# Patient Record
Sex: FEMALE | Race: WHITE | NOT HISPANIC OR LATINO | Employment: FULL TIME | URBAN - METROPOLITAN AREA
[De-identification: names, ages, dates, MRNs, and addresses within clinical notes are randomized per-mention and may not be internally consistent; named-entity substitution may affect disease eponyms.]

---

## 2019-01-15 ENCOUNTER — APPOINTMENT (OUTPATIENT)
Dept: RADIOLOGY | Facility: IMAGING CENTER | Age: 29
End: 2019-01-15
Attending: NURSE PRACTITIONER
Payer: COMMERCIAL

## 2019-01-15 ENCOUNTER — OFFICE VISIT (OUTPATIENT)
Dept: URGENT CARE | Facility: CLINIC | Age: 29
End: 2019-01-15
Payer: COMMERCIAL

## 2019-01-15 VITALS
DIASTOLIC BLOOD PRESSURE: 74 MMHG | TEMPERATURE: 99.4 F | RESPIRATION RATE: 16 BRPM | HEART RATE: 87 BPM | OXYGEN SATURATION: 98 % | SYSTOLIC BLOOD PRESSURE: 118 MMHG

## 2019-01-15 DIAGNOSIS — R05.9 COUGH: ICD-10-CM

## 2019-01-15 PROCEDURE — 71046 X-RAY EXAM CHEST 2 VIEWS: CPT | Mod: 26 | Performed by: NURSE PRACTITIONER

## 2019-01-15 PROCEDURE — 99214 OFFICE O/P EST MOD 30 MIN: CPT | Performed by: NURSE PRACTITIONER

## 2019-01-15 RX ORDER — BENZONATATE 100 MG/1
100 CAPSULE ORAL 3 TIMES DAILY PRN
Qty: 60 CAP | Refills: 0 | Status: SHIPPED | OUTPATIENT
Start: 2019-01-15 | End: 2021-06-23

## 2019-01-15 ASSESSMENT — ENCOUNTER SYMPTOMS
NAUSEA: 0
DIZZINESS: 0
EYE PAIN: 0
VOMITING: 0
MYALGIAS: 0
CHILLS: 0
WHEEZING: 0
SHORTNESS OF BREATH: 0
SORE THROAT: 0
COUGH: 1
RHINORRHEA: 0
FEVER: 0

## 2019-01-15 NOTE — PROGRESS NOTES
Subjective:   Cassie Powers is a 28 y.o. female who presents for Cough (x4-5 months, worse at night and when she's laying down)        Cough   This is a new problem. Episode onset: 4-5 months. The problem has been waxing and waning. The problem occurs every few hours. The cough is non-productive. Pertinent negatives include no chest pain, chills, ear pain, fever, myalgias, nasal congestion, postnasal drip, rash, rhinorrhea, sore throat, shortness of breath or wheezing. The symptoms are aggravated by lying down. She has tried OTC cough suppressant for the symptoms. The treatment provided no relief. There is no history of asthma or bronchitis.     Review of Systems   Constitutional: Negative for chills and fever.   HENT: Negative for ear pain, postnasal drip, rhinorrhea and sore throat.    Eyes: Negative for pain.   Respiratory: Positive for cough. Negative for shortness of breath and wheezing.    Cardiovascular: Negative for chest pain.   Gastrointestinal: Negative for nausea and vomiting.   Genitourinary: Negative for hematuria.   Musculoskeletal: Negative for myalgias.   Skin: Negative for rash.   Neurological: Negative for dizziness.     Allergies   Allergen Reactions   • Latex       Objective:   /74   Pulse 87   Temp 37.4 °C (99.4 °F)   Resp 16   SpO2 98%   Physical Exam   Constitutional: She is oriented to person, place, and time. She appears well-developed and well-nourished. No distress.   HENT:   Head: Normocephalic and atraumatic.   Right Ear: Tympanic membrane normal.   Left Ear: Tympanic membrane normal.   Nose: Nose normal. Right sinus exhibits no maxillary sinus tenderness and no frontal sinus tenderness. Left sinus exhibits no maxillary sinus tenderness and no frontal sinus tenderness.   Mouth/Throat: Uvula is midline, oropharynx is clear and moist and mucous membranes are normal. No posterior oropharyngeal edema, posterior oropharyngeal erythema or tonsillar abscesses. No tonsillar  exudate.   Eyes: Pupils are equal, round, and reactive to light. Conjunctivae and EOM are normal. Right eye exhibits no discharge. Left eye exhibits no discharge.   Cardiovascular: Normal rate and regular rhythm.    No murmur heard.  Pulmonary/Chest: Effort normal and breath sounds normal. No respiratory distress. She has no decreased breath sounds. She has no wheezes. She has no rhonchi. She has no rales.   Abdominal: Soft. She exhibits no distension. There is no tenderness.   Neurological: She is alert and oriented to person, place, and time. She has normal reflexes. No sensory deficit.   Skin: Skin is warm, dry and intact.   Psychiatric: She has a normal mood and affect.         Assessment/Plan:     1. Cough  DX-CHEST-2 VIEWS    benzonatate (TESSALON) 100 MG Cap     X-ray reading without cardiopulmonary findings by my read. Radiology pending.    Xray results  No acute cardiopulmonary process is identified.      Patient is a 28-year-old female presents clinic for complaints of a cough lasting 4-5 months.  X-ray negative.  Discussed differentials with patient.  Patient without fevers, nonproductive, no infectious etiology suspected.  Recommend patient start daily antihistamine.  Will trial Tessalon Perles for cough.  Differential diagnosis, natural history, supportive care, and indications for immediate follow-up discussed.

## 2020-02-14 ENCOUNTER — OFFICE VISIT (OUTPATIENT)
Dept: URGENT CARE | Facility: CLINIC | Age: 30
End: 2020-02-14
Payer: COMMERCIAL

## 2020-02-14 ENCOUNTER — APPOINTMENT (OUTPATIENT)
Dept: RADIOLOGY | Facility: IMAGING CENTER | Age: 30
End: 2020-02-14
Attending: FAMILY MEDICINE
Payer: COMMERCIAL

## 2020-02-14 VITALS
OXYGEN SATURATION: 97 % | TEMPERATURE: 97.9 F | BODY MASS INDEX: 31.14 KG/M2 | RESPIRATION RATE: 14 BRPM | WEIGHT: 198.4 LBS | DIASTOLIC BLOOD PRESSURE: 84 MMHG | HEIGHT: 67 IN | HEART RATE: 96 BPM | SYSTOLIC BLOOD PRESSURE: 126 MMHG

## 2020-02-14 DIAGNOSIS — R05.3 CHRONIC COUGH: ICD-10-CM

## 2020-02-14 PROCEDURE — 99214 OFFICE O/P EST MOD 30 MIN: CPT | Performed by: FAMILY MEDICINE

## 2020-02-14 PROCEDURE — 71046 X-RAY EXAM CHEST 2 VIEWS: CPT | Mod: TC | Performed by: FAMILY MEDICINE

## 2020-02-14 RX ORDER — FLUTICASONE PROPIONATE 220 UG/1
1 AEROSOL, METERED RESPIRATORY (INHALATION) 2 TIMES DAILY
Qty: 1 INHALER | Refills: 0 | Status: SHIPPED | OUTPATIENT
Start: 2020-02-14 | End: 2021-06-23

## 2020-02-14 RX ORDER — BENZONATATE 200 MG/1
200 CAPSULE ORAL 3 TIMES DAILY PRN
Qty: 45 CAP | Refills: 0 | Status: SHIPPED | OUTPATIENT
Start: 2020-02-14 | End: 2021-06-23

## 2020-02-14 RX ORDER — AZITHROMYCIN 250 MG/1
TABLET, FILM COATED ORAL
Qty: 6 TAB | Refills: 0 | Status: SHIPPED | OUTPATIENT
Start: 2020-02-14 | End: 2021-06-23

## 2020-02-14 NOTE — PROGRESS NOTES
CC:  cough        Cough  This is a new problem. The current episode started several months ago. The problem has been unchanged.    She has a dry, constant, hacking cough, worse the more she speaks.   Denies any fever, chest pain or dyspnea.        She denies  : fatigue, muscle aches, fever. Pertinent negatives include no   headaches, nausea, vomiting, diarrhea, sweats, weight loss or wheezing.   She was originally treated for sinusitis with Augmentin 5 weeks ago.   she states that her sinus sx have resolved, just has a persistent dry cough.                  Past medical history was unremarkable and not pertinent to current issue      Social History     Tobacco Use   • Smoking status: Never Smoker   • Smokeless tobacco: Never Used   Substance Use Topics   • Alcohol use: Not on file   • Drug use: Not on file         Current Outpatient Medications on File Prior to Visit   Medication Sig Dispense Refill   • benzonatate (TESSALON) 100 MG Cap Take 1 Cap by mouth 3 times a day as needed for Cough. 60 Cap 0   • hydrocodone-acetaminophen (NORCO) 5-325 MG Tab per tablet Take 1-2 Tabs by mouth every 6 hours as needed. 20 Tab 0   • phenazopyridine (PYRIDIUM) 200 MG Tab Take 1 Tab by mouth 3 times a day as needed. 6 Tab 0   • ondansetron (ZOFRAN) 8 MG Tab Take 1 Tab by mouth every 8 hours as needed for Nausea/Vomiting. 15 Tab 0   • drospirenone-ethinyl estradiol (MICHELLE) 3-0.02 MG per tablet Take 1 Tab by mouth every day.       No current facility-administered medications on file prior to visit.                    Review of Systems   Constitutional: Negative for fever, chills and malaise/fatigue.   Eyes: Negative for vision changes, d/c.    Respiratory: + for cough .  Denies sputum production.    Cardiovascular: Negative for chest pain and palpitations.   Gastrointestinal: Negative for nausea, vomiting, abdominal pain, diarrhea and constipation.   Genitourinary: Negative for dysuria, urgency and frequency.   Skin: Negative for rash  "or  itching.   Neurological: Negative for dizziness and tingling.    Psychiatric/Behavioral: Negative for depression.   Hematologic/lymphatic - denies bruising or excessive bleeding  All other systems reviewed and are negative.           Objective:     /84   Pulse 96   Temp 36.6 °C (97.9 °F) (Temporal)   Resp 14   Ht 1.702 m (5' 7\")   Wt 90 kg (198 lb 6.4 oz)   SpO2 97%     Physical Exam   Constitutional: patient is oriented to person, place, and time. Patient appears well-developed and well-nourished. No distress.   HENT:   Head: Normocephalic and atraumatic.   Right Ear: External ear normal.   Left Ear: External ear normal.   Nose: mucosa normal.  No d/c.  Right sinus exhibits no maxillary sinus tenderness. Left sinus exhibits no maxillary sinus tenderness.   Mouth/Throat: Mucous membranes are normal. No oral lesions.  No posterior pharyngeal erythema.  No oropharyngeal exudate or posterior oropharyngeal edema.   Eyes: Conjunctivae and EOM are normal. Pupils are equal, round, and reactive to light. Right eye exhibits no discharge. Left eye exhibits no discharge. No scleral icterus.   Neck: Normal range of motion. Neck supple. No tracheal deviation present.   Cardiovascular: Normal rate, regular rhythm and normal heart sounds.  Exam reveals no friction rub.    Pulmonary/Chest: Effort normal. No respiratory distress. Patient has no wheezes or rhonchi. Patient has no rales.    Musculoskeletal:  exhibits no edema.   Lymphadenopathy:     Patient has no cervical adenopathy.      Neurological: patient is alert and oriented to person, place, and time.   Skin: Skin is warm and dry. No rash noted. No erythema.   Psychiatric: patient  has a normal mood and affect.  behavior is normal.   Nursing note and vitals reviewed.         Details     Reading Physician Reading Date Result Priority   Guerrero Galaviz M.D.  [phone] [facility]      Narrative & Impression        2/14/2020 1:50 " PM     HISTORY/REASON FOR EXAM:  COUGH; Cough  Chronic cough     TECHNIQUE/EXAM DESCRIPTION AND NUMBER OF VIEWS:  Two views of the chest.     COMPARISON:  1/15/2019.     FINDINGS:     No pulmonary infiltrates or consolidations are noted.  No pleural effusions, no pneumothorax are appreciated.  Normal cardiopericardial silhouette.        IMPRESSION:        1. No active cardiopulmonary abnormalities are identified.             Assessment/Plan:       1. Chronic cough  Chest x-ray was personally interpreted and reviewed. No acute cardiopulmonary findings. No pulmonary infiltrates or densities. Cardiac silhouette is normal. No hemidiaphragm elevation. No bony abnormalities.    Sx suspicious for pertussis.     Pt refused nasal swab  Will tx empirically with azithromycin      - azithromycin (ZITHROMAX) 250 MG Tab; Take as directed  Dispense: 6 Tab; Refill: 0  - fluticasone (FLOVENT HFA) 220 MCG/ACT Aerosol; Inhale 1 Puff by mouth 2 times a day.  Dispense: 1 Inhaler; Refill: 0  - benzonatate (TESSALON) 200 MG capsule; Take 1 Cap by mouth 3 times a day as needed for Cough.  Dispense: 45 Cap; Refill: 0    Follow up in one week if no improvement

## 2021-06-23 ENCOUNTER — APPOINTMENT (OUTPATIENT)
Dept: RADIOLOGY | Facility: MEDICAL CENTER | Age: 31
End: 2021-06-23
Attending: EMERGENCY MEDICINE
Payer: COMMERCIAL

## 2021-06-23 ENCOUNTER — OFFICE VISIT (OUTPATIENT)
Dept: URGENT CARE | Facility: CLINIC | Age: 31
End: 2021-06-23
Payer: COMMERCIAL

## 2021-06-23 ENCOUNTER — HOSPITAL ENCOUNTER (EMERGENCY)
Facility: MEDICAL CENTER | Age: 31
End: 2021-06-23
Attending: EMERGENCY MEDICINE
Payer: COMMERCIAL

## 2021-06-23 ENCOUNTER — PATIENT OUTREACH (OUTPATIENT)
Dept: HEALTH INFORMATION MANAGEMENT | Facility: OTHER | Age: 31
End: 2021-06-23

## 2021-06-23 VITALS
HEIGHT: 67 IN | OXYGEN SATURATION: 98 % | BODY MASS INDEX: 29.03 KG/M2 | WEIGHT: 185 LBS | HEART RATE: 83 BPM | SYSTOLIC BLOOD PRESSURE: 104 MMHG | TEMPERATURE: 97.3 F | RESPIRATION RATE: 16 BRPM | DIASTOLIC BLOOD PRESSURE: 66 MMHG

## 2021-06-23 VITALS
DIASTOLIC BLOOD PRESSURE: 78 MMHG | HEART RATE: 86 BPM | RESPIRATION RATE: 18 BRPM | TEMPERATURE: 98.5 F | SYSTOLIC BLOOD PRESSURE: 128 MMHG | OXYGEN SATURATION: 97 % | BODY MASS INDEX: 32.94 KG/M2 | WEIGHT: 209.88 LBS | HEIGHT: 67 IN

## 2021-06-23 DIAGNOSIS — R10.31 RLQ ABDOMINAL PAIN: ICD-10-CM

## 2021-06-23 DIAGNOSIS — I82.890 DEEP VENOUS THROMBOSIS OF PELVIC VEIN: ICD-10-CM

## 2021-06-23 LAB
ALBUMIN SERPL BCP-MCNC: 4.4 G/DL (ref 3.2–4.9)
ALBUMIN/GLOB SERPL: 1.3 G/DL
ALP SERPL-CCNC: 124 U/L (ref 30–99)
ALT SERPL-CCNC: 10 U/L (ref 2–50)
ANION GAP SERPL CALC-SCNC: 12 MMOL/L (ref 7–16)
APPEARANCE UR: CLEAR
AST SERPL-CCNC: 10 U/L (ref 12–45)
BASOPHILS # BLD AUTO: 0.4 % (ref 0–1.8)
BASOPHILS # BLD: 0.04 K/UL (ref 0–0.12)
BILIRUB SERPL-MCNC: 0.4 MG/DL (ref 0.1–1.5)
BILIRUB UR STRIP-MCNC: NORMAL MG/DL
BUN SERPL-MCNC: 11 MG/DL (ref 8–22)
CALCIUM SERPL-MCNC: 9.2 MG/DL (ref 8.4–10.2)
CHLORIDE SERPL-SCNC: 102 MMOL/L (ref 96–112)
CO2 SERPL-SCNC: 21 MMOL/L (ref 20–33)
COLOR UR AUTO: YELLOW
CREAT SERPL-MCNC: 0.71 MG/DL (ref 0.5–1.4)
EOSINOPHIL # BLD AUTO: 0.06 K/UL (ref 0–0.51)
EOSINOPHIL NFR BLD: 0.5 % (ref 0–6.9)
ERYTHROCYTE [DISTWIDTH] IN BLOOD BY AUTOMATED COUNT: 39.2 FL (ref 35.9–50)
GLOBULIN SER CALC-MCNC: 3.3 G/DL (ref 1.9–3.5)
GLUCOSE SERPL-MCNC: 80 MG/DL (ref 65–99)
GLUCOSE UR STRIP.AUTO-MCNC: NORMAL MG/DL
HCT VFR BLD AUTO: 40.8 % (ref 37–47)
HGB BLD-MCNC: 13.8 G/DL (ref 12–16)
IMM GRANULOCYTES # BLD AUTO: 0.05 K/UL (ref 0–0.11)
IMM GRANULOCYTES NFR BLD AUTO: 0.5 % (ref 0–0.9)
INT CON NEG: NORMAL
INT CON POS: NORMAL
KETONES UR STRIP.AUTO-MCNC: NORMAL MG/DL
LEUKOCYTE ESTERASE UR QL STRIP.AUTO: NORMAL
LIPASE SERPL-CCNC: 34 U/L (ref 7–58)
LYMPHOCYTES # BLD AUTO: 1.92 K/UL (ref 1–4.8)
LYMPHOCYTES NFR BLD: 17.3 % (ref 22–41)
MCH RBC QN AUTO: 29.1 PG (ref 27–33)
MCHC RBC AUTO-ENTMCNC: 33.8 G/DL (ref 33.6–35)
MCV RBC AUTO: 86.1 FL (ref 81.4–97.8)
MONOCYTES # BLD AUTO: 0.94 K/UL (ref 0–0.85)
MONOCYTES NFR BLD AUTO: 8.5 % (ref 0–13.4)
NEUTROPHILS # BLD AUTO: 8.1 K/UL (ref 2–7.15)
NEUTROPHILS NFR BLD: 72.8 % (ref 44–72)
NITRITE UR QL STRIP.AUTO: NORMAL
NRBC # BLD AUTO: 0 K/UL
NRBC BLD-RTO: 0 /100 WBC
PH UR STRIP.AUTO: 5.5 [PH] (ref 5–8)
PLATELET # BLD AUTO: 362 K/UL (ref 164–446)
PMV BLD AUTO: 8.5 FL (ref 9–12.9)
POC URINE PREGNANCY TEST: NEGATIVE
POTASSIUM SERPL-SCNC: 4 MMOL/L (ref 3.6–5.5)
PROT SERPL-MCNC: 7.7 G/DL (ref 6–8.2)
PROT UR QL STRIP: NORMAL MG/DL
RBC # BLD AUTO: 4.74 M/UL (ref 4.2–5.4)
RBC UR QL AUTO: NORMAL
SODIUM SERPL-SCNC: 135 MMOL/L (ref 135–145)
SP GR UR STRIP.AUTO: 1.03
UROBILINOGEN UR STRIP-MCNC: 0.2 MG/DL
WBC # BLD AUTO: 11.1 K/UL (ref 4.8–10.8)

## 2021-06-23 PROCEDURE — 83690 ASSAY OF LIPASE: CPT

## 2021-06-23 PROCEDURE — 96372 THER/PROPH/DIAG INJ SC/IM: CPT

## 2021-06-23 PROCEDURE — 99284 EMERGENCY DEPT VISIT MOD MDM: CPT

## 2021-06-23 PROCEDURE — 81025 URINE PREGNANCY TEST: CPT | Performed by: NURSE PRACTITIONER

## 2021-06-23 PROCEDURE — 85025 COMPLETE CBC W/AUTO DIFF WBC: CPT

## 2021-06-23 PROCEDURE — 80053 COMPREHEN METABOLIC PANEL: CPT

## 2021-06-23 PROCEDURE — 700117 HCHG RX CONTRAST REV CODE 255: Performed by: EMERGENCY MEDICINE

## 2021-06-23 PROCEDURE — U0003 INFECTIOUS AGENT DETECTION BY NUCLEIC ACID (DNA OR RNA); SEVERE ACUTE RESPIRATORY SYNDROME CORONAVIRUS 2 (SARS-COV-2) (CORONAVIRUS DISEASE [COVID-19]), AMPLIFIED PROBE TECHNIQUE, MAKING USE OF HIGH THROUGHPUT TECHNOLOGIES AS DESCRIBED BY CMS-2020-01-R: HCPCS

## 2021-06-23 PROCEDURE — 81002 URINALYSIS NONAUTO W/O SCOPE: CPT | Performed by: NURSE PRACTITIONER

## 2021-06-23 PROCEDURE — 74177 CT ABD & PELVIS W/CONTRAST: CPT

## 2021-06-23 PROCEDURE — 700111 HCHG RX REV CODE 636 W/ 250 OVERRIDE (IP): Performed by: EMERGENCY MEDICINE

## 2021-06-23 PROCEDURE — U0005 INFEC AGEN DETEC AMPLI PROBE: HCPCS

## 2021-06-23 PROCEDURE — 99214 OFFICE O/P EST MOD 30 MIN: CPT | Performed by: NURSE PRACTITIONER

## 2021-06-23 PROCEDURE — 76856 US EXAM PELVIC COMPLETE: CPT

## 2021-06-23 RX ORDER — DROSPIRENONE AND ETHINYL ESTRADIOL 0.03MG-3MG
1 KIT ORAL DAILY
COMMUNITY
End: 2021-06-23

## 2021-06-23 RX ORDER — IBUPROFEN 200 MG
400 TABLET ORAL EVERY 6 HOURS PRN
Status: SHIPPED | COMMUNITY
End: 2021-06-29

## 2021-06-23 RX ORDER — ACETAMINOPHEN 500 MG
1000 TABLET ORAL EVERY 6 HOURS PRN
COMMUNITY

## 2021-06-23 RX ORDER — NAPROXEN 500 MG/1
500 TABLET ORAL 2 TIMES DAILY WITH MEALS
Qty: 60 TABLET | Refills: 0 | Status: SHIPPED | OUTPATIENT
Start: 2021-06-23 | End: 2021-06-29

## 2021-06-23 RX ORDER — APIXABAN 5 MG (74)
10 KIT ORAL 2 TIMES DAILY
Qty: 60 EACH | Refills: 0 | Status: SHIPPED | OUTPATIENT
Start: 2021-06-23 | End: 2021-06-29

## 2021-06-23 RX ADMIN — ENOXAPARIN SODIUM 100 MG: 100 INJECTION SUBCUTANEOUS at 17:05

## 2021-06-23 RX ADMIN — IOHEXOL 100 ML: 350 INJECTION, SOLUTION INTRAVENOUS at 16:17

## 2021-06-23 ASSESSMENT — ENCOUNTER SYMPTOMS
WHEEZING: 0
DIZZINESS: 0
FLATUS: 0
CHILLS: 0
FEVER: 0
VOMITING: 0
ORTHOPNEA: 0
NAUSEA: 0
ABDOMINAL PAIN: 1
HEARTBURN: 0
DIARRHEA: 0
ANOREXIA: 0
COUGH: 0
MYALGIAS: 0
TINGLING: 0
BELCHING: 0
HEMATOCHEZIA: 0
BACK PAIN: 0
ARTHRALGIAS: 0
PALPITATIONS: 0
MEMORY LOSS: 0
SORE THROAT: 0
CONSTIPATION: 0
SHORTNESS OF BREATH: 0
HEADACHES: 0

## 2021-06-23 NOTE — ED PROVIDER NOTES
ED Provider Note    CHIEF COMPLAINT  Chief Complaint   Patient presents with   • Abdominal Pain   • Headache       HPI  Cassie Powers is a 31 y.o. female who presents complaining of right lower abdominal pain that she has had for the last 3 days.  She describes it as a sharp pain that first was intermittent and now is constant.  She states that this morning the pain was at its worst.  Nothing seems to make it better or worse.  She denies any dysuria frequency or urgency.  She denies pregnancy as she went to the urgent care prior to coming here and they tested her urine and was negative for pregnancy.  She denies any abnormal vaginal discharge or bleeding.  She denies any documented fevers however she has felt fevers and chills.  She has no appetite.  She still has her gallbladder and appendix.  The patient drinks occasionally.  She denies any drug use or tobacco use.  Currently her pain is about a 4/10.    REVIEW OF SYSTEMS  HEENT:  No ear pain, congestion or sore throat   EYES: no discharge redness or vision changes  CARDIAC: no chest pain, palpitations    PULMONARY: no dyspnea, cough or congestion   GI: no vomiting diarrhea positive abdominal pain  : no dysuria, back pain or hematuria   Neuro: no weakness, numbness aphasia or headache  Musculoskeletal: no swelling deformity or pain no joint swelling  Endocrine: no fevers, sweating, weight loss   SKIN: no rash, erythema or contusions     See history of present illness all other systems are negative      PAST MEDICAL HISTORY  Past Medical History:   Diagnosis Date   • Anxiety    • Scoliosis        FAMILY HISTORY  History reviewed. No pertinent family history.    SOCIAL HISTORY  Social History     Socioeconomic History   • Marital status: Single     Spouse name: Not on file   • Number of children: Not on file   • Years of education: Not on file   • Highest education level: Not on file   Occupational History   • Not on file   Tobacco Use   • Smoking status: Never  "Smoker   • Smokeless tobacco: Never Used   Vaping Use   • Vaping Use: Never used   Substance and Sexual Activity   • Alcohol use: Yes     Comment: occ    • Drug use: Never   • Sexual activity: Not on file   Other Topics Concern   • Not on file   Social History Narrative   • Not on file     Social Determinants of Health     Financial Resource Strain:    • Difficulty of Paying Living Expenses:    Food Insecurity:    • Worried About Running Out of Food in the Last Year:    • Ran Out of Food in the Last Year:    Transportation Needs:    • Lack of Transportation (Medical):    • Lack of Transportation (Non-Medical):    Physical Activity:    • Days of Exercise per Week:    • Minutes of Exercise per Session:    Stress:    • Feeling of Stress :    Social Connections:    • Frequency of Communication with Friends and Family:    • Frequency of Social Gatherings with Friends and Family:    • Attends [employer] Services:    • Active Member of Clubs or Organizations:    • Attends Club or Organization Meetings:    • Marital Status:    Intimate Partner Violence:    • Fear of Current or Ex-Partner:    • Emotionally Abused:    • Physically Abused:    • Sexually Abused:        SURGICAL HISTORY  No past surgical history on file.    CURRENT MEDICATIONS  Home Medications     Reviewed by Kwais Torres (Pharmacy Tech) on 06/23/21 at 1714  Med List Status: Complete   Medication Last Dose Status   acetaminophen (TYLENOL) 500 MG Tab 6/22/2021 Active   ibuprofen (MOTRIN) 200 MG Tab 6/23/2021 Active   Levonorgestrel (MIRENA, 52 MG, IU) > 3 years Active                 ALLERGIES  Allergies   Allergen Reactions   • Latex Hives and Itching       PHYSICAL EXAM  VITAL SIGNS: /78   Pulse 86   Temp 36.9 °C (98.5 °F) (Temporal)   Resp 18   Ht 1.702 m (5' 7\")   Wt 95.2 kg (209 lb 14.1 oz)   LMP 06/17/2021 (Approximate)   SpO2 97%   BMI 32.87 kg/m²       Constitutional: Well developed, Well nourished, No acute distress, Non-toxic " appearance.   HENT: Normocephalic, Atraumatic, Bilateral external ears normal, Oropharynx moist, No oral exudates, Nose normal.   Eyes: PERRLA, EOMI, Conjunctiva normal, No discharge.   Neck: Normal range of motion, No tenderness, Supple, No stridor.   Lymphatic: No lymphadenopathy noted.   Cardiovascular: Normal heart rate, Normal rhythm, No murmurs, No rubs, No gallops.   Thorax & Lungs: Normal breath sounds, No respiratory distress, No wheezing, No chest tenderness.   Abdomen: Positive right lower quadrant tenderness to palpation with mild rebound bowel sounds are decreased abdomen is nondistended  Skin: Warm, Dry, No erythema, No rash.   Back: No tenderness, No CVA tenderness.   Extremities: Intact distal pulses, No edema, No tenderness, No cyanosis, No clubbing.   Neurologic: Alert & oriented x 3, Normal motor function, Normal sensory function, No focal deficits noted.       RADIOLOGY/PROCEDURES  US-PELVIC COMPLETE (TRANSABDOMINAL/TRANSVAGINAL) (COMBO)   Final Result      1.  Complex right adnexal tubular structure of uncertain etiology, possibly dilated fallopian tube with irregular contents versus partially thrombosed vessels/vein.   2.  Right ovary contains multiple small follicles. Blood flow could not be obtained within the right ovary which is indeterminate and possibly technical versus ovarian torsion. The ovaries not particularly enlarged.   3.  The left ovary is not visualized.   4.  Gynecology consultation is suggested.      CT-ABDOMEN-PELVIS WITH   Final Result         1.  Right ovarian vein thrombophlebitis and thrombosis of right internal iliac vein. There are additional prominent dilated tubular structures in the pelvis on the right which probably represent dilated thrombosed veins with adjacent fat stranding    possibly related to edema. An underlying inflammatory process in the right adnexa cannot be excluded. Consider further characterization with pelvic ultrasound. Gynecology consultation is  suggested.   2.  Mild right-sided urinary bladder wall thickening which could be reactive however correlate with urinalysis.   3.  IUD is well positioned.   4.  Normal appendix.         These findings were discussed with ADAM BETANCOURT on 6/23/2021 4:38 PM.               COURSE & MEDICAL DECISION MAKING  Pertinent Labs & Imaging studies reviewed. (See chart for details)  Differential diagnosis: Appendicitis, colitis, ovarian cyst,    Results for orders placed or performed during the hospital encounter of 06/23/21   CBC WITH DIFFERENTIAL   Result Value Ref Range    WBC 11.1 (H) 4.8 - 10.8 K/uL    RBC 4.74 4.20 - 5.40 M/uL    Hemoglobin 13.8 12.0 - 16.0 g/dL    Hematocrit 40.8 37.0 - 47.0 %    MCV 86.1 81.4 - 97.8 fL    MCH 29.1 27.0 - 33.0 pg    MCHC 33.8 33.6 - 35.0 g/dL    RDW 39.2 35.9 - 50.0 fL    Platelet Count 362 164 - 446 K/uL    MPV 8.5 (L) 9.0 - 12.9 fL    Neutrophils-Polys 72.80 (H) 44.00 - 72.00 %    Lymphocytes 17.30 (L) 22.00 - 41.00 %    Monocytes 8.50 0.00 - 13.40 %    Eosinophils 0.50 0.00 - 6.90 %    Basophils 0.40 0.00 - 1.80 %    Immature Granulocytes 0.50 0.00 - 0.90 %    Nucleated RBC 0.00 /100 WBC    Neutrophils (Absolute) 8.10 (H) 2.00 - 7.15 K/uL    Lymphs (Absolute) 1.92 1.00 - 4.80 K/uL    Monos (Absolute) 0.94 (H) 0.00 - 0.85 K/uL    Eos (Absolute) 0.06 0.00 - 0.51 K/uL    Baso (Absolute) 0.04 0.00 - 0.12 K/uL    Immature Granulocytes (abs) 0.05 0.00 - 0.11 K/uL    NRBC (Absolute) 0.00 K/uL   COMP METABOLIC PANEL   Result Value Ref Range    Sodium 135 135 - 145 mmol/L    Potassium 4.0 3.6 - 5.5 mmol/L    Chloride 102 96 - 112 mmol/L    Co2 21 20 - 33 mmol/L    Anion Gap 12.0 7.0 - 16.0    Glucose 80 65 - 99 mg/dL    Bun 11 8 - 22 mg/dL    Creatinine 0.71 0.50 - 1.40 mg/dL    Calcium 9.2 8.4 - 10.2 mg/dL    AST(SGOT) 10 (L) 12 - 45 U/L    ALT(SGPT) 10 2 - 50 U/L    Alkaline Phosphatase 124 (H) 30 - 99 U/L    Total Bilirubin 0.4 0.1 - 1.5 mg/dL    Albumin 4.4 3.2 - 4.9 g/dL    Total Protein  7.7 6.0 - 8.2 g/dL    Globulin 3.3 1.9 - 3.5 g/dL    A-G Ratio 1.3 g/dL   LIPASE   Result Value Ref Range    Lipase 34 7 - 58 U/L   ESTIMATED GFR   Result Value Ref Range    GFR If African American >60 >60 mL/min/1.73 m 2    GFR If Non African American >60 >60 mL/min/1.73 m 2   SARS-CoV-2 PCR (24 hour In-House): Collect NP swab in VTM    Specimen: Nasopharyngeal; Respirate   Result Value Ref Range    SARS-CoV-2 Source NP Swab         5:22 PM spoke with Dr. Raphael who is on-call for GYN at [facility].  She states that there is nothing to do from a gynecological standpoint at this time.  The patient will be admitted here at [facility] for pain control, blood thinners and further evaluation.    6:21 PM spoke with vascular surgery Dr. Barrientos vascular surgery to just recommends anticoagulation and an outpatient hypercoagulable work-up with her primary care physician.    I spoke with the patient and her pain is under control and she wishes to be discharged home.  I did give her a dose of Lovenox here in the emergency department and I will prescribe her an Eliquis starter pack which she is to start in the morning first thing.  I also prescribe her Naprosyn for pain.  If she has worsening pain dizziness chest pain or shortness of breath she should return to the emergency department immediately.  The patient understands her diagnosis.  I also spoke with ED scheduling so they can schedule her with a primary care appointment.  Patient is to return for any worsening symptoms.    The patient will return for new or worsening symptoms and is stable at the time of discharge.    The patient is referred to a primary physician for blood pressure management, diabetic screening, and for all other preventative health concerns.      DISPOSITION:  Patient will be discharged home in stable condition.    FOLLOW UP:  No follow-up provider specified.    OUTPATIENT MEDICATIONS:  Discharge Medication List as of 6/23/2021  6:15 PM      START  taking these medications    Details   Apixaban Starter Pack (ELIQUIS DVT/PE STARTER PACK) 5 MG Tablet Therapy Pack Take 10 mg by mouth 2 times a day for 7 days. Then 5 mg BID, Disp-60 Each, R-0, Normal      naproxen (NAPROSYN) 500 MG Tab Take 1 tablet by mouth 2 times a day with meals., Disp-60 tablet, R-0, Normal               FINAL IMPRESSION  1. Deep venous thrombosis of pelvic vein  REFERRAL TO ANTICOAGULATION MONITORING    extending into IVC           Electronically signed by: Rukhsana Mortensen M.D., 6/23/2021 3:27 PM

## 2021-06-23 NOTE — ED TRIAGE NOTES
"Pt seen at [facility] earlier today.    Pt has been having RLQ pain that is \"stabbing\" and so painful that it stopped her in her tracks when walking. Pt states the pain is always there a little bit. Pt reports pain is worse with movement but can be improved with some positions.     Pt reports increasing s/s for past 10 days; has been treating with Advil and tylenol.     Pt provided urine sample at Urgent care.   "

## 2021-06-23 NOTE — PROGRESS NOTES
Subjective:      Cassie Powers is a 31 y.o. female who presents with RLQ pain x 1 week, more on the right side sharp pain )        Is a pleasant 31-year-old female who presents with 1 week of right lower quadrant pain.  Patient states she has had a sharp right pain of sudden onset while she was walking approximately 1 week ago.  She states she has been able to have bowel movements all week until today when she was having significant amount of pain and was unable to have a bowel movement.  She does work for [employer] [place] and did do a bedside ultrasound consult was told that her right ovary was unable to be visualized and she should get her appendix looked at.  Patient presented to urgent care.  States yesterday she was having sweats which is very abnormal for her.      RLQ Pain  This is a new problem. The current episode started in the past 7 days. The onset quality is sudden. The problem occurs constantly. The problem has been unchanged. The pain is located in the RLQ. The quality of the pain is aching and sharp. The abdominal pain does not radiate. Pertinent negatives include no anorexia, arthralgias, belching, constipation, diarrhea, dysuria, fever, flatus, headaches, hematochezia, hematuria, myalgias, nausea or vomiting. The pain is aggravated by certain positions and bowel movement. The pain is relieved by nothing. She has tried nothing for the symptoms.       Review of Systems   Constitutional: Negative for chills, fever and malaise/fatigue.   HENT: Negative for ear pain and sore throat.    Respiratory: Negative for cough, shortness of breath and wheezing.    Cardiovascular: Negative for chest pain, palpitations, orthopnea and leg swelling.   Gastrointestinal: Positive for abdominal pain. Negative for anorexia, constipation, diarrhea, flatus, heartburn, hematochezia, nausea and vomiting.   Genitourinary: Negative for dysuria and hematuria.   Musculoskeletal: Negative for arthralgias, back pain, joint pain  "and myalgias.   Skin: Negative for rash.   Neurological: Negative for dizziness, tingling and headaches.   Psychiatric/Behavioral: Negative for memory loss and suicidal ideas.   All other systems reviewed and are negative.         Objective:     /66   Pulse 83   Temp 36.3 °C (97.3 °F) (Temporal)   Resp 16   Ht 1.702 m (5' 7\")   Wt 83.9 kg (185 lb)   LMP 06/17/2021 (Approximate)   SpO2 98%   Breastfeeding No   BMI 28.98 kg/m²      Physical Exam  Vitals reviewed.   Constitutional:       General: She is not in acute distress.     Appearance: Normal appearance. She is not ill-appearing.   HENT:      Head: Normocephalic.      Right Ear: External ear normal.      Left Ear: External ear normal.      Nose: Nose normal. No congestion.      Mouth/Throat:      Mouth: Mucous membranes are moist.   Eyes:      Extraocular Movements: Extraocular movements intact.      Conjunctiva/sclera: Conjunctivae normal.   Cardiovascular:      Rate and Rhythm: Normal rate and regular rhythm.      Pulses: Normal pulses.      Heart sounds: Normal heart sounds. No murmur heard.     Pulmonary:      Effort: Pulmonary effort is normal.      Breath sounds: Normal breath sounds. No wheezing.   Abdominal:      General: Abdomen is flat. Bowel sounds are normal.      Palpations: Abdomen is soft. There is no mass.      Tenderness: There is abdominal tenderness in the right lower quadrant. There is rebound. There is no right CVA tenderness, left CVA tenderness or guarding. Positive signs include McBurney's sign. Negative signs include Moeller's sign.   Musculoskeletal:         General: Normal range of motion.      Cervical back: Normal range of motion.   Lymphadenopathy:      Cervical: No cervical adenopathy.   Skin:     General: Skin is warm and dry.      Capillary Refill: Capillary refill takes less than 2 seconds.   Neurological:      Mental Status: She is alert and oriented to person, place, and time.   Psychiatric:         Mood and " Affect: Mood normal.         Behavior: Behavior normal.            Lab Results/POC Test Results   Results for orders placed or performed in visit on 06/23/21   POCT Urinalysis   Result Value Ref Range    POC Color Yellow Negative    POC Appearance Clear Negative    POC Leukocyte Esterase Neg Negative    POC Nitrites Neg Negative    POC Urobiligen 0.2 Negative (0.2) mg/dL    POC Protein trace Negative mg/dL    POC Urine PH 5.5 5.0 - 8.0    POC Blood Trace-intact Negative    POC Specific Gravity 1.030 <1.005 - >1.030    POC Ketones Neg Negative mg/dL    POC Bilirubin Neg Negative mg/dL    POC Glucose Neg Negative mg/dL   POCT PREGNANCY   Result Value Ref Range    POC Urine Pregnancy Test Negative Negative    Internal Control Positive Valid     Internal Control Negative Valid                      Assessment/Plan:        1. RLQ abdominal pain  POCT Urinalysis    POCT PREGNANCY     At this time, I feel the patient requires a higher level of care including closer monitoring, stat lab work and/or imaging for further evaluation for complaints of RLQ pain.  This has been discussed with the patient and they state agreement and understanding.. The patient is in no acute distress upon clinic departure and will go directly to ED without delay.     My total time spent caring for the patient on the day of the encounter was at least 30 minutes.  Time includes record review, exam, communication with the patient, and documentation of this encounter. This does not include time spent on separately billable procedures/tests.    Please note that this dictation was created using voice recognition software. I have made every reasonable attempt to correct obvious errors, but I expect that there are errors of grammar and possibly content that I did not discover before finalizing the note.

## 2021-06-24 ENCOUNTER — ANTICOAGULATION MONITORING (OUTPATIENT)
Dept: VASCULAR LAB | Facility: MEDICAL CENTER | Age: 31
End: 2021-06-24

## 2021-06-24 LAB
SARS-COV-2 RNA RESP QL NAA+PROBE: NOTDETECTED
SPECIMEN SOURCE: NORMAL

## 2021-06-24 NOTE — PROGRESS NOTES
CHW called patient and scheduled a PCP appt on 06/29/21 at 2:30pm with Anita Domínguez. Patient informed over phone and via text of appointment. Patient has no other needs. Patient has contact info for CCM. CHW will not follow.

## 2021-06-24 NOTE — PROGRESS NOTES
ACUTE CARE VASCULAR SERVICE  PROGRESS NOTE      Notified of RLQ pain and right ovarian vein thrombus tracking to IVC    Recommend anticoagulation and outpatient follow-up for eventual hypercoagulable workup  If xarelto or eliquis is used for anticoagulation then patient could be discharged today provided pain is adequately controlled    Fantasma Barrientos MD  [facility] (General and Vascular Surgery)  Cell: [phone] (text/call)  Office: [phone]  __________________________________________________________________  Patient:Cassie Powers   MRN:[id number]   CSN:[id number]    6/23/2021    6:01 PM

## 2021-06-24 NOTE — PROGRESS NOTES
[facility]    Received anticoag referral from Dr. Rukhsana Mortensen on 6/23/21    Scheduled pt for initial visit to establish care on 6/29/21 @ 1:15 pm    Insurance:   PCP: [facility] (establishing care on 6/29 at )  Locations to be seen: [address]    [facility] at [phone], fax [phone]    Marilee SalamancaD

## 2021-06-24 NOTE — ED NOTES
Med rec updated and complete  Allergies reviewed  Interviewed pt with mother at bedside with permission from pt.  Pt reports no vitamin   Pt reports no antibiotics in the last 2 weeks       No current facility-administered medications on file prior to encounter.     Current Outpatient Medications on File Prior to Encounter   Medication Sig Dispense Refill   • acetaminophen (TYLENOL) 500 MG Tab Take 1,000 mg by mouth every 6 hours as needed for Moderate Pain.     • ibuprofen (MOTRIN) 200 MG Tab Take 400 mg by mouth every 6 hours as needed for Mild Pain.     • Levonorgestrel (MIRENA, 52 MG, IU) 1 Each by Intrauterine route see administration instructions. Every 5 years

## 2021-06-29 ENCOUNTER — OFFICE VISIT (OUTPATIENT)
Dept: MEDICAL GROUP | Facility: MEDICAL CENTER | Age: 31
End: 2021-06-29
Payer: COMMERCIAL

## 2021-06-29 ENCOUNTER — ANTICOAGULATION VISIT (OUTPATIENT)
Dept: VASCULAR LAB | Facility: MEDICAL CENTER | Age: 31
End: 2021-06-29
Attending: INTERNAL MEDICINE
Payer: COMMERCIAL

## 2021-06-29 ENCOUNTER — SUPERVISING PHYSICIAN REVIEW (OUTPATIENT)
Dept: VASCULAR LAB | Facility: MEDICAL CENTER | Age: 31
End: 2021-06-29

## 2021-06-29 VITALS
DIASTOLIC BLOOD PRESSURE: 76 MMHG | WEIGHT: 210.4 LBS | OXYGEN SATURATION: 96 % | BODY MASS INDEX: 33.02 KG/M2 | SYSTOLIC BLOOD PRESSURE: 120 MMHG | HEIGHT: 67 IN | TEMPERATURE: 98.1 F | HEART RATE: 84 BPM

## 2021-06-29 DIAGNOSIS — N83.8 RIGHT TUBO-OVARIAN MASS: ICD-10-CM

## 2021-06-29 DIAGNOSIS — I82.890 ACUTE DEEP VEIN THROMBOSIS OF PELVIC VEIN: ICD-10-CM

## 2021-06-29 DIAGNOSIS — Z91.89 AT RISK FOR INADEQUATE PAIN CONTROL: ICD-10-CM

## 2021-06-29 DIAGNOSIS — I82.90 ACUTE DEEP VEIN THROMBOSIS (DVT) OF NON-EXTREMITY VEIN: ICD-10-CM

## 2021-06-29 PROBLEM — I82.409 DEEP VEIN THROMBOSIS (HCC): Status: ACTIVE | Noted: 2021-06-29

## 2021-06-29 LAB
APPEARANCE UR: CLEAR
BILIRUB UR STRIP-MCNC: NORMAL MG/DL
COLOR UR AUTO: YELLOW
GLUCOSE UR STRIP.AUTO-MCNC: NORMAL MG/DL
KETONES UR STRIP.AUTO-MCNC: 0.2 MG/DL
LEUKOCYTE ESTERASE UR QL STRIP.AUTO: NORMAL
NITRITE UR QL STRIP.AUTO: NORMAL
PH UR STRIP.AUTO: NORMAL [PH] (ref 5–8)
PROT UR QL STRIP: 1.01 MG/DL
RBC UR QL AUTO: 5.5
SP GR UR STRIP.AUTO: NORMAL
UROBILINOGEN UR STRIP-MCNC: NORMAL MG/DL

## 2021-06-29 PROCEDURE — 81002 URINALYSIS NONAUTO W/O SCOPE: CPT | Performed by: PHYSICIAN ASSISTANT

## 2021-06-29 PROCEDURE — 99214 OFFICE O/P EST MOD 30 MIN: CPT | Performed by: PHYSICIAN ASSISTANT

## 2021-06-29 PROCEDURE — 99213 OFFICE O/P EST LOW 20 MIN: CPT

## 2021-06-29 PROCEDURE — 87491 CHLMYD TRACH DNA AMP PROBE: CPT

## 2021-06-29 PROCEDURE — 87591 N.GONORRHOEAE DNA AMP PROB: CPT

## 2021-06-29 ASSESSMENT — FIBROSIS 4 INDEX: FIB4 SCORE: 0.25

## 2021-06-29 ASSESSMENT — PATIENT HEALTH QUESTIONNAIRE - PHQ9: CLINICAL INTERPRETATION OF PHQ2 SCORE: 0

## 2021-06-29 NOTE — PROGRESS NOTES
NEW DOAC   .  Anticoagulation Summary  As of 6/29/2021    INR goal:     TTR:  --   INR used for dosing:  No new INR was available at the time of this encounter.   Warfarin maintenance plan:  No maintenance plan   Next INR check:  7/29/2021   Target end date:  9/29/2021    Indications    Acute deep vein thrombosis of pelvic vein [I82.890]             Anticoagulation Episode Summary     INR check location:      Preferred lab:      Send INR reminders to:      Comments:  Eliquis      Anticoagulation Care Providers     Provider Role Specialty Phone number    [facility] Responsible  [phone]        Anticoagulation Patient Findings      PCP: Anita Domínguez P.A.-C.  Cardiologist: none  Dx: Acute DVT of pelvic vein  CHADSVASC = n/a  HAS-BLED = 0  Target End Date = 9/29/21    Pt Hx: Pt was experiencing abdominal pain about 1.5 weeks ago and presented to the ER on 6/23. She was found to have DVT of the iliac vein.    Imaging was done during this ER visit:     US of the pelvis: Complex right adnexal tubular structure of uncertain etiology, possibly dilated fallopian tube with irregular contents versus partially thrombosed vessels/vein.    CT of the abdomen & pelvis: Right ovarian vein thrombophlebitis and thrombosis of right internal iliac vein. There are additional prominent dilated tubular structures in the pelvis on the right which probably represent dilated thrombosed veins with adjacent fat stranding     Labs:  Lab Results   Component Value Date/Time    WBC 11.1 (H) 06/23/2021 03:23 PM    RBC 4.74 06/23/2021 03:23 PM    HEMOGLOBIN 13.8 06/23/2021 03:23 PM    HEMATOCRIT 40.8 06/23/2021 03:23 PM    MCV 86.1 06/23/2021 03:23 PM    MCH 29.1 06/23/2021 03:23 PM    MCHC 33.8 06/23/2021 03:23 PM    MPV 8.5 (L) 06/23/2021 03:23 PM    NEUTSPOLYS 72.80 (H) 06/23/2021 03:23 PM    LYMPHOCYTES 17.30 (L) 06/23/2021 03:23 PM    MONOCYTES 8.50 06/23/2021 03:23 PM    EOSINOPHILS 0.50 06/23/2021 03:23 PM     BASOPHILS 0.40 06/23/2021 03:23 PM      Lab Results   Component Value Date/Time    SODIUM 135 06/23/2021 03:23 PM    POTASSIUM 4.0 06/23/2021 03:23 PM    CHLORIDE 102 06/23/2021 03:23 PM    CO2 21 06/23/2021 03:23 PM    GLUCOSE 80 06/23/2021 03:23 PM    BUN 11 06/23/2021 03:23 PM    CREATININE 0.71 06/23/2021 03:23 PM          Pt is new to Eliquis and new to [facility]. Discussed:   · Indication for DOAC therapy.  · Importance of monitoring and compliance.   · Monitoring parameters, signs and symptoms of bleeding or clotting.  · DOAC therapy, side effects, potential DDIs, OTC medications  · Hormonal therapy - pt has progesterone IUD; she will see her ob/gyn Dr. Hernandez  · Pregnancy - pt has an IUD; discussed fetal risk on Eliquis  · Lifestyle safety, ie smoking, ETOH, hobby safety, fall safety/prevention  · Procedures for missed doses or suspected missed doses, surgeries/procedures, travel, dental work, any medication changes     Start with Eliquis 10mg taken 2 times a day for 1 week and then decrease to 5mg taken 2 times daily thereafter.    DOAC affordable = yes    F/U - 4 weeks    Nancy Mckenzie, PharmD    Added [facility] to care team   Send to Bloch

## 2021-06-29 NOTE — ASSESSMENT & PLAN NOTE
Patient comes in today to establish care. Was seen at [facility] emergency room approximately 4 days ago for right lower quadrant pain. At that time patient had labs and imaging done. CT of the abdomen and pelvis showed right ovarian internal iliac vein fat stranding consistent with thrombophlebitis in addition right iliac vein thrombosis. There is also 2.2 cm nonspecific hypodense region seen on the right ovarian tubal region this was noted on ultrasound. ER doc spoke with gynecology as well as vascular medicine and patient sent home on Eliquis. She was also told to take NSAIDs. Patient did not take NSAIDs because she did not feel comfortable with this but still notes right lower quadrant pain. Denies burning with urination increased frequency urge. Does report pressure sensation in the bladder region. Is sexually active with one male partner. Uses IUD. No known history of STDs. Although she reports a lesion in her vaginal region today that is slightly sore. Denies vaginal discharge, foul odor irritation

## 2021-06-29 NOTE — PROGRESS NOTES
Subjective:   Cassie Powers is a 31 y.o. female here today for  Hospital follow up    Right tubo-ovarian mass  Patient comes in today to establish care. Was seen at [facility] emergency room approximately 4 days ago for right lower quadrant pain. At that time patient had labs and imaging done. CT of the abdomen and pelvis showed right ovarian internal iliac vein fat stranding consistent with thrombophlebitis in addition right iliac vein thrombosis. There is also 2.2 cm nonspecific hypodense region seen on the right ovarian tubal region this was noted on ultrasound. ER doc spoke with gynecology as well as vascular medicine and patient sent home on Eliquis. She was also told to take NSAIDs. Patient did not take NSAIDs because she did not feel comfortable with this but still notes right lower quadrant pain. Denies burning with urination increased frequency urge. Does report pressure sensation in the bladder region. Is sexually active with one male partner. Uses IUD. No known history of STDs. Although she reports a lesion in her vaginal region today that is slightly sore. Denies vaginal discharge, foul odor irritation         Current medicines (including changes today)  Current Outpatient Medications   Medication Sig Dispense Refill   • acetaminophen-codeine #3 (TYLENOL #3) 300-30 MG Tab Take 1 tablet by mouth every 6 hours as needed for Severe Pain for up to 7 days. 28 tablet 0   • apixaban (ELIQUIS) 5mg Tab Take 1 tablet by mouth 2 times a day. 60 tablet 2   • acetaminophen (TYLENOL) 500 MG Tab Take 1,000 mg by mouth every 6 hours as needed for Moderate Pain.     • Levonorgestrel (MIRENA, 52 MG, IU) 1 Each by Intrauterine route see administration instructions. Every 5 years       No current facility-administered medications for this visit.     She  has a past medical history of Anxiety and Scoliosis.  Latex     Social History and Family History were reviewed and updated.    ROS   No headaches, chest pain, no shortness  "of breath, abdominal pain, nausea, or vomiting.  All other systems were reviewed and are negative or noted as positive in the HPI.       Objective:     /76 (BP Location: Right arm, Patient Position: Sitting)   Pulse 84   Temp 36.7 °C (98.1 °F) (Temporal)   Ht 1.702 m (5' 7\")   Wt 95.4 kg (210 lb 6.4 oz)   SpO2 96%  Body mass index is 32.95 kg/m².     Physical Exam:  Constitutional: ANO x3, no acute distress, well-nourished, well-hydrated   Skin: Warm, dry, good turgor, no rashes in visible areas.  HEENT: Is normocephalic atraumatic, good PERRLA, extraocular movements intact, TMs and oropharynx are clear with good dentition   Neck: Soft and supple, trachea midline, no masses.  No thyroid megaly or cervical lymphadenopathy noted  Cardiovascular: Regular rate and rhythm.  Normal S1 and 2, no murmurs, rubs or gallops.  No edema noted  Lungs: Clear to auscultation bilaterally.  No wheezes, rales or rhonchi.  Good inspiratory and expiratory breath sounds  Abdomen: Soft, mildly tender in right lower quadrant.  No rebound or guarding , no masses.  No hepatosplenomegaly.  Negative Moeller's  Psych: Alert and oriented x3, normal affect and mood.  Neuro: Cranial nerves II through XII were assessed and intact.  Normal gait, normal cerebellar function noted  Musculoskeletal: Full range of motion, good strength against resistance    Clinical Course/Lab Analysis:    CT from the hospital on 6/23/2021 showed right ovarian internal iliac vein stranding may be thrombophlebitis.  2.2 cm nonspecific hypodense lesion seen in the right tubo-ovarian region.  Ultrasound of the right lower quadrant was negative for appendicitis but did show right iliac vein thrombosed labs reviewed with patient at length    Urinalysis done in clinic was unremarkable.  No inflammatory cells seen.  Urine pregnancy was negative.  Urine gonorrhea and Chlamydia are pending  Assessment and Plan:   The following treatment plan was discussed.  Signs and " symptoms for which to return were discussed with patient at length.  Patient verbalized understanding.    1. Right tubo-ovarian mass  - REFERRAL TO GYNECOLOGY  - REFERRAL TO VASCULAR MEDICINE  - POCT Urinalysis  - POCT urine pregnancy docked device  - CHLAMYDIA/GC PCR URINE OR SWAB; Future  - acetaminophen-codeine #3 (TYLENOL #3) 300-30 MG Tab; Take 1 tablet by mouth every 6 hours as needed for Severe Pain for up to 7 days.  Dispense: 28 tablet; Refill: 0    2. Acute deep vein thrombosis of pelvic vein  - REFERRAL TO GYNECOLOGY  - REFERRAL TO VASCULAR MEDICINE  - acetaminophen-codeine #3 (TYLENOL #3) 300-30 MG Tab; Take 1 tablet by mouth every 6 hours as needed for Severe Pain for up to 7 days.  Dispense: 28 tablet; Refill: 0    3. At risk for inadequate pain control  - PAIN MANAGEMENT SCRN, UR; Future  - Consent for Opiate Prescription  - acetaminophen-codeine #3 (TYLENOL #3) 300-30 MG Tab; Take 1 tablet by mouth every 6 hours as needed for Severe Pain for up to 7 days.  Dispense: 28 tablet; Refill: 0         Followup: 2-3 weeks    Please note that this dictation was created using voice recognition software. I have made every reasonable attempt to correct obvious errors, but I expect that there are errors of grammar and possibly content that I did not discover before finalizing the note.

## 2021-06-30 ENCOUNTER — DOCUMENTATION (OUTPATIENT)
Dept: VASCULAR LAB | Facility: MEDICAL CENTER | Age: 31
End: 2021-06-30

## 2021-06-30 ENCOUNTER — HOSPITAL ENCOUNTER (OUTPATIENT)
Facility: MEDICAL CENTER | Age: 31
End: 2021-06-30
Attending: PHYSICIAN ASSISTANT
Payer: COMMERCIAL

## 2021-06-30 DIAGNOSIS — N83.8 RIGHT TUBO-OVARIAN MASS: ICD-10-CM

## 2021-06-30 NOTE — PROGRESS NOTES
Per Dr. Bloch - schedule initial vasular medicine visit/LOT w/ Dr. Pompa.     3 mo and re-eval. Pls revise for tentative end-decision date 09/29.

## 2021-06-30 NOTE — PROGRESS NOTES
Initial anticoag note and most recent ED note and vascular surgery note reviewed.   Patient with ovarian vein thrombosis   On mirena iud  Started on elquis    Pending further recs, we will continue with 3 mo of oral anticoag and then reassess.     we will defer further w/u for gyn pathology or occult malignancy to pcp and gyn.     Will schedule vasc med visit at pcp request. May also probably benefit from heme referral in future.     Michael Bloch, MD  Anticoagulation Clinic    Cc:  MARCIA Domínguez

## 2021-07-01 LAB
C TRACH DNA SPEC QL NAA+PROBE: NEGATIVE
N GONORRHOEA DNA SPEC QL NAA+PROBE: NEGATIVE
SPECIMEN SOURCE: NORMAL

## 2021-07-08 DIAGNOSIS — Z00.00 HEALTHCARE MAINTENANCE: ICD-10-CM

## 2021-07-08 DIAGNOSIS — Z91.89 AT RISK FOR INADEQUATE PAIN CONTROL: ICD-10-CM

## 2021-07-29 ENCOUNTER — ANTICOAGULATION VISIT (OUTPATIENT)
Dept: MEDICAL GROUP | Facility: MEDICAL CENTER | Age: 31
End: 2021-07-29
Payer: COMMERCIAL

## 2021-07-29 VITALS — HEART RATE: 83 BPM | SYSTOLIC BLOOD PRESSURE: 112 MMHG | DIASTOLIC BLOOD PRESSURE: 60 MMHG

## 2021-07-29 DIAGNOSIS — I82.890 ACUTE DEEP VEIN THROMBOSIS OF PELVIC VEIN: ICD-10-CM

## 2021-07-29 PROCEDURE — 99211 OFF/OP EST MAY X REQ PHY/QHP: CPT | Performed by: INTERNAL MEDICINE

## 2021-07-29 RX ORDER — METRONIDAZOLE 500 MG/1
500 TABLET ORAL 2 TIMES DAILY
COMMUNITY
Start: 2021-07-29 | End: 2021-08-05

## 2021-07-29 RX ORDER — LEVOFLOXACIN 500 MG/1
500 TABLET, FILM COATED ORAL DAILY
COMMUNITY
Start: 2021-07-29 | End: 2021-07-29

## 2021-07-29 NOTE — PROGRESS NOTES
Target end date:TBD     Indication: illiac venous thrombosis     Drug: Eliquis 5 mg BID     Health Status Since Last Assessment   Pt is currently being treated for pelvic inflammatory disease with metronidazole and levaquin. She will take 1 dose of fluconazole to prevent yeast infection   Patient denies any embolic events (stroke/tia/systemic embolism)    Adherence with DOAC Therapy   Pt has 0 missed any doses in the average week    Bleeding Risk Assessment     Denies Epistaxis   Pt denies any excessive or unusual bleeding/hematomas.  Pt denies any GI bleeds or hematemesis.  Pt denies any concerning daily headache or sub dural hematoma symptoms.     Pt denies any hematuria or abnormal vaginal bleeding.   Latest Hemoglobin 13.8   ETOH overuse Denies      Creatinine Clearance/Renal Function     Latest ClCr >60    Hepatic function   Latest LFTs WNL   Pt denies any history of liver dysfunction      Drug Interactions   Platelets: 362   ASA/other antiplatelets none   NSAID none   Other drug interactions none    Verified no anticonvulsant or azole therapy, education provided for future use.     Examination   Blood Pressure WNL   Symptomatic hypotension none   Significant gait impairment/imbalance/high risk for falls? none    Final Assessment and Recommendations:   Patient appears stable from the anticoagulation standpoint.     Benefits of continued DOAC therapy outweigh risks for this patient   Recommend pt continue with current DOAC therapy    Other Actions: cmp/ cbc hemogram ordered prior to next visit    Follow up:   Will follow up with patient 2 weeks with vascular provider     Helen Betancur, MarileeD  .

## 2021-08-13 ENCOUNTER — OFFICE VISIT (OUTPATIENT)
Dept: VASCULAR LAB | Facility: MEDICAL CENTER | Age: 31
End: 2021-08-13
Attending: FAMILY MEDICINE
Payer: COMMERCIAL

## 2021-08-13 VITALS
WEIGHT: 211 LBS | SYSTOLIC BLOOD PRESSURE: 113 MMHG | HEART RATE: 82 BPM | BODY MASS INDEX: 33.12 KG/M2 | DIASTOLIC BLOOD PRESSURE: 78 MMHG | HEIGHT: 67 IN

## 2021-08-13 DIAGNOSIS — Z79.01 CHRONIC ANTICOAGULATION: ICD-10-CM

## 2021-08-13 DIAGNOSIS — I82.890 ACUTE DEEP VEIN THROMBOSIS (DVT) OF RIGHT SIDE OF PELVIC REGION: ICD-10-CM

## 2021-08-13 DIAGNOSIS — I82.90 ACUTE DEEP VEIN THROMBOSIS (DVT) OF NON-EXTREMITY VEIN: ICD-10-CM

## 2021-08-13 DIAGNOSIS — I82.890 ACUTE DEEP VEIN THROMBOSIS OF PELVIC VEIN: ICD-10-CM

## 2021-08-13 PROCEDURE — 99214 OFFICE O/P EST MOD 30 MIN: CPT | Performed by: FAMILY MEDICINE

## 2021-08-13 PROCEDURE — 99212 OFFICE O/P EST SF 10 MIN: CPT

## 2021-08-13 ASSESSMENT — ENCOUNTER SYMPTOMS
FOCAL WEAKNESS: 0
SEIZURES: 0
SHORTNESS OF BREATH: 0
CHILLS: 0
TREMORS: 0
HEADACHES: 0
WEAKNESS: 0
VOMITING: 0
HEMOPTYSIS: 0
DIARRHEA: 0
FEVER: 0
ABDOMINAL PAIN: 1
BRUISES/BLEEDS EASILY: 0
PALPITATIONS: 0
CLAUDICATION: 0
NAUSEA: 0
MYALGIAS: 0
COUGH: 0
DIZZINESS: 0
BLOOD IN STOOL: 0
WHEEZING: 0

## 2021-08-13 ASSESSMENT — FIBROSIS 4 INDEX: FIB4 SCORE: 0.25

## 2021-08-13 NOTE — PROGRESS NOTES
INITIAL VASCULAR ANTICOAGULATION VISIT  Subjective:   Cassie Powers is a 31 y.o. female who presents today 08/13/21 for   Chief Complaint   Patient presents with   • Office Visit     Initially referred by Anita Domínguez P.A.-C. for length of therapy (LOT) determination and management of anticoagulation in context of acute venothromboembolic disease    HPI:    VTE disease / Anticoagulation:   Current symptoms:  Bloating at night, no pain. No abnl VB or vaginal d/c  Current antithrombotic agent: eliquis 5mg BID   Complications: none, tolerating well, no bleeding or bruising   Date of initiation of anticoagulation: 6/23/21   Adherence: reports complete, no missed doses    _____Pertinent VTE pmhx:   Date of Diagnosis: 6/23/21   Type of Venous thromboembolic disease (VTE): acute R pelvic vein DVT   Preceding/presenting symptoms: R lower pelvic pain, acute.  Has Mirena IUD placed >1yr ago.    Antithrombotic therapy at time of VTE event: no  VTE tx course: Eliquis 10mg BID x 7d, now eliquis 5mg BID   Any personal VTE hx? No  Any family VTE hx? No  _____UNPROVOKED VS PROVOKED:   Recent surgery ? No  Recent trauma ? No  Smoker?  reports that she has never smoked. She has never used smokeless tobacco.    Extended travel? No  Other periods of immobility? No  Other known or potential risk factors for VTE disease:  no  WOMEN: Hx of cancer or abnormal cancer screenings: no       Any estrogen, testosterone HRT, E2 birth control, tamoxifene, raloxifene: Mirena IUD       Hx of recurrent miscarriages: no  Hypercoaguability work-up completed?  no (see assessment for details)    Past Medical History:   Diagnosis Date   • Anxiety    • Scoliosis      History reviewed. No pertinent surgical history.    Current Outpatient Medications:   •  apixaban, 5 mg, Oral, BID  •  acetaminophen, 1,000 mg, Oral, Q6HRS PRN, Taking  •  Levonorgestrel (MIRENA, 52 MG, IU), 1 Each, Intrauterine, See Admin Instructions, Taking   Allergies  "  Allergen Reactions   • Latex Hives and Itching   • Lavender Oil      Family History   Problem Relation Age of Onset   • Clotting Disorder Neg Hx      Social History     Tobacco Use   • Smoking status: Never Smoker   • Smokeless tobacco: Never Used   Vaping Use   • Vaping Use: Never used   Substance Use Topics   • Alcohol use: Yes     Comment: occ    • Drug use: Never       DIET AND EXERCISE:  Weight Change:stable   BMI Readings from Last 5 Encounters:   08/13/21 33.05 kg/m²   06/29/21 32.95 kg/m²   06/23/21 32.87 kg/m²   06/23/21 28.98 kg/m²   02/14/20 31.07 kg/m²     Diet: common adult  Exercise: minimal exercise     Review of Systems   Constitutional: Negative for chills, fever and malaise/fatigue.   HENT: Negative for nosebleeds.    Respiratory: Negative for cough, hemoptysis, shortness of breath and wheezing.    Cardiovascular: Negative for chest pain, palpitations, claudication and leg swelling.   Gastrointestinal: Positive for abdominal pain (bloating ). Negative for blood in stool, diarrhea, nausea and vomiting.   Musculoskeletal: Negative for joint pain and myalgias.   Skin: Negative for itching and rash.   Neurological: Negative for dizziness, tremors, focal weakness, seizures, weakness and headaches.   Endo/Heme/Allergies: Does not bruise/bleed easily.      Objective:     Vitals:    08/13/21 0856   BP: 113/78   BP Location: Left arm   Patient Position: Sitting   BP Cuff Size: Adult   Pulse: 82   Weight: 95.7 kg (211 lb)   Height: 1.702 m (5' 7\")      BP Readings from Last 5 Encounters:   08/13/21 113/78   07/29/21 112/60   06/29/21 120/76   06/23/21 128/78   06/23/21 104/66      Body mass index is 33.05 kg/m².  Physical Exam  Vitals reviewed.   Constitutional:       General: She is not in acute distress.     Appearance: Normal appearance.   HENT:      Head: Normocephalic and atraumatic.   Neck:      Thyroid: No thyroid mass.      Vascular: Normal carotid pulses.      Trachea: Trachea normal. "   Cardiovascular:      Rate and Rhythm: Normal rate and regular rhythm.      Chest Wall: PMI is not displaced.      Pulses: Normal pulses.           Carotid pulses are 2+ on the right side and 2+ on the left side.       Radial pulses are 2+ on the right side and 2+ on the left side.        Dorsalis pedis pulses are 2+ on the right side and 2+ on the left side.        Posterior tibial pulses are 2+ on the right side and 2+ on the left side.      Heart sounds: Normal heart sounds.   Pulmonary:      Effort: Pulmonary effort is normal.      Breath sounds: Normal breath sounds.   Musculoskeletal:      Cervical back: Full passive range of motion without pain.      Right lower leg: No edema.      Left lower leg: No edema.   Skin:     General: Skin is warm and dry.      Capillary Refill: Capillary refill takes less than 2 seconds.      Coloration: Skin is not cyanotic.      Nails: There is no clubbing.   Neurological:      General: No focal deficit present.      Mental Status: She is alert and oriented to person, place, and time. Mental status is at baseline.      Cranial Nerves: Cranial nerves are intact. No cranial nerve deficit.      Coordination: Coordination is intact. Coordination normal.      Gait: Gait is intact. Gait normal.   Psychiatric:         Mood and Affect: Mood normal.         Behavior: Behavior normal.                  Lab Results   Component Value Date    SODIUM 135 06/23/2021    POTASSIUM 4.0 06/23/2021    CHLORIDE 102 06/23/2021    CO2 21 06/23/2021    GLUCOSE 80 06/23/2021    BUN 11 06/23/2021    CREATININE 0.71 06/23/2021    IFAFRICA >60 06/23/2021    IFNOTAFR >60 06/23/2021        Lab Results   Component Value Date    WBC 11.1 (H) 06/23/2021    RBC 4.74 06/23/2021    HEMOGLOBIN 13.8 06/23/2021    HEMATOCRIT 40.8 06/23/2021    MCV 86.1 06/23/2021    MCH 29.1 06/23/2021    MCHC 33.8 06/23/2021    MPV 8.5 (L) 06/23/2021      VASCULAR IMAGING:     Last EKG:   No results found for this or any previous  visit.     CT abd/pelvis 6/23/21   1.  Right ovarian vein thrombophlebitis and thrombosis of right internal iliac vein. There are additional prominent dilated tubular structures in the pelvis on the right which probably represent dilated thrombosed veins with adjacent fat stranding   possibly related to edema. An underlying inflammatory process in the right adnexa cannot be excluded. Consider further characterization with pelvic ultrasound. Gynecology consultation is suggested.  2.  Mild right-sided urinary bladder wall thickening which could be reactive however correlate with urinalysis.  3.  IUD is well positioned.  4.  Normal appendix.    US pelvis 6/23/21   1.  Complex right adnexal tubular structure of uncertain etiology, possibly dilated fallopian tube with irregular contents versus partially thrombosed vessels/vein.  2.  Right ovary contains multiple small follicles. Blood flow could not be obtained within the right ovary which is indeterminate and possibly technical versus ovarian torsion. The ovaries not particularly enlarged.  3.  The left ovary is not visualized.  4.  Gynecology consultation is suggested.    Medical Decision Making:  Today's Assessment / Status / Plan:     1. Acute deep vein thrombosis (DVT) of right side of pelvic region  BETA -2 GLYCOPROTEIN I AB FERNANDO    ANTICARDIOLIPIN AB IGG,IGM,IGA    FACTOR II DNA ANALYSIS    FACTOR V LEIDEN MUTATION    US-PELVIC TRANSABDOMINAL ONLY   2. Chronic anticoagulation  US-PELVIC TRANSABDOMINAL ONLY   3. Acute deep vein thrombosis (DVT) of non-extremity vein  apixaban (ELIQUIS) 5mg Tab   4. Acute deep vein thrombosis of pelvic vein  apixaban (ELIQUIS) 5mg Tab     PATIENT TYPE: Primary Prevention    Etiology of Established CVD if Present:     1) R pelvic pain venous thrombosis, 6/2021 - see below     ANTITHROMBOTIC THERAPY:  Anti-Platelet/Anti-Coagulant Tx recommended: yes  Indication: acute R pelvic vein DVT, associated with PID per gyn   Date of initiation:  6/23/21   HAS-BLED bleeding risk calc ([email or web address]): 0 pts, 0.9%, low risk   Thrombophilia/hypercoag evaluation:  not recommended  Factors to consider for indefinite OAC: VTE at atypical site (mesenteric, portal, cerebral vv)  Last CBC, BMP: reviewed above   Expected duration: reviewed standard of care as per ACCP AT10 guidelines (2016) is 3-6 months minimum on full dose OAC.  After review of risks/benefits/alternatives, through shared decision-making, we will continue full dose OAC through next appt Oct 2021 (4mo therapy) to allow repeat pelvic US and gyn evaluation on 10/15/21  Antithrombotic therapy plan:  - repeat pelvic US - will order transabd as prior sonographer unable to locate or eval DVT with transvag approach -  AS NOTED IN THE ORDER COMMENTS OK TO COMPLETE TRANSVAGINAL IF NEEDED - REVIEWED WITH . IT IS IMPOSSIBLE TO PREDICT AT TIME OF ORDERING IF TRANSABD OR TRANSVAG APPROACH IS BEST OPTION AND THE PERFORMING SONOGRAPHER SHOULD HAVE LATITUDE TO COMPLETE EITHER APPROACH AS NEEDED AT THE TIME OF THE STUDY.    - recommend continuation of eliquis 5mg BID until late Oct 2021 and she will complete US pelvis and visit with gyn preceding next appt with our office   - defer ongoing PID and gyn specific treatment to gyn MD   - stressed strict adherence to tx and avoid early termination due to increased risk for recurrent VTE  - counseled on signs and symptoms of acute VTE that require seeking prompt attention in the ED to include shortness of breath, chest pain, pain with deep inhalation, acute leg swelling and/or pain in calf or leg, and worsening pelvic pain  - elevate legs as much as possible, use compression stockings/socks if directed by your provider  - Avoid hormonal therapies including estrogen or testosterone-containing meds, or raloxifene or tamoxifene (commonly used for osteoporosis)  - Avoid sedentary periods  - continue complete avoidance of tobacco products  - if having any invasive  "procedure,please make sure the doctor knows of your history of blood clots and current anticoagulation status  - avoid Aspirin and anti-inflammatories (eg. Advil, ibuprofen, Aleve, naproxen, etc) while anticoagulated   - avoid skiing or other dangerous activities to reduce risk of head injury and brain bleeds  - recommended to see your PCP to discuss if you need age-appropriate cancer screenings as a small % of blood clots may be caused by an underlying malignancy  - if any bleeding lasting 30min without stopping, please seek care with your PCP, urgent care, or ED  - reversal agents for most blood thinners are now available and used if you have major bleeding    LIPID MANAGEMENT:   Qualifies for Statin Therapy Based on 2018 ACC/AHA Guidelines: no  10-yr ASCVD risk score: <5% \"low risk\"  Major ASCVD events: None  High-risk conditions: None   Risk-enhancers: N/A  Currently on Statin: No  Treatment goals: LDL-C <100 (consider non-HDL-C <130, apoB <90)  At goal? N/A  Plan:   - reinforced ongoing TLC measures as noted   - defer lab surveillance to PCP   Meds: none at this time      BLOOD PRESSURE MANAGEMENT:  ACC/AHA (2017) goal <130/80  Home BP at goal: yes  Office BP at goal:  yes   Plan:   - continue healthy diet, activity, weight mgmt   Monitoring:   - routine clinic-based BP measurements at least once annually   Medications: no meds indicated at this time       GLYCEMIC STATUS:  Normal    LIFESTYLE RECOMMENDATIONS:     Smoking:  reports that she has never smoked. She has never used smokeless tobacco.   - continued complete avoidance of all tobacco products     Physical Activity: continue healthy activity to improve CV fitness, see care instructions for additional details     Weight Management and Nutrition: Dietary plan was discussed with patient at this visit including DASH, low sodium and/or as outlined in care instructions     OTHER:    # family planning  - avoidance of E2-containing modalities in the future  - " recommend use of IUD as ok, unless cause of PID and needs retrieval to reduce risk of recurrent PID and/or VTE events - defer to gyn   - delay any conception until after DOAC therapy completed    Instructed to follow-up with PCP for remainder of adult medical needs: yes  We will partner with other providers in the management of established vascular disease and cardiometabolic risk factors.    Studies to Be Obtained: US pelvic - early October    Labs to Be Obtained: as noted above     Follow up in: 2 months    Josh Pompa M.D.  [facility]   [facility]   [phone]

## 2021-08-13 NOTE — PATIENT INSTRUCTIONS
For more information on clots, review this website (copy/paste to web browser):   Cdc.gov/ncbddd/dvt/documents/understanding-blood-clots-gwwllpunphh-9923re-n.pdf    - counseled on signs and symptoms of acute VTE that require seeking prompt attention in the ED to include shortness of breath, chest pain, pain with deep inhalation, acute leg swelling and/or pain in calf or leg

## 2021-08-20 ENCOUNTER — APPOINTMENT (OUTPATIENT)
Dept: RADIOLOGY | Facility: MEDICAL CENTER | Age: 31
End: 2021-08-20
Attending: EMERGENCY MEDICINE
Payer: COMMERCIAL

## 2021-08-20 ENCOUNTER — HOSPITAL ENCOUNTER (EMERGENCY)
Facility: MEDICAL CENTER | Age: 31
End: 2021-08-20
Attending: EMERGENCY MEDICINE
Payer: COMMERCIAL

## 2021-08-20 VITALS
DIASTOLIC BLOOD PRESSURE: 69 MMHG | BODY MASS INDEX: 32.63 KG/M2 | HEIGHT: 67 IN | SYSTOLIC BLOOD PRESSURE: 109 MMHG | HEART RATE: 94 BPM | TEMPERATURE: 97.6 F | WEIGHT: 207.89 LBS | RESPIRATION RATE: 18 BRPM | OXYGEN SATURATION: 96 %

## 2021-08-20 DIAGNOSIS — U07.1 COVID-19: ICD-10-CM

## 2021-08-20 DIAGNOSIS — R11.2 NON-INTRACTABLE VOMITING WITH NAUSEA, UNSPECIFIED VOMITING TYPE: ICD-10-CM

## 2021-08-20 LAB
ANION GAP SERPL CALC-SCNC: 12 MMOL/L (ref 7–16)
BASOPHILS # BLD AUTO: 0 % (ref 0–1.8)
BASOPHILS # BLD: 0 K/UL (ref 0–0.12)
BUN SERPL-MCNC: 8 MG/DL (ref 8–22)
CALCIUM SERPL-MCNC: 9.1 MG/DL (ref 8.4–10.2)
CHLORIDE SERPL-SCNC: 101 MMOL/L (ref 96–112)
CO2 SERPL-SCNC: 22 MMOL/L (ref 20–33)
CREAT SERPL-MCNC: 0.68 MG/DL (ref 0.5–1.4)
EKG IMPRESSION: NORMAL
EOSINOPHIL # BLD AUTO: 0 K/UL (ref 0–0.51)
EOSINOPHIL NFR BLD: 0 % (ref 0–6.9)
ERYTHROCYTE [DISTWIDTH] IN BLOOD BY AUTOMATED COUNT: 40 FL (ref 35.9–50)
GLUCOSE SERPL-MCNC: 88 MG/DL (ref 65–99)
HCG SERPL QL: NEGATIVE
HCT VFR BLD AUTO: 44.9 % (ref 37–47)
HGB BLD-MCNC: 15.1 G/DL (ref 12–16)
LYMPHOCYTES # BLD AUTO: 0.86 K/UL (ref 1–4.8)
LYMPHOCYTES NFR BLD: 26 % (ref 22–41)
MANUAL DIFF BLD: NORMAL
MCH RBC QN AUTO: 28.9 PG (ref 27–33)
MCHC RBC AUTO-ENTMCNC: 33.6 G/DL (ref 33.6–35)
MCV RBC AUTO: 86 FL (ref 81.4–97.8)
MONOCYTES # BLD AUTO: 0.23 K/UL (ref 0–0.85)
MONOCYTES NFR BLD AUTO: 7 % (ref 0–13.4)
NEUTROPHILS # BLD AUTO: 2.21 K/UL (ref 2–7.15)
NEUTROPHILS NFR BLD: 66 % (ref 44–72)
NEUTS BAND NFR BLD MANUAL: 1 % (ref 0–10)
NRBC # BLD AUTO: 0 K/UL
NRBC BLD-RTO: 0 /100 WBC
PLATELET # BLD AUTO: 207 K/UL (ref 164–446)
PLATELET BLD QL SMEAR: NORMAL
PMV BLD AUTO: 8.7 FL (ref 9–12.9)
POTASSIUM SERPL-SCNC: 3.8 MMOL/L (ref 3.6–5.5)
RBC # BLD AUTO: 5.22 M/UL (ref 4.2–5.4)
RBC BLD AUTO: NORMAL
SODIUM SERPL-SCNC: 135 MMOL/L (ref 135–145)
VARIANT LYMPHS BLD QL SMEAR: NORMAL
WBC # BLD AUTO: 3.3 K/UL (ref 4.8–10.8)

## 2021-08-20 PROCEDURE — 700102 HCHG RX REV CODE 250 W/ 637 OVERRIDE(OP): Performed by: EMERGENCY MEDICINE

## 2021-08-20 PROCEDURE — 700111 HCHG RX REV CODE 636 W/ 250 OVERRIDE (IP): Performed by: EMERGENCY MEDICINE

## 2021-08-20 PROCEDURE — 85027 COMPLETE CBC AUTOMATED: CPT

## 2021-08-20 PROCEDURE — 80048 BASIC METABOLIC PNL TOTAL CA: CPT

## 2021-08-20 PROCEDURE — 71045 X-RAY EXAM CHEST 1 VIEW: CPT

## 2021-08-20 PROCEDURE — 93005 ELECTROCARDIOGRAM TRACING: CPT | Performed by: EMERGENCY MEDICINE

## 2021-08-20 PROCEDURE — 99284 EMERGENCY DEPT VISIT MOD MDM: CPT

## 2021-08-20 PROCEDURE — A9270 NON-COVERED ITEM OR SERVICE: HCPCS | Performed by: EMERGENCY MEDICINE

## 2021-08-20 PROCEDURE — 85007 BL SMEAR W/DIFF WBC COUNT: CPT

## 2021-08-20 PROCEDURE — 700105 HCHG RX REV CODE 258: Performed by: EMERGENCY MEDICINE

## 2021-08-20 PROCEDURE — 96374 THER/PROPH/DIAG INJ IV PUSH: CPT

## 2021-08-20 PROCEDURE — 84703 CHORIONIC GONADOTROPIN ASSAY: CPT

## 2021-08-20 RX ORDER — ONDANSETRON 4 MG/1
4 TABLET, ORALLY DISINTEGRATING ORAL EVERY 6 HOURS PRN
Qty: 10 TABLET | Refills: 0 | Status: SHIPPED | OUTPATIENT
Start: 2021-08-20 | End: 2022-10-27

## 2021-08-20 RX ORDER — ACETAMINOPHEN 325 MG/1
650 TABLET ORAL ONCE
Status: COMPLETED | OUTPATIENT
Start: 2021-08-20 | End: 2021-08-20

## 2021-08-20 RX ORDER — SODIUM CHLORIDE 9 MG/ML
1000 INJECTION, SOLUTION INTRAVENOUS ONCE
Status: COMPLETED | OUTPATIENT
Start: 2021-08-20 | End: 2021-08-20

## 2021-08-20 RX ORDER — ONDANSETRON 2 MG/ML
4 INJECTION INTRAMUSCULAR; INTRAVENOUS ONCE
Status: COMPLETED | OUTPATIENT
Start: 2021-08-20 | End: 2021-08-20

## 2021-08-20 RX ADMIN — ACETAMINOPHEN 650 MG: 325 TABLET, FILM COATED ORAL at 20:46

## 2021-08-20 RX ADMIN — ONDANSETRON 4 MG: 2 INJECTION INTRAMUSCULAR; INTRAVENOUS at 20:52

## 2021-08-20 RX ADMIN — SODIUM CHLORIDE 1000 ML: 9 INJECTION, SOLUTION INTRAVENOUS at 20:51

## 2021-08-20 ASSESSMENT — FIBROSIS 4 INDEX: FIB4 SCORE: 0.25

## 2021-08-21 NOTE — ED TRIAGE NOTES
"31 yr old female to triage  Chief Complaint   Patient presents with   • Vomiting     Patient report she had a home test and at work COVID test came back positive on Monday.  Patient report of vomiting, difficulty breathing, cough, headache, fever on Monday and nauseated on Tuesday.  Taking OTC tylenol, zinc, Vitamin C    • Shortness of Breath   • Cough   • Headache   • Nausea   • Fever     /79   Pulse (!) 109   Temp 36.9 °C (98.5 °F) (Temporal)   Resp 18   Ht 1.702 m (5' 7\")   Wt 94.3 kg (207 lb 14.3 oz)   LMP 08/15/2021 (LMP Unknown)   SpO2 96%   BMI 32.56 kg/m²     "

## 2021-08-21 NOTE — ED NOTES
Rn agrees with triage note. Pt with positive home COVID. Alert, oriented, verbal, c/o nausea   normal...

## 2021-08-21 NOTE — ED PROVIDER NOTES
ED Provider Note    CHIEF COMPLAINT  Chief Complaint   Patient presents with   • Vomiting     Patient report she had a home test and at work COVID test came back positive on Monday.  Patient report of vomiting, difficulty breathing, cough, headache, fever on Monday and nauseated on Tuesday.  Taking OTC tylenol, zinc, Vitamin C    • Shortness of Breath   • Cough   • Headache   • Nausea   • Fever       [facility]  Cassie Powers is a 31 y.o. female who presents to the emergency department complaining of nausea, vomiting, PO known COVID. Works for local intolerance, shortness of breath with dry cough, headache, muscle ache and low-grade fevers. Medical office. Using additional Tylenol, zinc and vitamin C with no relief. Now coming in due to difficulty with PO intolerance. No blood in vomitus or stool. No urinary symptoms.     REVIEW OF SYSTEMS  See HPI for further details. All other systems are negative.     PAST MEDICAL HISTORY   has a past medical history of Anxiety, Obstetric blood-clot embolism, and Scoliosis.    SOCIAL HISTORY  Social History     Tobacco Use   • Smoking status: Never Smoker   • Smokeless tobacco: Never Used   Vaping Use   • Vaping Use: Never used   Substance and Sexual Activity   • Alcohol use: Yes     Comment: occ    • Drug use: Never   • Sexual activity: Not on file       SURGICAL HISTORY  patient denies any surgical history    CURRENT MEDICATIONS  Home Medications     Reviewed by Katia Adams R.N. (Registered Nurse) on 08/20/21 at 1746  Med List Status: Complete   Medication Last Dose Status   acetaminophen (TYLENOL) 500 MG Tab 8/19/2021 Active   acetaminophen-codeine #3 (TYLENOL #3) 300-30 MG Tab 8/18/2021 Active   apixaban (ELIQUIS) 5mg Tab 8/20/2021 Active   Levonorgestrel (MIRENA, 52 MG, IU) 8/20/2021 Active                ALLERGIES  Allergies   Allergen Reactions   • Latex Hives and Itching   • Lavender Oil        PHYSICAL EXAM  VITAL SIGNS: /69   Pulse 94   Temp 36.4 °C  "(97.6 °F) (Temporal)   Resp 18   Ht 1.702 m (5' 7\")   Wt 94.3 kg (207 lb 14.3 oz)   LMP 08/15/2021 (LMP Unknown)   SpO2 96%   BMI 32.56 kg/m²  @DOMINIQUE[[id number]::@   Pulse ox interpretation: I interpret this pulse ox as normal.  Constitutional: Alert in no apparent distress. Appears fatigued  HENT: No signs of trauma, Bilateral external ears normal, Nose normal.   Eyes: Pupils are equal and reactive  Neck: Normal range of motion, No tenderness, Supple  Cardiovascular: Regular rate and rhythm, no murmurs.   Thorax & Lungs: Normal breath sounds, No respiratory distress, No wheezing, No chest tenderness.   Abdomen: Bowel sounds normal, Soft, No tenderness  Skin: Warm, Dry, No erythema, No rash.   Back: No bony tenderness, No CVA tenderness.   Extremities: Intact distal pulses, No edema  Musculoskeletal: Good range of motion in all major joints. No tenderness to palpation or major deformities noted.   Neurologic: Alert , Normal motor function, Normal sensory function, No focal deficits noted.   Psychiatric: Affect normal, Judgment normal, Mood normal.       DIAGNOSTIC STUDIES / PROCEDURES      LABS  Results for orders placed or performed during the hospital encounter of 08/20/21   CBC WITH DIFFERENTIAL   Result Value Ref Range    WBC 3.3 (L) 4.8 - 10.8 K/uL    RBC 5.22 4.20 - 5.40 M/uL    Hemoglobin 15.1 12.0 - 16.0 g/dL    Hematocrit 44.9 37.0 - 47.0 %    MCV 86.0 81.4 - 97.8 fL    MCH 28.9 27.0 - 33.0 pg    MCHC 33.6 33.6 - 35.0 g/dL    RDW 40.0 35.9 - 50.0 fL    Platelet Count 207 164 - 446 K/uL    MPV 8.7 (L) 9.0 - 12.9 fL    Neutrophils-Polys 66.00 44.00 - 72.00 %    Lymphocytes 26.00 22.00 - 41.00 %    Monocytes 7.00 0.00 - 13.40 %    Eosinophils 0.00 0.00 - 6.90 %    Basophils 0.00 0.00 - 1.80 %    Nucleated RBC 0.00 /100 WBC    Neutrophils (Absolute) 2.21 2.00 - 7.15 K/uL    Lymphs (Absolute) 0.86 (L) 1.00 - 4.80 K/uL    Monos (Absolute) 0.23 0.00 - 0.85 K/uL    Eos (Absolute) 0.00 0.00 - 0.51 K/uL    Baso " (Absolute) 0.00 0.00 - 0.12 K/uL    NRBC (Absolute) 0.00 K/uL   BASIC METABOLIC PANEL   Result Value Ref Range    Sodium 135 135 - 145 mmol/L    Potassium 3.8 3.6 - 5.5 mmol/L    Chloride 101 96 - 112 mmol/L    Co2 22 20 - 33 mmol/L    Glucose 88 65 - 99 mg/dL    Bun 8 8 - 22 mg/dL    Creatinine 0.68 0.50 - 1.40 mg/dL    Calcium 9.1 8.4 - 10.2 mg/dL    Anion Gap 12.0 7.0 - 16.0   HCG QUAL SERUM   Result Value Ref Range    Beta-Hcg Qualitative Serum Negative Negative   ESTIMATED GFR   Result Value Ref Range    GFR If African American >60 >60 mL/min/1.73 m 2    GFR If Non African American >60 >60 mL/min/1.73 m 2   PLATELET ESTIMATE   Result Value Ref Range    Plt Estimation Normal    MORPHOLOGY   Result Value Ref Range    RBC Morphology Normal     Reactive Lymphocytes Few    DIFFERENTIAL MANUAL   Result Value Ref Range    Bands-Stabs 1.00 0.00 - 10.00 %    Manual Diff Status PERFORMED    EKG   Result Value Ref Range    Report       [facility]    Test Date:  2021  Pt Name:    CHRIS ALEXANDER             Department: EDS  MRN:        [id number]                      Room:  Gender:     Female                       Technician: CARLOS A  :        1990                   Requested By:ER TRIAGE PROTOCOL  Order #:    [id number]                    Reading MD:    Measurements  Intervals                                Axis  Rate:       101                          P:          26  CA:         118                          QRS:        23  QRSD:       92                           T:          51  QT:         340  QTc:        441    Interpretive Statements  Sinus tachycardia  No previous ECG available for comparison           RADIOLOGY  DX-CHEST-PORTABLE (1 VIEW)   Final Result      No acute cardiopulmonary process is seen.              COURSE & MEDICAL DECISION MAKING  Pertinent Labs & Imaging studies reviewed. (See chart for details)    31-year-old female with known COVID presenting with  primary complaint of nausea, vomiting concern for dehydration. History as above. Patient now tolerating PO fluids including no better after IV fluids. Workup is otherwise benign. At this point I do believe the patient can clinically be discharged home and she will continue on Zofran. She is understanding return precautions and outpatient follow-up with primary care physician.  The patient will return for worsening symptoms and is stable at the time of discharge. The patient verbalizes understanding and will comply.    FINAL IMPRESSION  1. COVID-19    2. Non-intractable vomiting with nausea, unspecified vomiting type            Electronically signed by: Kevin Mcpherson M.D., 8/20/2021 9:01 PM

## 2021-08-21 NOTE — ED NOTES
Pt provided paper copy of AVS. Rn and patient went through discharge instructions. Pt offered follow up call. Pt verbalized understanding of discharge teachings, agrees with discharge plan.    Pt dressed appropriately for the weather.    RX: eRX at bedside    Pt ambulatory with steady gait at discharge.

## 2021-09-29 ENCOUNTER — HOSPITAL ENCOUNTER (OUTPATIENT)
Dept: LAB | Facility: MEDICAL CENTER | Age: 31
End: 2021-09-29
Attending: FAMILY MEDICINE
Payer: COMMERCIAL

## 2021-09-29 ENCOUNTER — HOSPITAL ENCOUNTER (OUTPATIENT)
Dept: RADIOLOGY | Facility: MEDICAL CENTER | Age: 31
End: 2021-09-29
Attending: FAMILY MEDICINE
Payer: COMMERCIAL

## 2021-09-29 DIAGNOSIS — I82.890 ACUTE DEEP VEIN THROMBOSIS (DVT) OF RIGHT SIDE OF PELVIC REGION: ICD-10-CM

## 2021-09-29 DIAGNOSIS — Z79.01 CHRONIC ANTICOAGULATION: ICD-10-CM

## 2021-09-29 PROCEDURE — 86147 CARDIOLIPIN ANTIBODY EA IG: CPT

## 2021-09-29 PROCEDURE — 36415 COLL VENOUS BLD VENIPUNCTURE: CPT

## 2021-09-29 PROCEDURE — 86146 BETA-2 GLYCOPROTEIN ANTIBODY: CPT | Mod: 91

## 2021-09-29 PROCEDURE — 81240 F2 GENE: CPT

## 2021-09-29 PROCEDURE — 76830 TRANSVAGINAL US NON-OB: CPT

## 2021-09-29 PROCEDURE — 81241 F5 GENE: CPT

## 2021-09-30 DIAGNOSIS — N83.8 RIGHT TUBO-OVARIAN MASS: ICD-10-CM

## 2021-09-30 DIAGNOSIS — Z00.00 HEALTHCARE MAINTENANCE: ICD-10-CM

## 2021-10-01 LAB
B2 GLYCOPROT1 IGA SER-ACNC: <10 SAU (ref 0–20)
B2 GLYCOPROT1 IGG SERPL IA-ACNC: <10 SGU (ref 0–20)
B2 GLYCOPROT1 IGM SERPL IA-ACNC: <10 SMU (ref 0–20)
CARDIOLIPIN IGA SER IA-ACNC: <10 APL (ref 0–11)
CARDIOLIPIN IGG SER IA-ACNC: <10 GPL (ref 0–14)
CARDIOLIPIN IGM SER IA-ACNC: 13 MPL (ref 0–12)

## 2021-10-03 LAB
F2 C.20210G>A GENO BLD/T: NEGATIVE
F5 P.R506Q BLD/T QL: ABNORMAL

## 2021-10-05 PROBLEM — D68.51 HETEROZYGOUS FACTOR V LEIDEN MUTATION (HCC): Status: ACTIVE | Noted: 2021-10-05

## 2021-10-08 DIAGNOSIS — D68.51 HETEROZYGOUS FACTOR V LEIDEN MUTATION (HCC): ICD-10-CM

## 2021-10-14 ENCOUNTER — OFFICE VISIT (OUTPATIENT)
Dept: VASCULAR LAB | Facility: MEDICAL CENTER | Age: 31
End: 2021-10-14
Attending: FAMILY MEDICINE
Payer: COMMERCIAL

## 2021-10-14 VITALS
BODY MASS INDEX: 31.39 KG/M2 | SYSTOLIC BLOOD PRESSURE: 118 MMHG | HEIGHT: 67 IN | WEIGHT: 200 LBS | HEART RATE: 85 BPM | DIASTOLIC BLOOD PRESSURE: 77 MMHG

## 2021-10-14 DIAGNOSIS — Z30.09 FAMILY PLANNING COUNSELING: ICD-10-CM

## 2021-10-14 DIAGNOSIS — Z86.718 HISTORY OF DVT (DEEP VEIN THROMBOSIS): ICD-10-CM

## 2021-10-14 DIAGNOSIS — D68.51 HETEROZYGOUS FACTOR V LEIDEN MUTATION (HCC): ICD-10-CM

## 2021-10-14 DIAGNOSIS — Z79.01 CHRONIC ANTICOAGULATION: ICD-10-CM

## 2021-10-14 DIAGNOSIS — Z97.5 INTRAUTERINE CONTRACEPTIVE DEVICE: ICD-10-CM

## 2021-10-14 PROCEDURE — 99212 OFFICE O/P EST SF 10 MIN: CPT

## 2021-10-14 PROCEDURE — 99214 OFFICE O/P EST MOD 30 MIN: CPT | Performed by: FAMILY MEDICINE

## 2021-10-14 ASSESSMENT — ENCOUNTER SYMPTOMS
CHILLS: 0
VOMITING: 0
WHEEZING: 0
BLOOD IN STOOL: 0
FEVER: 0
SEIZURES: 0
HEMOPTYSIS: 0
COUGH: 0
NAUSEA: 0
HEADACHES: 0
MYALGIAS: 0
CLAUDICATION: 0
DIARRHEA: 0
PALPITATIONS: 0
BRUISES/BLEEDS EASILY: 0
SHORTNESS OF BREATH: 0
WEAKNESS: 0
FOCAL WEAKNESS: 0
DIZZINESS: 0
TREMORS: 0

## 2021-10-14 ASSESSMENT — FIBROSIS 4 INDEX: FIB4 SCORE: 0.44

## 2021-10-14 NOTE — PROGRESS NOTES
FOLLOW-UP VASCULAR ANTICOAGULATION VISIT  Subjective:   Cassie Powers is a female who presents today 10/14/21 for   Chief Complaint   Patient presents with   • Follow-Up     Initially referred by Anita Domínguez P.A.-C. for length of therapy (LOT) determination and management of anticoagulation in context of acute venothromboembolic disease    HPI:    VTE disease / Anticoagulation:   Interval hx/concerns: no changes, labs showed heFVL+   Current antithrombotic agent: eliquis 5mg BID   Complications: none, tolerating well, no bleeding or bruising   Date of initiation of anticoagulation: 6/23/21   Adherence: missed a few doses,    _____Pertinent VTE pmhx:   Date of Diagnosis: 6/23/21   Type of Venous thromboembolic disease (VTE): acute R pelvic vein DVT   Preceding/presenting symptoms: R lower pelvic pain, acute.  Has Mirena IUD placed >1yr ago.    Antithrombotic therapy at time of VTE event: no  VTE tx course: Eliquis 10mg BID x 7d, now eliquis 5mg BID   Any personal VTE hx? No  Any family VTE hx? No  _____UNPROVOKED VS PROVOKED:   Recent surgery ? No  Recent trauma ? No  Smoker?  reports that she has never smoked. She has never used smokeless tobacco.    Extended travel? No  Other periods of immobility? No  Other known or potential risk factors for VTE disease:  no  WOMEN: Hx of cancer or abnormal cancer screenings: no       Any estrogen, testosterone HRT, E2 birth control, tamoxifene, raloxifene: Mirena IUD       Hx of recurrent miscarriages: no  Hypercoaguability work-up completed?  no (see assessment for details)    Family History   Problem Relation Age of Onset   • Thyroid Mother    • Hypertension Father    • Clotting Disorder Neg Hx      Social History     Tobacco Use   • Smoking status: Never Smoker   • Smokeless tobacco: Never Used   Vaping Use   • Vaping Use: Never used   Substance Use Topics   • Alcohol use: Yes     Comment: occ    • Drug use: Never     DIET AND EXERCISE:  Weight Change:stable  "  BMI Readings from Last 5 Encounters:   10/14/21 31.32 kg/m²   08/20/21 32.56 kg/m²   08/13/21 33.05 kg/m²   06/29/21 32.95 kg/m²   06/23/21 32.87 kg/m²     Diet: common adult  Exercise: minimal exercise     Review of Systems   Constitutional: Negative for chills, fever and malaise/fatigue.   HENT: Negative for nosebleeds.    Respiratory: Negative for cough, hemoptysis, shortness of breath and wheezing.    Cardiovascular: Negative for chest pain, palpitations, claudication and leg swelling.   Gastrointestinal: Negative for abdominal pain, blood in stool, diarrhea, nausea and vomiting.   Genitourinary: Negative for dysuria and flank pain.   Musculoskeletal: Negative for joint pain and myalgias.   Skin: Negative for itching and rash.   Neurological: Negative for dizziness, tremors, focal weakness, seizures, weakness and headaches.   Endo/Heme/Allergies: Does not bruise/bleed easily.      Objective:     Vitals:    10/14/21 0941   BP: 118/77   BP Location: Left arm   Patient Position: Sitting   BP Cuff Size: Adult   Pulse: 85   Weight: 90.7 kg (200 lb)   Height: 1.702 m (5' 7\")      BP Readings from Last 5 Encounters:   10/14/21 118/77   08/20/21 109/69   08/13/21 113/78   07/29/21 112/60   06/29/21 120/76      Body mass index is 31.32 kg/m².  Physical Exam  Constitutional:       Appearance: Normal appearance.   HENT:      Head: Normocephalic.   Eyes:      Extraocular Movements: Extraocular movements intact.      Conjunctiva/sclera: Conjunctivae normal.   Pulmonary:      Effort: Pulmonary effort is normal.   Musculoskeletal:      Cervical back: Normal range of motion.   Skin:     General: Skin is dry.      Coloration: Skin is not pale.      Findings: No rash.   Neurological:      General: No focal deficit present.      Mental Status: She is alert and oriented to person, place, and time.   Psychiatric:         Mood and Affect: Mood normal.         Behavior: Behavior normal.                  Lab Results   Component Value " Date    SODIUM 135 2021    POTASSIUM 3.8 2021    CHLORIDE 101 2021    CO2 22 2021    GLUCOSE 88 2021    BUN 8 2021    CREATININE 0.68 2021    IFAFRICA >60 2021    IFNOTAFR >60 2021        Lab Results   Component Value Date    WBC 3.3 (L) 2021    RBC 5.22 2021    HEMOGLOBIN 15.1 2021    HEMATOCRIT 44.9 2021    MCV 86.0 2021    MCH 28.9 2021    MCHC 33.6 2021    MPV 8.7 (L) 2021         Ref. Range 2021 11:02   Anti-Cardiolipin Ab Iga Latest Ref Range: 0 - 11 APL <10   Anti-Cardiolipin Ab Igm Latest Ref Range: 0 - 12 MPL 13 (H)   Anti-Cariolipin Ab Igg Latest Ref Range: 0 - 14 GPL <10   Beta-2 Glycoprotein I Ab Igg Latest Ref Range: 0 - 20 SGU <10   Beta-2 Glycoprotein I Iga Latest Ref Range: 0 - 20 EMELIA <10   Beta-2 Glycoprotein I Igm Latest Ref Range: 0 - 20 SMU <10      Ref. Range 2021 11:02   Factor Ii Dna Analysis Pt Gene Unknown Negative   V Leiden Factor Unknown Heterozygous (A)       VASCULAR IMAGING:     Last EKG:   Results for orders placed or performed during the hospital encounter of 21   EKG   Result Value Ref Range    Report       [facility] Emergency Dept.    Test Date:  2021  Pt Name:    CHRIS ALEXANDER             Department: [facility]  MRN:        [id number]                      Room:  Gender:     Female                       Technician: CARLOS A  :        1990                   Requested By:ER TRIAGE PROTOCOL  Order #:    [id number]                    Reading MD:    Measurements  Intervals                                Axis  Rate:       101                          P:          26  CT:         118                          QRS:        23  QRSD:       92                           T:          51  QT:         340  QTc:        441    Interpretive Statements  Sinus tachycardia  No previous ECG available for comparison          CT abd/pelvis 21   1.  Right  ovarian vein thrombophlebitis and thrombosis of right internal iliac vein. There are additional prominent dilated tubular structures in the pelvis on the right which probably represent dilated thrombosed veins with adjacent fat stranding   possibly related to edema. An underlying inflammatory process in the right adnexa cannot be excluded. Consider further characterization with pelvic ultrasound. Gynecology consultation is suggested.  2.  Mild right-sided urinary bladder wall thickening which could be reactive however correlate with urinalysis.  3.  IUD is well positioned.  4.  Normal appendix.    US pelvis 6/23/21   1.  Complex right adnexal tubular structure of uncertain etiology, possibly dilated fallopian tube with irregular contents versus partially thrombosed vessels/vein.  2.  Right ovary contains multiple small follicles. Blood flow could not be obtained within the right ovary which is indeterminate and possibly technical versus ovarian torsion. The ovaries not particularly enlarged.  3.  The left ovary is not visualized.  4.  Gynecology consultation is suggested.    US pelvis 9/29/21   Normal transvaginal appearance of the pelvis.  No evidence of thrombosed vascular structure about the right ovary.     Medical Decision Making:  Today's Assessment / Status / Plan:     1. Heterozygous factor V Leiden mutation (HCC)  REFERRAL TO ANTICOAGULATION MONITORING    CBC WITHOUT DIFFERENTIAL    Basic Metabolic Panel   2. Chronic anticoagulation  REFERRAL TO ANTICOAGULATION MONITORING    CBC WITHOUT DIFFERENTIAL    Basic Metabolic Panel   3. History of DVT (deep vein thrombosis)  REFERRAL TO ANTICOAGULATION MONITORING   4. Family planning counseling     5. 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  Reviewed that reduced dose not studied in thrombophilia   -Though FVL provides a 2-3 fold increased RR of 1st episode of VTE, it is generally in the context of a secondary provoking factor.    Though presence of hetero FVL+ is not an absolute indication of lifelong OAC, it does strengthen the case of it in certain patients.  Antithrombotic therapy plan:  - continue eliquis 5mg BID indefinitely, annual risk/benefit review   - will need preconception counseling with perinatology for FVL mgmt including transition to lovenox during preg to 6 weeks post-partum - she has mirena IUD currently   - if unplanned preg, positive Urine hcg, should stop eliquis immediately and contact our office to start lovenox   - stressed strict adherence to tx and avoid early termination due to increased risk for recurrent VTE  - counseled on signs and symptoms of acute VTE that require seeking prompt attention in the ED to include shortness of breath, chest pain, pain with deep inhalation, acute leg swelling and/or pain in calf or leg, and worsening pelvic pain  - elevate legs as much as possible, use compression stockings/socks if directed by your provider  - Avoid hormonal therapies including estrogen or testosterone-containing meds, or raloxifene or tamoxifene (commonly used for osteoporosis)  - Avoid sedentary periods  - continue complete avoidance of tobacco  "products  - if having any invasive procedure,please make sure the doctor knows of your history of blood clots and current anticoagulation status  - avoid Aspirin and anti-inflammatories (eg. Advil, ibuprofen, Aleve, naproxen, etc) while anticoagulated   - avoid skiing or other dangerous activities to reduce risk of head injury and brain bleeds  - recommended to see your PCP to discuss if you need age-appropriate cancer screenings as a small % of blood clots may be caused by an underlying malignancy  - if any bleeding lasting 30min without stopping, please seek care with your PCP, urgent care, or ED  - reversal agents for most blood thinners are now available and used if you have major bleeding    LIPID MANAGEMENT:   Qualifies for Statin Therapy Based on 2018 ACC/AHA Guidelines: no  10-yr ASCVD risk score: <5% \"low risk\"  Major ASCVD events: None  High-risk conditions: None   Risk-enhancers: N/A  Currently on Statin: No  Treatment goals: LDL-C <100 (consider non-HDL-C <130, apoB <90)  At goal? N/A  Plan:   - reinforced ongoing TLC measures as noted   - defer lab surveillance to PCP   Meds: none at this time      BLOOD PRESSURE MANAGEMENT:  ACC/AHA (2017) goal <130/80  Home BP at goal: yes  Office BP at goal:  yes   Plan:   - continue healthy diet, activity, weight mgmt   Monitoring:   - routine clinic-based BP measurements at least once annually   Medications: no meds indicated at this time       GLYCEMIC STATUS:  Normal    LIFESTYLE RECOMMENDATIONS:     Smoking:  reports that she has never smoked. She has never used smokeless tobacco.   - continued complete avoidance of all tobacco products     Physical Activity: continue healthy activity to improve CV fitness, see care instructions for additional details     Weight Management and Nutrition: Dietary plan was discussed with patient at this visit including DASH, low sodium and/or as outlined in care instructions     OTHER:    # family planning  - avoidance of " E2-containing modalities in the future  - recommend use of IUD as ok, unless cause of PID and needs retrieval to reduce risk of recurrent PID and/or VTE events - defer to gyn   - reviewed preconception and DOAC mgmt counseling as noted above     Instructed to follow-up with PCP for remainder of adult medical needs: yes  We will partner with other providers in the management of established vascular disease and cardiometabolic risk factors.    Studies to Be Obtained: NONE   Labs to Be Obtained: as noted above     Follow up in: Q6mo with [facility] clinic     Time: 33min - chart review/prep, review of other providers' records, imaging/lab review, face-to-face time for history/examination, ordering, prescribing,  review of results/meds/ treatment plan with patient/family/caregiver, documentation in EMR, care coordination (as needed)      Josh Pompa M.D.  [facility]   [facility]   [phone]

## 2021-10-15 PROBLEM — Z97.5 INTRAUTERINE CONTRACEPTIVE DEVICE: Status: ACTIVE | Noted: 2021-10-15

## 2021-10-15 PROBLEM — Z86.718 HISTORY OF DVT (DEEP VEIN THROMBOSIS): Status: ACTIVE | Noted: 2021-10-15

## 2021-10-15 PROBLEM — Z30.09 FAMILY PLANNING COUNSELING: Status: ACTIVE | Noted: 2021-07-08

## 2021-10-15 ASSESSMENT — ENCOUNTER SYMPTOMS
FLANK PAIN: 0
ABDOMINAL PAIN: 0

## 2021-12-30 ENCOUNTER — ANTICOAGULATION MONITORING (OUTPATIENT)
Dept: VASCULAR LAB | Facility: MEDICAL CENTER | Age: 31
End: 2021-12-30

## 2021-12-30 DIAGNOSIS — I82.890 ACUTE DEEP VEIN THROMBOSIS OF PELVIC VEIN: ICD-10-CM

## 2022-03-23 DIAGNOSIS — Z00.00 HEALTHCARE MAINTENANCE: ICD-10-CM

## 2022-04-11 ENCOUNTER — HOSPITAL ENCOUNTER (OUTPATIENT)
Dept: LAB | Facility: MEDICAL CENTER | Age: 32
End: 2022-04-11
Attending: NURSE PRACTITIONER
Payer: COMMERCIAL

## 2022-04-11 ENCOUNTER — HOSPITAL ENCOUNTER (OUTPATIENT)
Dept: LAB | Facility: MEDICAL CENTER | Age: 32
End: 2022-04-11
Attending: FAMILY MEDICINE
Payer: COMMERCIAL

## 2022-04-11 DIAGNOSIS — D68.51 HETEROZYGOUS FACTOR V LEIDEN MUTATION (HCC): ICD-10-CM

## 2022-04-11 DIAGNOSIS — Z00.00 HEALTHCARE MAINTENANCE: ICD-10-CM

## 2022-04-11 DIAGNOSIS — Z79.01 CHRONIC ANTICOAGULATION: ICD-10-CM

## 2022-04-11 LAB
ANION GAP SERPL CALC-SCNC: 10 MMOL/L (ref 7–16)
BUN SERPL-MCNC: 9 MG/DL (ref 8–22)
CALCIUM SERPL-MCNC: 10 MG/DL (ref 8.5–10.5)
CHLORIDE SERPL-SCNC: 104 MMOL/L (ref 96–112)
CO2 SERPL-SCNC: 24 MMOL/L (ref 20–33)
CREAT SERPL-MCNC: 0.72 MG/DL (ref 0.5–1.4)
ERYTHROCYTE [DISTWIDTH] IN BLOOD BY AUTOMATED COUNT: 41.6 FL (ref 35.9–50)
GFR SERPLBLD CREATININE-BSD FMLA CKD-EPI: 115 ML/MIN/1.73 M 2
GLUCOSE SERPL-MCNC: 69 MG/DL (ref 65–99)
HCT VFR BLD AUTO: 44.1 % (ref 37–47)
HGB BLD-MCNC: 14.2 G/DL (ref 12–16)
MCH RBC QN AUTO: 28.5 PG (ref 27–33)
MCHC RBC AUTO-ENTMCNC: 32.2 G/DL (ref 33.6–35)
MCV RBC AUTO: 88.6 FL (ref 81.4–97.8)
PLATELET # BLD AUTO: 431 K/UL (ref 164–446)
PMV BLD AUTO: 9.1 FL (ref 9–12.9)
POTASSIUM SERPL-SCNC: 3.9 MMOL/L (ref 3.6–5.5)
RBC # BLD AUTO: 4.98 M/UL (ref 4.2–5.4)
SODIUM SERPL-SCNC: 138 MMOL/L (ref 135–145)
WBC # BLD AUTO: 7.7 K/UL (ref 4.8–10.8)

## 2022-04-11 PROCEDURE — 84479 ASSAY OF THYROID (T3 OR T4): CPT

## 2022-04-11 PROCEDURE — 80048 BASIC METABOLIC PNL TOTAL CA: CPT

## 2022-04-11 PROCEDURE — 84443 ASSAY THYROID STIM HORMONE: CPT

## 2022-04-11 PROCEDURE — 36415 COLL VENOUS BLD VENIPUNCTURE: CPT

## 2022-04-11 PROCEDURE — 84439 ASSAY OF FREE THYROXINE: CPT

## 2022-04-11 PROCEDURE — 85027 COMPLETE CBC AUTOMATED: CPT

## 2022-04-12 LAB
T4 FREE SERPL-MCNC: 1.27 NG/DL (ref 0.93–1.7)
TSH SERPL DL<=0.005 MIU/L-ACNC: 3.49 UIU/ML (ref 0.38–5.33)

## 2022-04-13 LAB — T3RU NFR SERPL: 36 % (ref 28–41)

## 2022-04-22 ENCOUNTER — ANTICOAGULATION VISIT (OUTPATIENT)
Dept: VASCULAR LAB | Facility: MEDICAL CENTER | Age: 32
End: 2022-04-22
Attending: INTERNAL MEDICINE
Payer: COMMERCIAL

## 2022-04-22 DIAGNOSIS — I82.890 ACUTE DEEP VEIN THROMBOSIS OF PELVIC VEIN: ICD-10-CM

## 2022-04-22 PROCEDURE — 99212 OFFICE O/P EST SF 10 MIN: CPT

## 2022-04-22 RX ORDER — DULOXETIN HYDROCHLORIDE 20 MG/1
20 CAPSULE, DELAYED RELEASE ORAL DAILY
COMMUNITY
End: 2022-10-27

## 2022-04-22 NOTE — PROGRESS NOTES
Target end date: indefinite  Indication: Pelvic vein thrombosis  Drug: Eliquis 5 mg BID    Health Status Since Last Assessment   Patient denies any new relevant medical problems, ED visits or hospitalizations   Patient denies any embolic events (stroke/tia/systemic embolism)    Adherence with DOAC Therapy   Pt has occasionally missed doses in the average week    Bleeding Risk Assessment     no Epistaxis   Pt denies any excessive or unusual bleeding/hematomas.  Pt denies any GI bleeds or hematemesis.  Pt denies any concerning daily headache or sub dural hematoma symptoms.     Pt denies any hematuria   Latest Hemoglobin 14.2   ETOH overuse occasional cocktails      Creatinine Clearance/Renal Function     Latest ClCr > 50 mL/min    Hepatic function   Latest LFTs WNL   Pt denies any history of liver dysfunction      Drug Interactions   Platelets: 431   ASA/other antiplatelets none   NSAID none   Other drug interactions no   Verified no anticonvulsant or azole therapy, education provided for future use.     Examination   Blood Pressure 118/78   Symptomatic hypotension no   Significant gait impairment/imbalance/high risk for falls? no    Final Assessment and Recommendations:   Patient appears stable from the anticoagulation standpoint.     Benefits of continued DOAC therapy outweigh risks for this patient   Recommend pt continue with current DOAC therapy Eliquis 5 BID (with dose adjustment)   DOAC is affordable    Follow up:   Will follow up with patient 3  months.      Brualio Rain, Pharmacy Intern  Yakelin Recinos, MarileeD

## 2022-05-13 ENCOUNTER — PATIENT MESSAGE (OUTPATIENT)
Dept: VASCULAR LAB | Facility: MEDICAL CENTER | Age: 32
End: 2022-05-13
Payer: COMMERCIAL

## 2022-05-13 DIAGNOSIS — Z79.01 CHRONIC ANTICOAGULATION: ICD-10-CM

## 2022-05-13 RX ORDER — APIXABAN 5 MG/1
TABLET, FILM COATED ORAL
COMMUNITY
Start: 2022-04-06 | End: 2022-05-13 | Stop reason: SDUPTHER

## 2022-05-16 RX ORDER — APIXABAN 5 MG/1
5 TABLET, FILM COATED ORAL 2 TIMES DAILY
Qty: 180 TABLET | Refills: 1 | Status: SHIPPED | OUTPATIENT
Start: 2022-05-16 | End: 2022-05-17 | Stop reason: SDUPTHER

## 2022-05-17 DIAGNOSIS — Z79.01 CHRONIC ANTICOAGULATION: ICD-10-CM

## 2022-05-17 RX ORDER — APIXABAN 5 MG/1
5 TABLET, FILM COATED ORAL 2 TIMES DAILY
Qty: 120 TABLET | Refills: 3 | Status: SHIPPED | OUTPATIENT
Start: 2022-05-17 | End: 2022-05-17 | Stop reason: SDUPTHER

## 2022-05-17 RX ORDER — APIXABAN 5 MG/1
5 TABLET, FILM COATED ORAL 2 TIMES DAILY
Qty: 120 TABLET | Refills: 3 | Status: SHIPPED | OUTPATIENT
Start: 2022-05-17 | End: 2022-05-18 | Stop reason: SDUPTHER

## 2022-05-18 ENCOUNTER — DOCUMENTATION (OUTPATIENT)
Dept: PHARMACY | Facility: MEDICAL CENTER | Age: 32
End: 2022-05-18
Payer: COMMERCIAL

## 2022-05-18 ENCOUNTER — TELEPHONE (OUTPATIENT)
Dept: VASCULAR LAB | Facility: MEDICAL CENTER | Age: 32
End: 2022-05-18
Payer: COMMERCIAL

## 2022-05-18 DIAGNOSIS — Z79.01 CHRONIC ANTICOAGULATION: ICD-10-CM

## 2022-05-18 RX ORDER — APIXABAN 5 MG/1
5 TABLET, FILM COATED ORAL 2 TIMES DAILY
Qty: 120 TABLET | Refills: 3 | Status: SHIPPED | OUTPATIENT
Start: 2022-05-18 | End: 2022-10-27 | Stop reason: SDUPTHER

## 2022-05-18 NOTE — PROGRESS NOTES
Copay card for Eliquis:    ROMAN: [id number]  PCN: [id number]  GRP:[id number]  ID:[id number]    Angelique Blandon  Rx Coordinator   [phone]

## 2022-05-18 NOTE — TELEPHONE ENCOUNTER
Contacted patient at [phone] to discuss [facility] and services/benefits offered. No answer, left voicemail.    Angelique Blandon  Rx Coordinator   [phone]

## 2022-06-28 ENCOUNTER — TELEPHONE (OUTPATIENT)
Dept: VASCULAR LAB | Facility: MEDICAL CENTER | Age: 32
End: 2022-06-28
Payer: COMMERCIAL

## 2022-06-28 NOTE — TELEPHONE ENCOUNTER
Called patient and relayed below information via voicemail.         Josh Pompa M.D.  Khris Venegas Ass't  Last seen 10/2021.  My f/u plan was for her to be seen every 6mo with the [facility] clinic for ongoing monitoring as per standard protocol.  She is welcome to talk with her PCP to see if PCP wants to assume her AC monitoring and Rx over time as well.   Thanks         ----- Message -----   From: Khris Venegas Ass't   Sent: 6/23/2022   8:06 AM PDT   To: Josh Pompa M.D.   Subject: FW: Appointments                                 Is this okay with you?   ----- Message -----   From: Cassie Powers   Sent: 6/22/2022   6:34 PM PDT   To: [facility]   Subject: Appointments                                     Hi there,     At this time I am a year out from my blood clot diagnosis and have not had any issues since. My insurance has an extremely high deductible, which makes appointments with this office every three months extremely expensive.     I am wondering if we can stretch out the time in between appointments as I take my medication every day as directed and have had no issues.     As I did it before I really can’t afford to be seen this often and would like to work something else out.     Thanks       Yes

## 2022-07-31 PROCEDURE — RXMED WILLOW AMBULATORY MEDICATION CHARGE: Performed by: NURSE PRACTITIONER

## 2022-08-01 ENCOUNTER — PHARMACY VISIT (OUTPATIENT)
Dept: PHARMACY | Facility: MEDICAL CENTER | Age: 32
End: 2022-08-01
Payer: COMMERCIAL

## 2022-09-14 ENCOUNTER — PHARMACY VISIT (OUTPATIENT)
Dept: PHARMACY | Facility: MEDICAL CENTER | Age: 32
End: 2022-09-14
Payer: COMMERCIAL

## 2022-09-14 PROCEDURE — RXMED WILLOW AMBULATORY MEDICATION CHARGE: Performed by: NURSE PRACTITIONER

## 2022-10-11 PROCEDURE — RXMED WILLOW AMBULATORY MEDICATION CHARGE: Performed by: NURSE PRACTITIONER

## 2022-10-14 ENCOUNTER — PHARMACY VISIT (OUTPATIENT)
Dept: PHARMACY | Facility: MEDICAL CENTER | Age: 32
End: 2022-10-14
Payer: COMMERCIAL

## 2022-10-27 ENCOUNTER — ANTICOAGULATION VISIT (OUTPATIENT)
Dept: VASCULAR LAB | Facility: MEDICAL CENTER | Age: 32
End: 2022-10-27
Attending: FAMILY MEDICINE
Payer: COMMERCIAL

## 2022-10-27 VITALS — HEART RATE: 71 BPM | DIASTOLIC BLOOD PRESSURE: 68 MMHG | SYSTOLIC BLOOD PRESSURE: 102 MMHG

## 2022-10-27 DIAGNOSIS — Z79.01 CHRONIC ANTICOAGULATION: ICD-10-CM

## 2022-10-27 DIAGNOSIS — I82.890 ACUTE DEEP VEIN THROMBOSIS OF PELVIC VEIN: ICD-10-CM

## 2022-10-27 PROCEDURE — 99211 OFF/OP EST MAY X REQ PHY/QHP: CPT

## 2022-10-27 RX ORDER — APIXABAN 5 MG/1
5 TABLET, FILM COATED ORAL 2 TIMES DAILY
Qty: 180 TABLET | Refills: 3 | Status: SHIPPED | OUTPATIENT
Start: 2022-10-27 | End: 2023-07-14 | Stop reason: SDUPTHER

## 2022-10-27 NOTE — PROGRESS NOTES
Target end date: indefinite  Indication: Pelvic vein thrombosis  Drug: Eliquis 5 mg BID    Health Status Since Last Assessment   Patient denies any new relevant medical problems, ED visits or hospitalizations   Patient denies any embolic events (stroke/tia/systemic embolism)    Adherence with DOAC Therapy   Pt has none missed any doses in the average week    Bleeding Risk Assessment     Denies Epistaxis   Pt denies any excessive or unusual bleeding/hematomas.  Pt denies any GI bleeds or hematemesis.  Pt denies any concerning daily headache or sub dural hematoma symptoms.     Pt denies any hematuria no   Latest Hemoglobin 14.2   ETOH overuse Occasional cocktails     Creatinine Clearance/Renal Function     Latest ClCr 162 ml/min    Hepatic function   Latest LFTs- WNL   Pt denies any history of liver dysfunction      Drug Interactions   Platelets: 431   ASA/other antiplatelets abstains   NSAID none   Other drug interactions none   Verified no anticonvulsant or azole therapy, education provided for future use.     Examination   Blood Pressure 102/68   Symptomatic hypotension none   Significant gait impairment/imbalance/high risk for falls? no    Final Assessment and Recommendations:   Patient appears stable from the anticoagulation standpoint.     Benefits of continued DOAC therapy outweigh risks for this patient   Recommend pt continue with current DOAC therapy    DOAC is affordable - getting through [facility] mail order     Other Actions: cmp/ cbc hemogram ordered prior to next visit    Follow up:   Will follow up with patient 9 months. Patient on high deductible plan and prefers to be on extended interval.      Flavia Vazquez, MarileeD

## 2022-12-09 ENCOUNTER — PHARMACY VISIT (OUTPATIENT)
Dept: PHARMACY | Facility: MEDICAL CENTER | Age: 32
End: 2022-12-09
Payer: COMMERCIAL

## 2022-12-09 PROCEDURE — RXMED WILLOW AMBULATORY MEDICATION CHARGE: Performed by: FAMILY MEDICINE

## 2023-01-24 PROCEDURE — RXMED WILLOW AMBULATORY MEDICATION CHARGE: Performed by: FAMILY MEDICINE

## 2023-01-26 ENCOUNTER — PHARMACY VISIT (OUTPATIENT)
Dept: PHARMACY | Facility: MEDICAL CENTER | Age: 33
End: 2023-01-26
Payer: COMMERCIAL

## 2023-02-17 ENCOUNTER — NON-PROVIDER VISIT (OUTPATIENT)
Dept: URGENT CARE | Facility: PHYSICIAN GROUP | Age: 33
End: 2023-02-17

## 2023-02-17 DIAGNOSIS — Z02.1 PRE-EMPLOYMENT DRUG SCREENING: ICD-10-CM

## 2023-02-17 LAB
AMP AMPHETAMINE: NORMAL
BREATH ALCOHOL COMMENT: NORMAL
COC COCAINE: NORMAL
INT CON NEG: NORMAL
INT CON POS: NORMAL
MET METHAMPHETAMINES: NORMAL
OPI OPIATES: NORMAL
PCP PHENCYCLIDINE: NORMAL
POC BREATHALIZER: 0 PERCENT (ref 0–0.01)
POC DRUG COMMENT 753798-OCCUPATIONAL HEALTH: NEGATIVE
THC: NORMAL

## 2023-02-17 PROCEDURE — 82075 ASSAY OF BREATH ETHANOL: CPT | Performed by: PHYSICIAN ASSISTANT

## 2023-02-17 PROCEDURE — 80305 DRUG TEST PRSMV DIR OPT OBS: CPT | Performed by: PHYSICIAN ASSISTANT

## 2023-04-10 PROCEDURE — RXMED WILLOW AMBULATORY MEDICATION CHARGE: Performed by: FAMILY MEDICINE

## 2023-04-12 ENCOUNTER — PHARMACY VISIT (OUTPATIENT)
Dept: PHARMACY | Facility: MEDICAL CENTER | Age: 33
End: 2023-04-12
Payer: COMMERCIAL

## 2023-07-12 ENCOUNTER — HOSPITAL ENCOUNTER (OUTPATIENT)
Dept: LAB | Facility: MEDICAL CENTER | Age: 33
End: 2023-07-12
Attending: FAMILY MEDICINE
Payer: COMMERCIAL

## 2023-07-12 DIAGNOSIS — I82.890 ACUTE DEEP VEIN THROMBOSIS OF PELVIC VEIN: ICD-10-CM

## 2023-07-12 LAB
ALBUMIN SERPL BCP-MCNC: 4.4 G/DL (ref 3.2–4.9)
ALBUMIN/GLOB SERPL: 1.4 G/DL
ALP SERPL-CCNC: 97 U/L (ref 30–99)
ALT SERPL-CCNC: 14 U/L (ref 2–50)
ANION GAP SERPL CALC-SCNC: 11 MMOL/L (ref 7–16)
AST SERPL-CCNC: 10 U/L (ref 12–45)
BILIRUB SERPL-MCNC: 0.2 MG/DL (ref 0.1–1.5)
BUN SERPL-MCNC: 17 MG/DL (ref 8–22)
CALCIUM ALBUM COR SERPL-MCNC: 9.1 MG/DL (ref 8.5–10.5)
CALCIUM SERPL-MCNC: 9.4 MG/DL (ref 8.5–10.5)
CHLORIDE SERPL-SCNC: 106 MMOL/L (ref 96–112)
CO2 SERPL-SCNC: 25 MMOL/L (ref 20–33)
CREAT SERPL-MCNC: 0.84 MG/DL (ref 0.5–1.4)
GFR SERPLBLD CREATININE-BSD FMLA CKD-EPI: 95 ML/MIN/1.73 M 2
GLOBULIN SER CALC-MCNC: 3.1 G/DL (ref 1.9–3.5)
GLUCOSE SERPL-MCNC: 99 MG/DL (ref 65–99)
POTASSIUM SERPL-SCNC: 3.8 MMOL/L (ref 3.6–5.5)
PROT SERPL-MCNC: 7.5 G/DL (ref 6–8.2)
SODIUM SERPL-SCNC: 142 MMOL/L (ref 135–145)

## 2023-07-12 PROCEDURE — 36415 COLL VENOUS BLD VENIPUNCTURE: CPT

## 2023-07-12 PROCEDURE — 80053 COMPREHEN METABOLIC PANEL: CPT

## 2023-07-14 ENCOUNTER — ANTICOAGULATION VISIT (OUTPATIENT)
Dept: VASCULAR LAB | Facility: MEDICAL CENTER | Age: 33
End: 2023-07-14
Attending: INTERNAL MEDICINE
Payer: COMMERCIAL

## 2023-07-14 VITALS — HEART RATE: 89 BPM | DIASTOLIC BLOOD PRESSURE: 73 MMHG | SYSTOLIC BLOOD PRESSURE: 107 MMHG

## 2023-07-14 DIAGNOSIS — Z79.01 CHRONIC ANTICOAGULATION: ICD-10-CM

## 2023-07-14 DIAGNOSIS — I82.890 ACUTE DEEP VEIN THROMBOSIS OF PELVIC VEIN: ICD-10-CM

## 2023-07-14 PROCEDURE — 99212 OFFICE O/P EST SF 10 MIN: CPT

## 2023-07-14 PROCEDURE — 3078F DIAST BP <80 MM HG: CPT

## 2023-07-14 PROCEDURE — 3074F SYST BP LT 130 MM HG: CPT

## 2023-07-14 RX ORDER — APIXABAN 5 MG/1
5 TABLET, FILM COATED ORAL 2 TIMES DAILY
Qty: 60 TABLET | Refills: 11 | Status: SHIPPED | OUTPATIENT
Start: 2023-07-14

## 2023-07-14 NOTE — PROGRESS NOTES
Target end date: indefinite  Indication: Pelvic vein thrombosis  Drug: Eliquis 5 mg BID    Health Status Since Last Assessment  Patient denies any new relevant medical problems, ED visits or hospitalizations  Patient denies any embolic events (stroke/tia/systemic embolism)    Adherence with DOAC Therapy  Pt has 0 missed any doses in the average week    Bleeding Risk Assessment    No Epistaxis  Pt denies any excessive or unusual bleeding/hematomas.  Pt denies any GI bleeds or hematemesis.  Pt denies any concerning daily headache or sub dural hematoma symptoms.    Pt denies any hematuria None  Latest Hemoglobin None  ETOH overuse None     Creatinine Clearance/Renal Function    GFR-    Latest Reference Range & Units 07/12/23 15:56   GFR (CKD-EPI) >60 mL/min/1.73 m 2 95       Hepatic function  Latest LFTs    Latest Reference Range & Units 07/12/23 15:56   AST(SGOT) 12 - 45 U/L 10 (L)   ALT(SGPT) 2 - 50 U/L 14   (L): Data is abnormally low  Pt denies any history of liver dysfunction      Drug Interactions  Platelets:    Latest Reference Range & Units 04/11/22 12:29   Platelet Count 164 - 446 K/uL 431     ASA/other antiplatelets None  NSAID None  Other drug interactions   Verified no anticonvulsant or azole therapy, education provided for future use.     Examination  Blood Pressure 107/73  Symptomatic hypotension None  Significant gait impairment/imbalance/high risk for falls? None    Final Assessment and Recommendations:  Patient appears stable from the anticoagulation standpoint.    Benefits of continued DOAC therapy outweigh risks for this patient  Recommend pt continue with current DOAC therapy Eliquis 5 mg twice daily.    DOAC is affordable     Other Actions: cmp/ cbc hemogram ordered prior to next visit    Follow up:  Will follow up with patient 12 months.     Tc Salinas, Pharm.D, BCACP, BC-ADM

## 2023-07-19 PROCEDURE — RXMED WILLOW AMBULATORY MEDICATION CHARGE: Performed by: INTERNAL MEDICINE

## 2023-07-21 ENCOUNTER — PHARMACY VISIT (OUTPATIENT)
Dept: PHARMACY | Facility: MEDICAL CENTER | Age: 33
End: 2023-07-21
Payer: COMMERCIAL

## 2023-09-08 PROCEDURE — RXMED WILLOW AMBULATORY MEDICATION CHARGE: Performed by: INTERNAL MEDICINE

## 2023-09-11 ENCOUNTER — PHARMACY VISIT (OUTPATIENT)
Dept: PHARMACY | Facility: MEDICAL CENTER | Age: 33
End: 2023-09-11
Payer: COMMERCIAL

## 2023-10-12 DIAGNOSIS — I82.890 ACUTE DEEP VEIN THROMBOSIS OF PELVIC VEIN: ICD-10-CM

## 2023-10-24 PROCEDURE — RXMED WILLOW AMBULATORY MEDICATION CHARGE: Performed by: INTERNAL MEDICINE

## 2023-10-25 ENCOUNTER — PHARMACY VISIT (OUTPATIENT)
Dept: PHARMACY | Facility: MEDICAL CENTER | Age: 33
End: 2023-10-25
Payer: COMMERCIAL

## 2023-12-06 PROCEDURE — RXMED WILLOW AMBULATORY MEDICATION CHARGE: Performed by: INTERNAL MEDICINE

## 2023-12-07 ENCOUNTER — PHARMACY VISIT (OUTPATIENT)
Dept: PHARMACY | Facility: MEDICAL CENTER | Age: 33
End: 2023-12-07
Payer: COMMERCIAL

## 2023-12-28 ENCOUNTER — APPOINTMENT (OUTPATIENT)
Dept: RADIOLOGY | Facility: IMAGING CENTER | Age: 33
End: 2023-12-28
Attending: NURSE PRACTITIONER
Payer: COMMERCIAL

## 2023-12-28 ENCOUNTER — OFFICE VISIT (OUTPATIENT)
Dept: URGENT CARE | Facility: PHYSICIAN GROUP | Age: 33
End: 2023-12-28
Payer: COMMERCIAL

## 2023-12-28 VITALS
SYSTOLIC BLOOD PRESSURE: 100 MMHG | BODY MASS INDEX: 33.74 KG/M2 | HEIGHT: 67 IN | HEART RATE: 92 BPM | DIASTOLIC BLOOD PRESSURE: 70 MMHG | TEMPERATURE: 98.5 F | RESPIRATION RATE: 20 BRPM | OXYGEN SATURATION: 97 % | WEIGHT: 215 LBS

## 2023-12-28 DIAGNOSIS — R06.02 SOB (SHORTNESS OF BREATH): ICD-10-CM

## 2023-12-28 DIAGNOSIS — J02.9 PHARYNGITIS, UNSPECIFIED ETIOLOGY: ICD-10-CM

## 2023-12-28 DIAGNOSIS — R05.1 ACUTE COUGH: ICD-10-CM

## 2023-12-28 DIAGNOSIS — J10.1 INFLUENZA A: ICD-10-CM

## 2023-12-28 LAB
FLUAV RNA SPEC QL NAA+PROBE: POSITIVE
FLUBV RNA SPEC QL NAA+PROBE: NEGATIVE
RSV RNA SPEC QL NAA+PROBE: NEGATIVE
S PYO DNA SPEC NAA+PROBE: NOT DETECTED
SARS-COV-2 RNA RESP QL NAA+PROBE: NEGATIVE

## 2023-12-28 PROCEDURE — 0241U POCT CEPHEID COV-2, FLU A/B, RSV - PCR: CPT | Performed by: NURSE PRACTITIONER

## 2023-12-28 PROCEDURE — 71046 X-RAY EXAM CHEST 2 VIEWS: CPT | Mod: TC | Performed by: RADIOLOGY

## 2023-12-28 PROCEDURE — 3074F SYST BP LT 130 MM HG: CPT | Performed by: NURSE PRACTITIONER

## 2023-12-28 PROCEDURE — 87651 STREP A DNA AMP PROBE: CPT | Performed by: NURSE PRACTITIONER

## 2023-12-28 PROCEDURE — 3078F DIAST BP <80 MM HG: CPT | Performed by: NURSE PRACTITIONER

## 2023-12-28 PROCEDURE — 99214 OFFICE O/P EST MOD 30 MIN: CPT | Performed by: NURSE PRACTITIONER

## 2023-12-28 PROCEDURE — 1125F AMNT PAIN NOTED PAIN PRSNT: CPT | Performed by: NURSE PRACTITIONER

## 2023-12-28 RX ORDER — PREDNISONE 20 MG/1
40 TABLET ORAL DAILY
Qty: 10 TABLET | Refills: 0 | Status: SHIPPED | OUTPATIENT
Start: 2023-12-28 | End: 2024-01-02

## 2023-12-28 RX ORDER — ALBUTEROL SULFATE 90 UG/1
1-2 AEROSOL, METERED RESPIRATORY (INHALATION) EVERY 4 HOURS PRN
Qty: 8 G | Refills: 0 | Status: SHIPPED | OUTPATIENT
Start: 2023-12-28

## 2023-12-28 RX ORDER — BENZONATATE 200 MG/1
200 CAPSULE ORAL EVERY 8 HOURS PRN
Qty: 30 CAPSULE | Refills: 0 | Status: SHIPPED | OUTPATIENT
Start: 2023-12-28

## 2023-12-28 RX ORDER — OSELTAMIVIR PHOSPHATE 75 MG/1
75 CAPSULE ORAL 2 TIMES DAILY
Qty: 10 CAPSULE | Refills: 0 | Status: SHIPPED | OUTPATIENT
Start: 2023-12-28 | End: 2024-01-02

## 2023-12-28 ASSESSMENT — VISUAL ACUITY: OU: 1

## 2023-12-28 ASSESSMENT — PAIN SCALES - GENERAL: PAINLEVEL: 4=SLIGHT-MODERATE PAIN

## 2023-12-28 ASSESSMENT — ENCOUNTER SYMPTOMS
COUGH: 1
FEVER: 1
SORE THROAT: 1
SHORTNESS OF BREATH: 1

## 2023-12-28 ASSESSMENT — FIBROSIS 4 INDEX: FIB4 SCORE: 0.19

## 2023-12-28 NOTE — LETTER
December 28, 2023         Patient: Cassie See   YOB: 1990   Date of Visit: 12/28/2023           To Whom it May Concern:    Cassie See was seen in my clinic on 12/28/2023 due to illness. Patient may return to work 1/1/2024 if symptoms are improved.    If you have any questions or concerns, please don't hesitate to call.        Sincerely,         BOB Cheng.  Electronically Signed

## 2023-12-28 NOTE — PROGRESS NOTES
Subjective:     Cassie See is a 33 y.o. female who presents for Influenza (Patient is having flu like symptoms /Fever  started Tue,wed with fatigued, cough , SOB with cough  )       Cough  This is a new problem. The problem has been gradually worsening. Associated symptoms include a fever, a sore throat and shortness of breath. She has tried OTC cough suppressant for the symptoms. The treatment provided no relief.     Monday, patient started to develop cough.  Symptoms then started to worsen the following day.    Review of Systems   Constitutional:  Positive for fever and malaise/fatigue.   HENT:  Positive for congestion and sore throat.    Respiratory:  Positive for cough and shortness of breath.    All other systems reviewed and are negative.    Refer to HPI for additional details.    During this visit, appropriate PPE was worn, and hand hygiene was performed.    PMH:  has a past medical history of Anxiety, Obstetric blood-clot embolism, and Scoliosis.    MEDS:   Current Outpatient Medications:     oseltamivir (TAMIFLU) 75 MG Cap, Take 1 Capsule by mouth 2 times a day for 5 days., Disp: 10 Capsule, Rfl: 0    albuterol 108 (90 Base) MCG/ACT Aero Soln inhalation aerosol, Inhale 1-2 Puffs every four hours as needed for Shortness of Breath (and/or wheezing)., Disp: 8 g, Rfl: 0    predniSONE (DELTASONE) 20 MG Tab, Take 2 Tablets by mouth every day for 5 days., Disp: 10 Tablet, Rfl: 0    benzonatate (TESSALON) 200 MG capsule, Take 1 Capsule by mouth every 8 hours as needed for Cough., Disp: 30 Capsule, Rfl: 0    ELIQUIS 5 MG Tab, Take 1 Tablet by mouth 2 times a day., Disp: 60 Tablet, Rfl: 11    acetaminophen (TYLENOL) 500 MG Tab, Take 1,000 mg by mouth every 6 hours as needed for Moderate Pain., Disp: , Rfl:     Levonorgestrel (MIRENA, 52 MG, IU), 1 Each by Intrauterine route see administration instructions. Every 5 years, Disp: , Rfl:     ALLERGIES:   Allergies   Allergen Reactions    Latex Hives and  "Itching    Lavender Oil      SURGHX: History reviewed. No pertinent surgical history.  SOCHX:  reports that she has never smoked. She has never used smokeless tobacco. She reports current alcohol use. She reports that she does not use drugs.    FH: Per HPI as applicable/pertinent.      Objective:     /70 (BP Location: Left arm, Patient Position: Sitting, BP Cuff Size: Large adult)   Pulse 92   Temp 36.9 °C (98.5 °F) (Temporal)   Resp 20   Ht 1.702 m (5' 7\")   Wt 97.5 kg (215 lb)   SpO2 97%   BMI 33.67 kg/m²     Physical Exam  Nursing note reviewed.   Constitutional:       General: She is not in acute distress.     Appearance: She is well-developed. She is not ill-appearing or toxic-appearing.   HENT:      Head: Normocephalic.      Nose: Nose normal.      Mouth/Throat:      Mouth: Mucous membranes are moist.      Pharynx: Oropharynx is clear.   Eyes:      General: Vision grossly intact.      Extraocular Movements: Extraocular movements intact.      Conjunctiva/sclera: Conjunctivae normal.   Neck:      Trachea: Phonation normal.   Cardiovascular:      Rate and Rhythm: Normal rate and regular rhythm.      Heart sounds: Normal heart sounds.   Pulmonary:      Effort: Pulmonary effort is normal. No respiratory distress.      Breath sounds: Rhonchi present. No decreased breath sounds.   Musculoskeletal:         General: No deformity. Normal range of motion.      Cervical back: Normal range of motion.   Skin:     General: Skin is warm and dry.      Coloration: Skin is not pale.   Neurological:      Mental Status: She is alert and oriented to person, place, and time.      Motor: No weakness.   Psychiatric:         Behavior: Behavior normal. Behavior is cooperative.     Chest x-ray:    Details    Reading Physician Reading Date Result Priority   Lary Lowe M.D.  [phone] 12/28/2023      Narrative & Impression     12/28/2023 1:03 PM     HISTORY/REASON FOR EXAM:  Cough and shortness of breath for 3 days.   "   TECHNIQUE/EXAM DESCRIPTION AND NUMBER OF VIEWS:  Two views of the chest.     COMPARISON:  8/20/2021     FINDINGS:    The mediastinal and cardiac silhouette is unremarkable.     The pulmonary vascularity is within normal limits.     The lung parenchyma is clear.     There is no significant pleural effusion.     There is no visible pneumothorax.     There are no acute bony abnormalities.     IMPRESSION:     1.  Unremarkable two view chest.           Exam Ended: 12/28/23  1:08 PM Last Resulted: 12/28/23  1:25 PM           Radiology report and images reviewed by myself. Concur with findings.    POCT Cepheid CoV-2, Flu A/B, RSV by PCR: positive flu A    POCT Cepheid Group A Strep by PCR: negative      Assessment/Plan:     1. Influenza A  - oseltamivir (TAMIFLU) 75 MG Cap; Take 1 Capsule by mouth 2 times a day for 5 days.  Dispense: 10 Capsule; Refill: 0    2. Acute cough  - POCT CEPHEID COV-2, FLU A/B, RSV - PCR  - DX-CHEST-2 VIEWS; Future  - benzonatate (TESSALON) 200 MG capsule; Take 1 Capsule by mouth every 8 hours as needed for Cough.  Dispense: 30 Capsule; Refill: 0    3. Pharyngitis, unspecified etiology  - POCT CEPHEID GROUP A STREP - PCR    4. SOB (shortness of breath)  - albuterol 108 (90 Base) MCG/ACT Aero Soln inhalation aerosol; Inhale 1-2 Puffs every four hours as needed for Shortness of Breath (and/or wheezing).  Dispense: 8 g; Refill: 0  - predniSONE (DELTASONE) 20 MG Tab; Take 2 Tablets by mouth every day for 5 days.  Dispense: 10 Tablet; Refill: 0    Discussed likely self-limiting viral etiology and expected course and duration of illness. Vital signs stable, afebrile, no acute distress at this time.     Emphasize supportive measures, rest, fluids, and OTC medication PRN such as Tylenol. Rx as above sent electronically.     Standard precautions/mask/wash hands.    Monitor. Return precautions advised.     Differential diagnosis, natural history, supportive care, over-the-counter symptom management per  's instructions, close monitoring, and indications for immediate follow-up discussed.     All questions answered. Patient agrees with the plan of care.    Discharge summary provided.    Work note provided.    Billing note: moderate complexity and moderate risk. Established patient. [id number]. Please refer to LOS tool for details.

## 2024-01-16 PROCEDURE — RXMED WILLOW AMBULATORY MEDICATION CHARGE: Performed by: INTERNAL MEDICINE

## 2024-01-18 ENCOUNTER — PHARMACY VISIT (OUTPATIENT)
Dept: PHARMACY | Facility: MEDICAL CENTER | Age: 34
End: 2024-01-18
Payer: COMMERCIAL

## 2024-01-22 DIAGNOSIS — Z23 NEED FOR VACCINATION: ICD-10-CM

## 2024-01-22 NOTE — PROGRESS NOTES
Patient requesting shingles vaccination.  Send her a [employer] message stating that if she does not believe she has had chickenpox that I need to check these titers first.  If she has  no antibodies to varicella zoster she will need that vaccination.  If she has antibodies, she can then look into getting shingles vaccine although it is not recommended by the CDC until she is over 50.

## 2024-02-27 ENCOUNTER — TELEPHONE (OUTPATIENT)
Dept: MEDICAL GROUP | Facility: MEDICAL CENTER | Age: 34
End: 2024-02-27
Payer: COMMERCIAL

## 2024-02-27 NOTE — TELEPHONE ENCOUNTER
LVM asking patient to call back to discuss and clarify some concerns about a letter for her dog to become a service dog for her.

## 2024-03-07 ENCOUNTER — APPOINTMENT (OUTPATIENT)
Dept: LAB | Facility: MEDICAL CENTER | Age: 34
End: 2024-03-07
Payer: COMMERCIAL

## 2024-03-20 ENCOUNTER — HOSPITAL ENCOUNTER (OUTPATIENT)
Dept: LAB | Facility: MEDICAL CENTER | Age: 34
End: 2024-03-20
Attending: PHYSICIAN ASSISTANT
Payer: COMMERCIAL

## 2024-03-20 DIAGNOSIS — Z23 NEED FOR VACCINATION: ICD-10-CM

## 2024-03-20 PROCEDURE — 86787 VARICELLA-ZOSTER ANTIBODY: CPT

## 2024-03-20 PROCEDURE — 36415 COLL VENOUS BLD VENIPUNCTURE: CPT

## 2024-03-22 LAB — VZV IGG SER IA-ACNC: 580.7 IV

## 2024-03-28 ENCOUNTER — TELEPHONE (OUTPATIENT)
Dept: VASCULAR LAB | Facility: MEDICAL CENTER | Age: 34
End: 2024-03-28
Payer: COMMERCIAL

## 2024-03-28 NOTE — TELEPHONE ENCOUNTER
Caller: Cassie See      Topic/issue: Patient was advised to call us regarding when she would be going in for her appointment for family planning and she has the appointment on 5/3. She was calling because she wanted to know when she she should come in and if it should be immediately before or after she becomes pregnant and she was asking for a call back        Callback Number: [phone]      Thank you    -Ventura CHAMPION

## 2024-03-28 NOTE — TELEPHONE ENCOUNTER
Called pt - no answer. LVM explaining that she will need to change anticoagulants if she is planning to become pregnant. We would need to DC Eliquis and START Lovenox.    Will sent pt [employer] message to schedule appt to discuss further.    Db Hooks, MarileeD, BCACP

## 2024-04-08 DIAGNOSIS — I82.890 ACUTE DEEP VEIN THROMBOSIS OF PELVIC VEIN: ICD-10-CM

## 2024-04-08 DIAGNOSIS — Z79.01 CHRONIC ANTICOAGULATION: ICD-10-CM

## 2024-04-08 RX ORDER — APIXABAN 5 MG/1
5 TABLET, FILM COATED ORAL 2 TIMES DAILY
Qty: 60 TABLET | Refills: 11 | Status: SHIPPED | OUTPATIENT
Start: 2024-04-08

## 2024-04-09 ENCOUNTER — TELEPHONE (OUTPATIENT)
Dept: PHARMACY | Facility: MEDICAL CENTER | Age: 34
End: 2024-04-09
Payer: COMMERCIAL

## 2024-04-09 NOTE — TELEPHONE ENCOUNTER
ELIQUIS 5 MG Tab    Received Renewal PA request via [employer]  for Eliquis. (Quantity:60, Day Supply:30)     Insurance: [facility]  Member ID:  [id number]  BIN: [id number]  PCN: [facility]  Group: [id number]     Ran Test claim via [employer] & medication Pays for a $10 copay. Will outreach to patient to offer specialty pharmacy services and or release to preferred pharmacy    $30. qty 180T/90DS

## 2024-04-12 PROCEDURE — RXMED WILLOW AMBULATORY MEDICATION CHARGE: Performed by: NURSE PRACTITIONER

## 2024-04-15 ENCOUNTER — PHARMACY VISIT (OUTPATIENT)
Dept: PHARMACY | Facility: MEDICAL CENTER | Age: 34
End: 2024-04-15
Payer: COMMERCIAL

## 2024-04-19 ENCOUNTER — HOSPITAL ENCOUNTER (OUTPATIENT)
Dept: LAB | Facility: MEDICAL CENTER | Age: 34
End: 2024-04-19
Attending: INTERNAL MEDICINE
Payer: COMMERCIAL

## 2024-04-19 DIAGNOSIS — I82.890 ACUTE DEEP VEIN THROMBOSIS OF PELVIC VEIN: ICD-10-CM

## 2024-04-19 DIAGNOSIS — Z79.01 CHRONIC ANTICOAGULATION: ICD-10-CM

## 2024-04-19 LAB
ALBUMIN SERPL BCP-MCNC: 4.4 G/DL (ref 3.2–4.9)
ALBUMIN/GLOB SERPL: 1.3 G/DL
ALP SERPL-CCNC: 101 U/L (ref 30–99)
ALT SERPL-CCNC: 14 U/L (ref 2–50)
ANION GAP SERPL CALC-SCNC: 11 MMOL/L (ref 7–16)
AST SERPL-CCNC: 13 U/L (ref 12–45)
BASOPHILS # BLD AUTO: 0.3 % (ref 0–1.8)
BASOPHILS # BLD: 0.02 K/UL (ref 0–0.12)
BILIRUB SERPL-MCNC: 0.3 MG/DL (ref 0.1–1.5)
BUN SERPL-MCNC: 9 MG/DL (ref 8–22)
CALCIUM ALBUM COR SERPL-MCNC: 9 MG/DL (ref 8.5–10.5)
CALCIUM SERPL-MCNC: 9.3 MG/DL (ref 8.5–10.5)
CHLORIDE SERPL-SCNC: 105 MMOL/L (ref 96–112)
CO2 SERPL-SCNC: 23 MMOL/L (ref 20–33)
CREAT SERPL-MCNC: 0.81 MG/DL (ref 0.5–1.4)
EOSINOPHIL # BLD AUTO: 0.1 K/UL (ref 0–0.51)
EOSINOPHIL NFR BLD: 1.5 % (ref 0–6.9)
ERYTHROCYTE [DISTWIDTH] IN BLOOD BY AUTOMATED COUNT: 40.2 FL (ref 35.9–50)
GFR SERPLBLD CREATININE-BSD FMLA CKD-EPI: 99 ML/MIN/1.73 M 2
GLOBULIN SER CALC-MCNC: 3.3 G/DL (ref 1.9–3.5)
GLUCOSE SERPL-MCNC: 85 MG/DL (ref 65–99)
HCT VFR BLD AUTO: 40.3 % (ref 37–47)
HGB BLD-MCNC: 13.5 G/DL (ref 12–16)
IMM GRANULOCYTES # BLD AUTO: 0.02 K/UL (ref 0–0.11)
IMM GRANULOCYTES NFR BLD AUTO: 0.3 % (ref 0–0.9)
LYMPHOCYTES # BLD AUTO: 2.07 K/UL (ref 1–4.8)
LYMPHOCYTES NFR BLD: 30 % (ref 22–41)
MCH RBC QN AUTO: 28.8 PG (ref 27–33)
MCHC RBC AUTO-ENTMCNC: 33.5 G/DL (ref 32.2–35.5)
MCV RBC AUTO: 85.9 FL (ref 81.4–97.8)
MONOCYTES # BLD AUTO: 0.6 K/UL (ref 0–0.85)
MONOCYTES NFR BLD AUTO: 8.7 % (ref 0–13.4)
NEUTROPHILS # BLD AUTO: 4.08 K/UL (ref 1.82–7.42)
NEUTROPHILS NFR BLD: 59.2 % (ref 44–72)
NRBC # BLD AUTO: 0 K/UL
NRBC BLD-RTO: 0 /100 WBC (ref 0–0.2)
PLATELET # BLD AUTO: 366 K/UL (ref 164–446)
PMV BLD AUTO: 9.1 FL (ref 9–12.9)
POTASSIUM SERPL-SCNC: 3.9 MMOL/L (ref 3.6–5.5)
PROT SERPL-MCNC: 7.7 G/DL (ref 6–8.2)
RBC # BLD AUTO: 4.69 M/UL (ref 4.2–5.4)
SODIUM SERPL-SCNC: 139 MMOL/L (ref 135–145)
WBC # BLD AUTO: 6.9 K/UL (ref 4.8–10.8)

## 2024-04-19 PROCEDURE — 36415 COLL VENOUS BLD VENIPUNCTURE: CPT

## 2024-04-19 PROCEDURE — 85025 COMPLETE CBC W/AUTO DIFF WBC: CPT

## 2024-04-19 PROCEDURE — 80053 COMPREHEN METABOLIC PANEL: CPT

## 2024-04-23 ENCOUNTER — OFFICE VISIT (OUTPATIENT)
Dept: MEDICAL GROUP | Facility: MEDICAL CENTER | Age: 34
End: 2024-04-23
Payer: COMMERCIAL

## 2024-04-23 VITALS
WEIGHT: 223 LBS | OXYGEN SATURATION: 98 % | HEART RATE: 87 BPM | SYSTOLIC BLOOD PRESSURE: 124 MMHG | BODY MASS INDEX: 35 KG/M2 | RESPIRATION RATE: 16 BRPM | DIASTOLIC BLOOD PRESSURE: 72 MMHG | TEMPERATURE: 99 F | HEIGHT: 67 IN

## 2024-04-23 DIAGNOSIS — L02.92 FURUNCLE: ICD-10-CM

## 2024-04-23 DIAGNOSIS — D68.51 HETEROZYGOUS FACTOR V LEIDEN MUTATION (HCC): ICD-10-CM

## 2024-04-23 PROCEDURE — 99214 OFFICE O/P EST MOD 30 MIN: CPT | Performed by: PHYSICIAN ASSISTANT

## 2024-04-23 PROCEDURE — 3074F SYST BP LT 130 MM HG: CPT | Performed by: PHYSICIAN ASSISTANT

## 2024-04-23 PROCEDURE — 3078F DIAST BP <80 MM HG: CPT | Performed by: PHYSICIAN ASSISTANT

## 2024-04-23 RX ORDER — SULFAMETHOXAZOLE AND TRIMETHOPRIM 800; 160 MG/1; MG/1
TABLET ORAL
Qty: 20 TABLET | Refills: 0 | Status: SHIPPED | OUTPATIENT
Start: 2024-04-23

## 2024-04-23 ASSESSMENT — PATIENT HEALTH QUESTIONNAIRE - PHQ9: CLINICAL INTERPRETATION OF PHQ2 SCORE: 0

## 2024-04-23 ASSESSMENT — FIBROSIS 4 INDEX: FIB4 SCORE: 0.3

## 2024-04-23 NOTE — PROGRESS NOTES
Subjective:     History of Present Illness  The patient presents for evaluation of furuncle.    The patient, a member of [employer] three times a week and works in construction, first noticed a small bump on the right side of her vaginal labia majora while showering five days ago. She has a history of developing small cysts, which are typically self-resolving with hot compresses or waiting until a head is expelled. However, the current bump has increased in size and is associated with pain. On Friday, she began experiencing groin pain, which she initially attributed to overstretching. Despite the application of hot compresses twice daily, the bump has not improved. She denies any purulent discharge. The size of the bump fluctuates, with the most recent episode causing discomfort around 10:30 PM. Upon examination using a mirror, the bump appeared to have elongated and resembled a [place]. The perivaginal region is swollen and painful, similar to her previous experience with a blood clot a few years ago. The bump is described as tender, sore, and harder. She is seeking medication to prevent a yeast infection.    The patient has a scheduled appointment with her cardiologist and vascular doctor this Friday to transition her to Lovenox due to family planning. Additionally, she has an appointment with her OB/GYN for an annual Pap smear and IUD removal.      Current medicines (including changes today)  Current Outpatient Medications   Medication Sig Dispense Refill    sulfamethoxazole-trimethoprim (BACTRIM DS) 800-160 MG tablet Take one pill twice a day for ten days 20 Tablet 0    ELIQUIS 5 MG Tab Take 1 Tablet by mouth 2 times a day. 60 Tablet 11    Levonorgestrel (MIRENA, 52 MG, IU) 1 Each by Intrauterine route see administration instructions. Every 5 years       No current facility-administered medications for this visit.     She  has a past medical history of Anxiety, Obstetric blood-clot embolism, and Scoliosis.    ROS  "  No chest pain, no shortness of breath, no abdominal pain  Positive ROS as per HPI.  All other systems reviewed and are negative.     Objective:     /72 (BP Location: Left arm, Patient Position: Sitting)   Pulse 87   Temp 37.2 °C (99 °F) (Temporal)   Resp 16   Ht 1.702 m (5' 7\")   Wt 101 kg (223 lb)   SpO2 98%  Body mass index is 34.93 kg/m².   Physical Exam  The right labia majora has a nonfluctuant mass with some right-sided inguinal lymphadenopathy. There is no comedone or significant area of fluctuant to drain.  Constitutional: Alert, no distress.  Skin: Warm, dry, good turgor,   Eye: Equal, round and reactive, conjunctiva clear, lids normal.  ENMT: Lips without lesions, good dentition, oropharynx clear.  Neck: Trachea midline, no masses, no thyromegaly. No cervical or supraclavicular lymphadenopathy  Respiratory: Unlabored respiratory effort, lungs clear to auscultation, no wheezes, no ronchi.  Cardiovascular: Normal S1, S2, no murmur, no edema.  Abdomen: Soft, non-tender, no masses, no hepatosplenomegaly.  Psych: Alert and oriented x3, normal affect and mood.      Results          Assessment and Plan:   The following treatment plan was discussed    Assessment & Plan  1. Furuncle.  The patient presents with a furuncle located in the right labia majora region. The condition is not yet ready for incise and drain. The patient is advised to apply warm compresses thrice daily. A prescription for Bactrim double strength has been issued for the patient to take for the next 10 days. It is recommended that the patient seek follow-up care if the symptoms worsen. It is noteworthy that the patient has an annual Papanicolaou test with her OB/GYN in 9 days, which should be discussed with them as well.    2. History of blood clots (DVT)  This is a chronic condition that necessitates chronic anticoagulation. The patient has an appointment with vascular medicine in 1 week, during which they will transition her to " Lovenox as she is attempting to initiate family planning.    Follow-up  The patient is instructed to follow up with us within the next 1 to 2 weeks if the symptoms are worsening or as needed.      ORDERS:  There are no diagnoses linked to this encounter.  Shantel was seen today for bump.    Diagnoses and all orders for this visit:    Heterozygous factor V Leiden mutation (HCC)    Furuncle  -     sulfamethoxazole-trimethoprim (BACTRIM DS) 800-160 MG tablet; Take one pill twice a day for ten days          Please note that this dictation was created using voice recognition software. I have made every reasonable attempt to correct obvious errors, but I expect that there are errors of grammar and possibly content that I did not discover before finalizing the note.      Attestation      Verbal consent was acquired by the patient to use [employer] ambient listening note generation during this visit Yes

## 2024-04-26 ENCOUNTER — ANTICOAGULATION VISIT (OUTPATIENT)
Dept: VASCULAR LAB | Facility: MEDICAL CENTER | Age: 34
End: 2024-04-26
Attending: INTERNAL MEDICINE
Payer: COMMERCIAL

## 2024-04-26 DIAGNOSIS — D68.51 HETEROZYGOUS FACTOR V LEIDEN MUTATION (HCC): ICD-10-CM

## 2024-04-26 DIAGNOSIS — I82.890 ACUTE DEEP VEIN THROMBOSIS OF PELVIC VEIN: ICD-10-CM

## 2024-04-26 PROCEDURE — 99212 OFFICE O/P EST SF 10 MIN: CPT

## 2024-04-26 RX ORDER — ENOXAPARIN SODIUM 150 MG/ML
1.5 INJECTION SUBCUTANEOUS DAILY
Qty: 30 EACH | Refills: 3 | Status: SHIPPED | OUTPATIENT
Start: 2024-04-26

## 2024-04-26 NOTE — PROGRESS NOTES
Target end date:Indefinite     Indication: Factor V Leiden      Drug: Eliquis 5 mg BID     CHADsVASC = N/A    Health Status Since Last Assessment   Patient denies any new relevant medical problems, ED visits or hospitalizations   Patient denies any embolic events (stroke/tia/systemic embolism)    Adherence with DOAC Therapy   Pt has occasionally misses doses when patient plans on drinking and will take 1 of 2/day. Patient also states has been occasionally missed due to issues with pharmacy refills.     Bleeding Risk Assessment     Denies Epistaxis   Pt denies any excessive or unusual bleeding/hematomas.  Pt denies any GI bleeds or hematemesis.  Pt denies any concerning daily headache or sub dural hematoma symptoms.     Pt denies any hematuria    Latest Hemoglobin    Latest Reference Range & Units Most Recent   Hemoglobin 12.0 - 16.0 g/dL 13.5  4/19/24 12:55      ETOH overuse 3-5 drinks/month > will be decreasing now with family planning      Creatinine Clearance/Renal Function     Latest ClCr 156.98 ml/min    Hepatic function   Latest LFTs    Latest Reference Range & Units Most Recent   AST(SGOT) 12 - 45 U/L 13  4/19/24 12:55   ALT(SGPT) 2 - 50 U/L 14  4/19/24 12:55      Pt denies any history of liver dysfunction      Drug Interactions   Platelets:    Latest Reference Range & Units Most Recent   Platelet Count 164 - 446 K/uL 366  4/19/24 12:55      ASA/other antiplatelets    Latest Reference Range & Units Most Recent   Platelet Count 164 - 446 K/uL 366  4/19/24 12:55      NSAID None   Other drug interactions None   Verified no anticonvulsant or azole therapy, education provided for future use.     Examination   Blood Pressure 113/75   Symptomatic hypotension None   Significant gait impairment/imbalance/high risk for falls? None    Final Assessment and Recommendations:   Patient appears stable from the anticoagulation standpoint.     Benefits of continued DOAC therapy outweigh risks for this patient    Will be  transitioning to Enoxaparin for family planning. Will start with 1.5 mg/kg daily injections = 150 mg. Patient is to stop Eliquis once patient receives Enoxaparin.     Xa level ordered. Patient is aware to receive labs  ~ 4 hours post injection.     Other Actions: cmp/ cbc hemogram ordered prior to next visit    Follow up:   Will follow up with patient 3 weeks     Fabiana Gonzalez, PharmD

## 2024-04-29 PROCEDURE — RXMED WILLOW AMBULATORY MEDICATION CHARGE

## 2024-05-01 ENCOUNTER — PHARMACY VISIT (OUTPATIENT)
Dept: PHARMACY | Facility: MEDICAL CENTER | Age: 34
End: 2024-05-01
Payer: COMMERCIAL

## 2024-05-16 ENCOUNTER — APPOINTMENT (OUTPATIENT)
Dept: VASCULAR LAB | Facility: MEDICAL CENTER | Age: 34
End: 2024-05-16
Payer: COMMERCIAL

## 2024-05-21 ENCOUNTER — PATIENT MESSAGE (OUTPATIENT)
Dept: VASCULAR LAB | Facility: MEDICAL CENTER | Age: 34
End: 2024-05-21
Payer: COMMERCIAL

## 2024-05-21 DIAGNOSIS — D68.51 HETEROZYGOUS FACTOR V LEIDEN MUTATION (HCC): ICD-10-CM

## 2024-05-21 DIAGNOSIS — I82.890 ACUTE DEEP VEIN THROMBOSIS OF PELVIC VEIN: ICD-10-CM

## 2024-05-21 NOTE — PROGRESS NOTES
Received call from patient. She woke up this morning with bruised and hardened lump with pain and is concerned for hematoma. She confirms that she has been switching injection sites and denies rubbing the site after injection.    She was switched from Eliquis to Lovenox 04/26/24 as she is planning to get pregnant. She denies any confirmation of pregnancy.    Because of her concern for hematoma, will have pt hold Lovenox for now and switch back to Eliquis 5mg bid.    She will get anti-Xa drawn tomorrow just so we can monitor her current level, as well as a CBC.    Nancy Sanchez, PharmD    CC Josh Son, PharmD

## 2024-05-22 ENCOUNTER — TELEPHONE (OUTPATIENT)
Dept: VASCULAR LAB | Facility: MEDICAL CENTER | Age: 34
End: 2024-05-22
Payer: COMMERCIAL

## 2024-05-22 ENCOUNTER — HOSPITAL ENCOUNTER (OUTPATIENT)
Dept: LAB | Facility: MEDICAL CENTER | Age: 34
End: 2024-05-22
Payer: COMMERCIAL

## 2024-05-22 DIAGNOSIS — D68.51 HETEROZYGOUS FACTOR V LEIDEN MUTATION (HCC): ICD-10-CM

## 2024-05-22 DIAGNOSIS — I82.890 ACUTE DEEP VEIN THROMBOSIS OF PELVIC VEIN: ICD-10-CM

## 2024-05-22 LAB
ERYTHROCYTE [DISTWIDTH] IN BLOOD BY AUTOMATED COUNT: 40.8 FL (ref 35.9–50)
HCT VFR BLD AUTO: 42.9 % (ref 37–47)
HGB BLD-MCNC: 14.5 G/DL (ref 12–16)
LMWH PPP CHRO-ACNC: 1.9 U/ML
MCH RBC QN AUTO: 29.5 PG (ref 27–33)
MCHC RBC AUTO-ENTMCNC: 33.8 G/DL (ref 32.2–35.5)
MCV RBC AUTO: 87.4 FL (ref 81.4–97.8)
PLATELET # BLD AUTO: 394 K/UL (ref 164–446)
PMV BLD AUTO: 9.3 FL (ref 9–12.9)
RBC # BLD AUTO: 4.91 M/UL (ref 4.2–5.4)
WBC # BLD AUTO: 8 K/UL (ref 4.8–10.8)

## 2024-05-22 NOTE — TELEPHONE ENCOUNTER
See [employer] message thread 05/21/24    Pt had Anti-Xa and CBC drawn however results have not been finalized.  S/w pt, she states her hematoma has stopped increasing. About half of her bruise consists of the hardened lump. She states it is still painful.    We had shared decision making to stay on Eliquis 5mg BID until she gets a positive pregnancy test given her reaction to Lovenox.    If she is positive for pregnancy, she will need to use Lovenox 1mg/kg BID dosing. Pt is currently 100kg. Explained to pt she can use her 150mg syringes and expel to make each dose 100mg.    Nancy Sanchez, PharmD      Received page as on-call PharmD tonight regarding patient's Anti Xa level results. It is was reported as being elevated at 1.9 U/mL from [facility] tonight. Patient was not called at the late hour as she is no longer on Lovenox injections at this time, but will follow up with the patient again in the morning to ensure she is not experiencing any further bleeding concerns at this time.     Maria Elena Mosley  PharmD

## 2024-05-23 NOTE — TELEPHONE ENCOUNTER
Latest Reference Range & Units 05/22/24 12:47   WBC 4.8 - 10.8 K/uL 8.0   RBC 4.20 - 5.40 M/uL 4.91   Hemoglobin 12.0 - 16.0 g/dL 14.5   Hematocrit 37.0 - 47.0 % 42.9   MCV 81.4 - 97.8 fL 87.4   MCH 27.0 - 33.0 pg 29.5   MCHC 32.2 - 35.5 g/dL 33.8   RDW 35.9 - 50.0 fL 40.8   Platelet Count 164 - 446 K/uL 394   MPV 9.0 - 12.9 fL 9.3      Latest Reference Range & Units 05/22/24 12:47   Heparin Xa (LMWH) U/mL 1.9 (HH)   (HH): Data is critically high    Attempted to call pt regarding lab results above. LVM asking for her to call me back to discuss and assess hematoma s/s.    CBC is wnl, anti Xa is elevated at 1.9.     Nancy Sanchez, MarileeD

## 2024-05-23 NOTE — TELEPHONE ENCOUNTER
Pt returned my call.  We discussed her labs. CBC is wnl, therefore there is no concern for hidden bleed.  Anti Xa is elevated at 1.9. This was drawn 36 hrs OFF Lovenox + 5 hrs post Eliquis dose. It may have been higher when she was on Lovenox therapy.    She states her hematoma has remained unchanged.    Will have pt continue with Eliquis 5mg bid.  Will switch to Lovenox 1mg/kg bid upon positive pregnancy test.    F/u in clinic 05/31    Nancy Sanchez, MarileeD

## 2024-05-23 NOTE — TELEPHONE ENCOUNTER
Caller: Cassie See      Topic/issue: Patient was returning our call and had some addition questions about her labs and she was asking for a call back        Callback Number: [phone]      Thank you    -Ventura CHAMPION

## 2024-05-24 ENCOUNTER — APPOINTMENT (OUTPATIENT)
Dept: LAB | Facility: MEDICAL CENTER | Age: 34
End: 2024-05-24
Payer: COMMERCIAL

## 2024-05-30 ENCOUNTER — APPOINTMENT (OUTPATIENT)
Dept: VASCULAR LAB | Facility: MEDICAL CENTER | Age: 34
End: 2024-05-30
Payer: COMMERCIAL

## 2024-05-31 ENCOUNTER — ANTICOAGULATION VISIT (OUTPATIENT)
Dept: VASCULAR LAB | Facility: MEDICAL CENTER | Age: 34
End: 2024-05-31
Attending: INTERNAL MEDICINE
Payer: COMMERCIAL

## 2024-05-31 DIAGNOSIS — I82.890 ACUTE DEEP VEIN THROMBOSIS OF PELVIC VEIN: ICD-10-CM

## 2024-05-31 NOTE — PROGRESS NOTES
Target end date:Indefinite                                Indication: Factor V Leiden                                 Drug: Eliquis 5 mg BID                                CHADsVASC = N/A    Health Status Since Last Assessment   Patient denies any new relevant medical problems, ED visits or hospitalizations   Patient denies any embolic events (stroke/tia/systemic embolism)    Adherence with DOAC Therapy   Pt has one missed any doses since restarting Eliquis on 5/23.    Bleeding Risk Assessment     Denies Epistaxis   Pt denies any excessive or unusual bleeding/hematomas.  Pt denies any GI bleeds or hematemesis.  Pt denies any concerning daily headache or sub dural hematoma symptoms.     Pt denies any hematuria    Latest Hemoglobin    Latest Reference Range & Units 05/22/24 12:47   Hemoglobin 12.0 - 16.0 g/dL 14.5      ETOH overuse denies     Creatinine Clearance/Renal Function     Latest ClCr 156   Latest Reference Range & Units 04/19/24 12:55   Creatinine 0.50 - 1.40 mg/dL 0.81   GFR (CKD-EPI) >60 mL/min/1.73 m 2 99       Hepatic function   Latest LFTs    Pt denies any history of liver dysfunction    Latest Reference Range & Units 04/19/24 12:55   AST(SGOT) 12 - 45 U/L 13   ALT(SGPT) 2 - 50 U/L 14        Drug Interactions   Platelets:    Latest Reference Range & Units 05/22/24 12:47   Platelet Count 164 - 446 K/uL 394      ASA/other antiplatelets denies   NSAID denies    Verified no anticonvulsant or azole therapy, education provided for future use.     Examination   Blood Pressure 112/74 with pulse 69   Symptomatic hypotension denies   Significant gait impairment/imbalance/high risk for falls? no    Final Assessment and Recommendations:   Patient appears stable from the anticoagulation standpoint.     Benefits of continued DOAC therapy outweigh risks for this patient   Recommend pt continue with current DOAC therapy with eliquis for now. Will follow up with Dr. Pompa as patient is concerned with  conceiving while on Eliquis.    DOAC is affordable     Other Actions: cmp/ cbc hemogram ordered prior to next visit    Follow up:   Will follow up with patient 6 months unless she has confirmed pregnancy.    Annabelle Haider, Pharmacy Intern    Maria Elena HuntD

## 2024-06-02 PROCEDURE — RXMED WILLOW AMBULATORY MEDICATION CHARGE: Performed by: NURSE PRACTITIONER

## 2024-06-03 ENCOUNTER — TELEPHONE (OUTPATIENT)
Dept: VASCULAR LAB | Facility: MEDICAL CENTER | Age: 34
End: 2024-06-03
Payer: COMMERCIAL

## 2024-06-03 PROBLEM — D68.51 FACTOR V LEIDEN (HCC): Status: ACTIVE | Noted: 2024-06-03

## 2024-06-03 NOTE — TELEPHONE ENCOUNTER
Phone Number Called: [phone] (home) [phone] (work)      Call outcome: Spoke to patient regarding message below.    Message: started Lovenox but has a hard lump bruising has gone away a bit is wondering how long should she wait to see either us or PCP. Has concerns about taking Eliquis while also trying to become pregnant. Would like to know what our providers think they should do before scheduling an appointment.    Angela Mar, Medical Ass't  [facility]   Phone: [phone]   Fax: [phone]

## 2024-06-04 ENCOUNTER — PHARMACY VISIT (OUTPATIENT)
Dept: PHARMACY | Facility: MEDICAL CENTER | Age: 34
End: 2024-06-04
Payer: COMMERCIAL

## 2024-06-06 ENCOUNTER — OFFICE VISIT (OUTPATIENT)
Dept: VASCULAR LAB | Facility: MEDICAL CENTER | Age: 34
End: 2024-06-06
Attending: FAMILY MEDICINE
Payer: COMMERCIAL

## 2024-06-06 VITALS
SYSTOLIC BLOOD PRESSURE: 107 MMHG | HEART RATE: 80 BPM | DIASTOLIC BLOOD PRESSURE: 73 MMHG | BODY MASS INDEX: 34.21 KG/M2 | HEIGHT: 67 IN | WEIGHT: 218 LBS

## 2024-06-06 DIAGNOSIS — D68.51 HETEROZYGOUS FACTOR V LEIDEN MUTATION (HCC): ICD-10-CM

## 2024-06-06 DIAGNOSIS — Z30.09 FAMILY PLANNING COUNSELING: ICD-10-CM

## 2024-06-06 DIAGNOSIS — I82.890 ACUTE DEEP VEIN THROMBOSIS OF PELVIC VEIN: ICD-10-CM

## 2024-06-06 DIAGNOSIS — Z79.01 CHRONIC ANTICOAGULATION: ICD-10-CM

## 2024-06-06 PROCEDURE — 3078F DIAST BP <80 MM HG: CPT | Performed by: FAMILY MEDICINE

## 2024-06-06 PROCEDURE — 3074F SYST BP LT 130 MM HG: CPT | Performed by: FAMILY MEDICINE

## 2024-06-06 PROCEDURE — 99212 OFFICE O/P EST SF 10 MIN: CPT

## 2024-06-06 PROCEDURE — 99214 OFFICE O/P EST MOD 30 MIN: CPT | Performed by: FAMILY MEDICINE

## 2024-06-06 RX ORDER — ENOXAPARIN SODIUM 100 MG/ML
40 INJECTION SUBCUTANEOUS DAILY
Qty: 30 EACH | Refills: 11 | Status: SHIPPED | OUTPATIENT
Start: 2024-06-06

## 2024-06-06 ASSESSMENT — ENCOUNTER SYMPTOMS
SPUTUM PRODUCTION: 0
BLOOD IN STOOL: 0
PND: 0
SHORTNESS OF BREATH: 0
ORTHOPNEA: 0
CHILLS: 0
PALPITATIONS: 0
FEVER: 0
CLAUDICATION: 0
WHEEZING: 0
HEMOPTYSIS: 0
COUGH: 0
BRUISES/BLEEDS EASILY: 0

## 2024-06-06 ASSESSMENT — FIBROSIS 4 INDEX: FIB4 SCORE: 0.28

## 2024-06-06 NOTE — PROGRESS NOTES
FOLLOW-UP VASCULAR ANTICOAGULATION VISIT  06/06/24   Cassie Powers is a female who presents for f/u   Initially referred by Anita Domínguez P.A.-C. for length of therapy (LOT) determination and management of anticoagulation in context of acute venothromboembolic disease    Subjective    Interval hx/concerns: last seen 10/2021, ongoing care with  clinic for monitoring, currently consider conception and wondering about options for reducing VTE risks.  Known hetero FVL+ and using eliquis.    After using lovenox has hematoma at injection sites.  After 12th injection occurred.  Has improved since off eliquis.     VTE disease / Anticoagulation:   Current agent: eliquis 5mg BID - good adherence, had prior hematoma - now better   Date of initiation of anticoagulation: 6/23/21   Pertinent VTE pmhx:   Date of Diagnosis: 6/23/21   Type of Venous thromboembolic disease (VTE): acute R pelvic vein DVT   Preceding/presenting symptoms: R lower pelvic pain, acute.  Has Mirena IUD placed >1yr ago.    Antithrombotic therapy at time of VTE event: no  VTE tx course: Eliquis 10mg BID x 7d, now eliquis 5mg BID   PROVOKING FACTORS:  Any personal VTE hx? No  Any family VTE hx? No  Recent surgery ? No  Recent trauma ? No  Smoker?  reports that she has never smoked. She has never used smokeless tobacco.    Extended travel? No  Other periods of immobility? No  Other known or potential risk factors for VTE disease:  no  WOMEN: Hx of cancer or abnormal cancer screenings: no       Any estrogen, testosterone HRT, E2 birth control, tamoxifene, raloxifene: Mirena IUD       Hx of recurrent miscarriages: no  Hypercoaguability work-up completed?  YES - HETERO FVL+     Social History     Tobacco Use    Smoking status: Never    Smokeless tobacco: Never   Vaping Use    Vaping status: Never Used   Substance Use Topics    Alcohol use: Yes     Comment: occ     Drug use: Never     DIET AND EXERCISE:  Weight Change:stable   diet: common  "adult  Exercise: minimal exercise     Review of Systems   Constitutional:  Negative for chills, fever and malaise/fatigue.   HENT:  Negative for nosebleeds.    Respiratory:  Negative for cough, hemoptysis, sputum production, shortness of breath and wheezing.    Cardiovascular:  Negative for chest pain, palpitations, orthopnea, claudication, leg swelling and PND.   Gastrointestinal:  Negative for blood in stool.   Endo/Heme/Allergies:  Does not bruise/bleed easily.        Objective   Vitals:    06/06/24 1416   BP: 107/73   BP Location: Left arm   Patient Position: Sitting   BP Cuff Size: Large adult   Pulse: 80   Weight: 98.9 kg (218 lb)   Height: 1.702 m (5' 7\")      BP Readings from Last 5 Encounters:   06/06/24 107/73   04/23/24 124/72   12/28/23 100/70   07/14/23 107/73   10/27/22 102/68      Wt Readings from Last 3 Encounters:   06/06/24 98.9 kg (218 lb)   04/23/24 101 kg (223 lb)   12/28/23 97.5 kg (215 lb)      Body mass index is 34.14 kg/m².  Physical Exam  Vitals reviewed.   Constitutional:       General: She is not in acute distress.     Appearance: Normal appearance.   HENT:      Head: Normocephalic and atraumatic.   Neck:      Thyroid: No thyroid mass.      Vascular: Normal carotid pulses.      Trachea: Trachea normal.   Cardiovascular:      Rate and Rhythm: Normal rate and regular rhythm.      Chest Wall: PMI is not displaced.      Pulses: Normal pulses.           Carotid pulses are 2+ on the right side and 2+ on the left side.       Radial pulses are 2+ on the right side and 2+ on the left side.        Dorsalis pedis pulses are 2+ on the right side and 2+ on the left side.        Posterior tibial pulses are 2+ on the right side and 2+ on the left side.      Heart sounds: Normal heart sounds.   Pulmonary:      Effort: Pulmonary effort is normal.      Breath sounds: Normal breath sounds.   Musculoskeletal:      Cervical back: Full passive range of motion without pain.      Right lower leg: No edema.      " Left lower leg: No edema.   Skin:     General: Skin is warm and dry.      Capillary Refill: Capillary refill takes less than 2 seconds.      Coloration: Skin is not cyanotic.      Nails: There is no clubbing.   Neurological:      General: No focal deficit present.      Mental Status: She is alert and oriented to person, place, and time. Mental status is at baseline.      Cranial Nerves: No cranial nerve deficit.      Coordination: Coordination is intact. Coordination normal.      Gait: Gait is intact. Gait normal.   Psychiatric:         Mood and Affect: Mood normal.         Behavior: Behavior normal.                  Lab Results   Component Value Date    SODIUM 139 04/19/2024    POTASSIUM 3.9 04/19/2024    CHLORIDE 105 04/19/2024    CO2 23 04/19/2024    GLUCOSE 85 04/19/2024    BUN 9 04/19/2024    CREATININE 0.81 04/19/2024        Lab Results   Component Value Date    WBC 8.0 05/22/2024    RBC 4.91 05/22/2024    HEMOGLOBIN 14.5 05/22/2024    HEMATOCRIT 42.9 05/22/2024    MCV 87.4 05/22/2024    MCH 29.5 05/22/2024    MCHC 33.8 05/22/2024    MPV 9.3 05/22/2024         Ref. Range 9/29/2021 11:02   Anti-Cardiolipin Ab Iga Latest Ref Range: 0 - 11 APL <10   Anti-Cardiolipin Ab Igm Latest Ref Range: 0 - 12 MPL 13 (H)   Anti-Cariolipin Ab Igg Latest Ref Range: 0 - 14 GPL <10   Beta-2 Glycoprotein I Ab Igg Latest Ref Range: 0 - 20 SGU <10   Beta-2 Glycoprotein I Iga Latest Ref Range: 0 - 20 EMELIA <10   Beta-2 Glycoprotein I Igm Latest Ref Range: 0 - 20 SMU <10      Ref. Range 9/29/2021 11:02   Factor Ii Dna Analysis Pt Gene Unknown Negative   V Leiden Factor Unknown Heterozygous (A)       VASCULAR IMAGING:     CT abd/pelvis 6/23/21   1.  Right ovarian vein thrombophlebitis and thrombosis of right internal iliac vein. There are additional prominent dilated tubular structures in the pelvis on the right which probably represent dilated thrombosed veins with adjacent fat stranding   possibly related to edema. An underlying  inflammatory process in the right adnexa cannot be excluded. Consider further characterization with pelvic ultrasound. Gynecology consultation is suggested.  2.  Mild right-sided urinary bladder wall thickening which could be reactive however correlate with urinalysis.  3.  IUD is well positioned.  4.  Normal appendix.    US pelvis 6/23/21   1.  Complex right adnexal tubular structure of uncertain etiology, possibly dilated fallopian tube with irregular contents versus partially thrombosed vessels/vein.  2.  Right ovary contains multiple small follicles. Blood flow could not be obtained within the right ovary which is indeterminate and possibly technical versus ovarian torsion. The ovaries not particularly enlarged.  3.  The left ovary is not visualized.  4.  Gynecology consultation is suggested.    US pelvis 9/29/21   Normal transvaginal appearance of the pelvis.  No evidence of thrombosed vascular structure about the right ovary.          Medical Decision Making:  Today's Assessment / Status / Plan:     1. Acute deep vein thrombosis of pelvic vein        2. Heterozygous factor V Leiden mutation (HCC)  enoxaparin (LOVENOX) 40 MG/0.4ML Solution Prefilled Syringe inj      3. Chronic anticoagulation        4. Family planning counseling          PATIENT TYPE: Primary Prevention    Etiology of Established CVD if Present:     1) R pelvic pain venous thrombosis, 6/2021 , heterozygous FVL+   - resolved on repeat pelvis US 9/2021   plan  - counseled on signs and symptoms of acute VTE that require seeking prompt attention in the ED to include shortness of breath, chest pain, pain with deep inhalation, acute leg swelling and/or pain in calf or leg, and worsening pelvic pain  - elevate legs as much as possible, use compression stockings/socks if directed by your provider  - Avoid hormonal therapies including estrogen or testosterone-containing meds, or raloxifene or tamoxifene (commonly used for osteoporosis)  - Avoid  sedentary periods  - continue complete avoidance of tobacco products  - if having any invasive procedure,please make sure the doctor knows of your history of blood clots and current anticoagulation status  - avoid Aspirin and anti-inflammatories (eg. Advil, ibuprofen, Aleve, naproxen, etc) while anticoagulated   - avoid dangerous activities to reduce risk of head injury and brain bleeds    ANTITHROMBOTIC THERAPY:  Date of initiation: 6/23/21   HAS-BLED bleeding risk calc ([email or web address]): 0 pts, 0.9%, low risk   Thrombophilia/hypercoag evaluation:  Hetero FVL+   Factors to consider for indefinite OAC: VTE at atypical site (mesenteric, portal, cerebral vv)  Last CBC, BMP: reviewed above   Expected duration: has completed initial phase of treatment for pelvic V DVT, have reviewed FVL+ and through shared MDM we have opted to continue indefinite full-dose eliquis.  Reviewed that reduced dose not studied in thrombophilia   -Though FVL provides a 2-3 fold increased RR of 1st episode of VTE, it is generally in the context of a secondary provoking factor.    - Though presence of hetero FVL+ is not an absolute indication of lifelong OAC, it does strengthen the case of it in certain patients.  - since she had one isolated VTE event and currently stable but had hematoma with treatment dose of lovenox, will try to maintain prevention of VTE during conception/pregnancy and reduce volume of med by using preventive dosing   - current wt 98kg, so will use lovenox 40mg daily and only require 0.4mL injection volume  - should VTE event occur, will need adjustment back to treatment dose   - Highlow trial showed intermediate dosed lovenox did not reduce VTE risk compared to low dose indicated that likely fixed low dose lovenox is reasonable in women with hx of prior VTE event during pregnancy   PLAN  - continue eliquis 5mg BID for now  - once lovenox 40mg approved, we will transition to this for ongoing care during conception through  completion of lactation   - if ongoing issues with lovenox, may consider UFH vs dalteprin as acceptable alternatives   - stressed strict adherence to tx   - if any bleeding lasting 30min without stopping, please seek care with your PCP, urgent care, or ED  - reversal agents for most blood thinners are now available and used if you have major bleeding    LIPID MANAGEMENT:   Qualifies for Statin Therapy Based on 2018 ACC/AHA Guidelines: no  The ASCVD Risk score (Omer DK, et al., 2019) failed to calculate.   Major ASCVD events: None  High-risk conditions: None   Risk-enhancers: N/A  Currently on Statin: No  Treatment goals: LDL-C <100 (consider non-HDL-C <130, apoB <90)  At goal? N/A  Plan:   - reinforced ongoing TLC measures as noted   - defer lab surveillance to PCP   Meds: none at this time      BLOOD PRESSURE MANAGEMENT:  ACC/AHA (2017) goal <130/80  Home BP at goal: yes  Office BP at goal:  yes   Plan:   - continue healthy diet, activity, weight mgmt   - routine clinic-based BP measurements at least once annually   Medications: no meds indicated at this time       GLYCEMIC STATUS:  Normal    LIFESTYLE INTERVENTIONS  TOBACCO: continued complete avoidance of all tobacco products   PHYSICAL ACTIVITY: >150min/week of mod-intensity activity or as much as tolerated  NUTRITION: Mediterranean   ETOH: limit to 1 or less standard drinks/day  WT MGMT: maintain healthy weight    OTHER:    # family planning  PLAN  - avoidance of E2-containing modalities in the future  - reviewed preconception and DOAC mgmt counseling as noted above   - establish with high risk OB after conception     Studies to Be Obtained: NONE   Labs to Be Obtained: as noted above     Follow up in: 6 weeks with me    Josh Pompa M.D.  [facility]   [facility]   [phone]

## 2024-06-10 PROCEDURE — RXMED WILLOW AMBULATORY MEDICATION CHARGE: Performed by: FAMILY MEDICINE

## 2024-06-12 ENCOUNTER — PHARMACY VISIT (OUTPATIENT)
Dept: PHARMACY | Facility: MEDICAL CENTER | Age: 34
End: 2024-06-12
Payer: COMMERCIAL

## 2024-07-06 PROCEDURE — RXMED WILLOW AMBULATORY MEDICATION CHARGE: Performed by: FAMILY MEDICINE

## 2024-07-08 ENCOUNTER — TELEPHONE (OUTPATIENT)
Dept: VASCULAR LAB | Facility: MEDICAL CENTER | Age: 34
End: 2024-07-08
Payer: COMMERCIAL

## 2024-07-08 DIAGNOSIS — Z79.01 CHRONIC ANTICOAGULATION: ICD-10-CM

## 2024-07-08 DIAGNOSIS — I82.890 ACUTE DEEP VEIN THROMBOSIS OF PELVIC VEIN: ICD-10-CM

## 2024-07-10 ENCOUNTER — PHARMACY VISIT (OUTPATIENT)
Dept: PHARMACY | Facility: MEDICAL CENTER | Age: 34
End: 2024-07-10
Payer: COMMERCIAL

## 2024-07-18 ENCOUNTER — OFFICE VISIT (OUTPATIENT)
Dept: VASCULAR LAB | Facility: MEDICAL CENTER | Age: 34
End: 2024-07-18
Attending: FAMILY MEDICINE
Payer: COMMERCIAL

## 2024-07-18 VITALS
SYSTOLIC BLOOD PRESSURE: 116 MMHG | BODY MASS INDEX: 34.37 KG/M2 | DIASTOLIC BLOOD PRESSURE: 74 MMHG | HEIGHT: 67 IN | HEART RATE: 76 BPM | WEIGHT: 219 LBS

## 2024-07-18 DIAGNOSIS — Z79.01 CHRONIC ANTICOAGULATION: ICD-10-CM

## 2024-07-18 DIAGNOSIS — D68.51 HETEROZYGOUS FACTOR V LEIDEN MUTATION (HCC): ICD-10-CM

## 2024-07-18 DIAGNOSIS — I82.890 ACUTE DEEP VEIN THROMBOSIS OF PELVIC VEIN: ICD-10-CM

## 2024-07-18 DIAGNOSIS — Z31.69 PRE-CONCEPTION COUNSELING: ICD-10-CM

## 2024-07-18 PROCEDURE — 99212 OFFICE O/P EST SF 10 MIN: CPT

## 2024-07-18 PROCEDURE — 99213 OFFICE O/P EST LOW 20 MIN: CPT | Performed by: FAMILY MEDICINE

## 2024-07-18 PROCEDURE — 3074F SYST BP LT 130 MM HG: CPT | Performed by: FAMILY MEDICINE

## 2024-07-18 PROCEDURE — 3078F DIAST BP <80 MM HG: CPT | Performed by: FAMILY MEDICINE

## 2024-07-18 ASSESSMENT — FIBROSIS 4 INDEX: FIB4 SCORE: 0.28

## 2024-07-18 ASSESSMENT — ENCOUNTER SYMPTOMS
COUGH: 0
SPUTUM PRODUCTION: 0
SHORTNESS OF BREATH: 0
HEMOPTYSIS: 0
ORTHOPNEA: 0
PND: 0
BLOOD IN STOOL: 0
BRUISES/BLEEDS EASILY: 1
PALPITATIONS: 0
CLAUDICATION: 0
FEVER: 0
WHEEZING: 0
CHILLS: 0

## 2024-08-23 PROCEDURE — RXMED WILLOW AMBULATORY MEDICATION CHARGE: Performed by: FAMILY MEDICINE

## 2024-08-27 ENCOUNTER — PHARMACY VISIT (OUTPATIENT)
Dept: PHARMACY | Facility: MEDICAL CENTER | Age: 34
End: 2024-08-27
Payer: COMMERCIAL

## 2024-10-14 PROCEDURE — RXMED WILLOW AMBULATORY MEDICATION CHARGE: Performed by: FAMILY MEDICINE

## 2024-10-15 ENCOUNTER — TELEPHONE (OUTPATIENT)
Dept: MEDICAL GROUP | Facility: MEDICAL CENTER | Age: 34
End: 2024-10-15
Payer: COMMERCIAL

## 2024-10-17 ENCOUNTER — PHARMACY VISIT (OUTPATIENT)
Dept: PHARMACY | Facility: MEDICAL CENTER | Age: 34
End: 2024-10-17
Payer: COMMERCIAL

## 2024-10-21 ENCOUNTER — APPOINTMENT (OUTPATIENT)
Dept: MEDICAL GROUP | Facility: MEDICAL CENTER | Age: 34
End: 2024-10-21
Payer: COMMERCIAL

## 2024-12-05 ENCOUNTER — APPOINTMENT (OUTPATIENT)
Dept: MEDICAL GROUP | Facility: MEDICAL CENTER | Age: 34
End: 2024-12-05
Payer: COMMERCIAL

## 2024-12-05 ENCOUNTER — PHARMACY VISIT (OUTPATIENT)
Dept: PHARMACY | Facility: MEDICAL CENTER | Age: 34
End: 2024-12-05
Payer: COMMERCIAL

## 2024-12-05 VITALS
SYSTOLIC BLOOD PRESSURE: 124 MMHG | TEMPERATURE: 97.6 F | OXYGEN SATURATION: 95 % | HEIGHT: 67 IN | BODY MASS INDEX: 35.47 KG/M2 | HEART RATE: 99 BPM | WEIGHT: 226 LBS | DIASTOLIC BLOOD PRESSURE: 82 MMHG

## 2024-12-05 DIAGNOSIS — D68.51 HETEROZYGOUS FACTOR V LEIDEN MUTATION (HCC): ICD-10-CM

## 2024-12-05 DIAGNOSIS — Z13.220 SCREENING FOR LIPID DISORDERS: ICD-10-CM

## 2024-12-05 DIAGNOSIS — Z31.69 INFERTILITY COUNSELING: ICD-10-CM

## 2024-12-05 DIAGNOSIS — Z86.718 HISTORY OF DVT (DEEP VEIN THROMBOSIS): ICD-10-CM

## 2024-12-05 DIAGNOSIS — Z13.228 SCREENING FOR METABOLIC DISORDER: ICD-10-CM

## 2024-12-05 DIAGNOSIS — R53.83 OTHER FATIGUE: ICD-10-CM

## 2024-12-05 DIAGNOSIS — R05.1 ACUTE COUGH: ICD-10-CM

## 2024-12-05 DIAGNOSIS — E66.9 OBESITY (BMI 30-39.9): ICD-10-CM

## 2024-12-05 DIAGNOSIS — I82.890 ACUTE DEEP VEIN THROMBOSIS OF PELVIC VEIN: ICD-10-CM

## 2024-12-05 PROCEDURE — G2211 COMPLEX E/M VISIT ADD ON: HCPCS | Performed by: PHYSICIAN ASSISTANT

## 2024-12-05 PROCEDURE — 99214 OFFICE O/P EST MOD 30 MIN: CPT | Performed by: PHYSICIAN ASSISTANT

## 2024-12-05 PROCEDURE — 3079F DIAST BP 80-89 MM HG: CPT | Performed by: PHYSICIAN ASSISTANT

## 2024-12-05 PROCEDURE — 3074F SYST BP LT 130 MM HG: CPT | Performed by: PHYSICIAN ASSISTANT

## 2024-12-05 PROCEDURE — RXMED WILLOW AMBULATORY MEDICATION CHARGE: Performed by: PHYSICIAN ASSISTANT

## 2024-12-05 RX ORDER — DOXYCYCLINE 100 MG/1
100 CAPSULE ORAL 2 TIMES DAILY
COMMUNITY
Start: 2024-11-12 | End: 2024-12-05

## 2024-12-05 RX ORDER — AZITHROMYCIN 250 MG/1
TABLET, FILM COATED ORAL
Qty: 6 TABLET | Refills: 0 | Status: SHIPPED | OUTPATIENT
Start: 2024-12-05

## 2024-12-05 ASSESSMENT — FIBROSIS 4 INDEX: FIB4 SCORE: 0.28

## 2024-12-06 NOTE — PROGRESS NOTES
Subjective:     History of Present Illness  The patient presents for evaluation of multiple medical concerns.    She and her  have been attempting to conceive since May 2024. Her  has undergone tests for low testosterone and sperm count, both of which returned normal results. She is interested in having some blood tests done and has been in contact with [facility]. She has a history of a blood clot in her ovarian vein and pelvic inflammatory disease (PID), which has resulted in severe bloating. She is currently on Lovenox, having switched from Eliquis due to its potential harm to a fetus. She has been on Lovenox since before May 2024. She is seeking a referral to [facility] for fertility treatment. She has not had any testing done yet and is considering a procedure to clear any potential blockages in her fallopian tubes. She uses ovulation kits and has regular menstrual cycles. She has been trying to conceive for about 8 months and has been tracking her ovulation using two different apps, which indicate that she peaks between day 12 and 14 of her cycle.    She is also concerned about weight gain and bloating. Despite feeling fine in the morning, she experiences stomach pain and bloating by the end of the day, to the point where she appears pregnant. She has noticed that her abdominal area has become harder due to daily Lovenox injections. She maintains an active lifestyle, attending Pilates classes 3 to 4 times a week, doing a 15-minute full body workout, and walking twice a day. She also walks a mile during her lunch break at work. Despite these efforts, she feels that nothing is working. She and her  maintain a normal diet. Her thyroid was checked by her OB/GYN within the last 3 years and was found to be normal.    She has been experiencing a cough since September 16, 2024. She had a telehealth appointment with a doctor through  "[facility] in early November 2024, who prescribed doxycycline. However, she stopped taking it with 3 days left due to concerns about its safety while trying to conceive. Her cough has not improved since then.    FAMILY HISTORY  Her mother did not have trouble having babies.      Current medicines (including changes today)  Current Outpatient Medications   Medication Sig Dispense Refill    azithromycin (ZITHROMAX) 250 MG Tab Take two tablets on day one, then on tablet the following four days 6 Tablet 0    enoxaparin (LOVENOX) 40 MG/0.4ML Solution Prefilled Syringe inj Inject 40 mg under the skin every day at 6 PM. 30 Each 11     No current facility-administered medications for this visit.     She  has a past medical history of Anxiety, Obstetric blood-clot embolism, and Scoliosis.    ROS   No chest pain, no shortness of breath, no abdominal pain  Positive ROS as per HPI.  All other systems reviewed and are negative.     Objective:     /82   Pulse 99   Temp 36.4 °C (97.6 °F) (Temporal)   Ht 1.702 m (5' 7\")   Wt 103 kg (226 lb)   SpO2 95%  Body mass index is 35.4 kg/m².   Physical Exam    Constitutional: Alert, no distress.  Skin: Warm, dry, good turgor, no rashes in visible areas.  Eye: Equal, round and reactive, conjunctiva clear, lids normal.  ENMT: Lips without lesions, good dentition, oropharynx clear.  Neck: Trachea midline, no masses, no thyromegaly. No cervical or supraclavicular lymphadenopathy  Respiratory: Unlabored respiratory effort, lungs clear to auscultation, no wheezes, no ronchi.  Cardiovascular: Normal S1, S2, no murmur, no edema.  Abdomen: Soft, non-tender, no masses, no hepatosplenomegaly.  Psych: Alert and oriented x3, normal affect and mood.      Results          Assessment and Plan:   The following treatment plan was discussed    Assessment & Plan  1. Infertility.  Given her history of blood clots, her pregnancy will be managed by specialists. Her regular menstrual cycle and " maternal history of successful pregnancies are positive indicators. However, the presence of a clot in the pelvic organ region could potentially impact her fertility. A referral to [facility] has been made. She has been advised to engage in sexual activity on days 10, 11, and 12 of her cycle to increase the likelihood of conception. A procedure to ensure there is no scar tissue in the pelvic region has been recommended. Hormone levels including testosterone, estradiol, and progesterone will be checked.    2. Weight management.  Her weight gain could be attributed to hormonal imbalances. Her current exercise regimen may not be sufficient for weight loss. She has been advised to incorporate more cardio and anaerobic exercises into her routine. The use of Ozempic or Mounjaro for weight loss has been suggested, but she has declined due to cost considerations. A referral to a nutritionist has been made. She has been advised to skip dinner on alternate days to reduce her caloric intake. Fasting insulin, hemoglobin A1c, and testosterone levels will be checked. Depending on the results, metformin may be prescribed to enhance insulin sensitivity and promote weight loss.    3. Cough.  The cough is likely of viral origin. A 5-day course of Z-Fernando has been prescribed as a precautionary measure.    4. Hormonal imbalance.  Hormone levels including testosterone, estradiol, and progesterone will be checked. Depending on the results, spironolactone may be considered to lower testosterone levels if they are found to be high.          ORDERS:  1. Infertility counseling    - Referral to OB/Gyn  - TESTOSTERONE F&T FEMALES/CHILD; Future  - ESTRADIOL; Future  - PROGESTERONE; Future  - FSH/LH; Future    2. History of DVT (deep vein thrombosis)    - Referral to OB/Gyn    3. Heterozygous factor V Leiden mutation (HCC)    - Referral to OB/Gyn    4. Acute deep vein thrombosis of pelvic vein    - Referral to  OB/Gyn    5. Obesity (BMI 30-39.9)    - Patient identified as having weight management issue.  Appropriate orders and counseling given.  - Referral to Nutrition Services  - INSULIN FASTING; Future    6. Screening for lipid disorders    - LIPID PANEL    7. Screening for metabolic disorder    - HEMOGLOBIN A1C; Future  - Comp Metabolic Panel; Future    8. Other fatigue    - CBC WITH DIFFERENTIAL; Future  - TSH WITH REFLEX TO FT4; Future    9. Acute cough    - azithromycin (ZITHROMAX) 250 MG Tab; Take two tablets on day one, then on tablet the following four days  Dispense: 6 Tablet; Refill: 0    () Today's E/M visit is associated with medical care services that serve as the continuing focal point for all needed health care services and/or with medical care services that are part of ongoing care related to a patient's single, serious condition, or a complex condition: This includes furnishing services to patients on an ongoing basis that result in care that is personalized to the patient. The services result in a comprehensive, longitudinal, and continuous relationship with the patient and involve delivery of team-based care that is accessible, coordinated with other practitioners and providers, and integrated with the broader health care landscape.     Anticipatory guidance discussed at length including regular daily exercise with a goal of 150 minutes a week.  Recommendation to eat the Mediterranean diet to decrease cardiovascular risk.  Encouraged avoiding high fructose corn syrup and other simple sugars to reduce risk of obesity and type 2 diabetes.    Follow Up: 8 weeks    Please note that this dictation was created using voice recognition software. I have made every reasonable attempt to correct obvious errors, but I expect that there are errors of grammar and possibly content that I did not discover before finalizing the note.      Attestation      Verbal consent was acquired by the patient to use ANA  Copilot ambient listening note generation during this visit Yes

## 2024-12-10 ENCOUNTER — APPOINTMENT (OUTPATIENT)
Dept: URBAN - METROPOLITAN AREA CLINIC 4 | Facility: CLINIC | Age: 34
Setting detail: DERMATOLOGY
End: 2024-12-10

## 2024-12-10 DIAGNOSIS — D485 NEOPLASM OF UNCERTAIN BEHAVIOR OF SKIN: ICD-10-CM

## 2024-12-10 DIAGNOSIS — L20.89 OTHER ATOPIC DERMATITIS: ICD-10-CM | Status: INADEQUATELY CONTROLLED

## 2024-12-10 PROBLEM — D48.5 NEOPLASM OF UNCERTAIN BEHAVIOR OF SKIN: Status: ACTIVE | Noted: 2024-12-10

## 2024-12-10 PROCEDURE — 99213 OFFICE O/P EST LOW 20 MIN: CPT | Mod: 25

## 2024-12-10 PROCEDURE — ? BIOPSY BY SHAVE METHOD

## 2024-12-10 PROCEDURE — ? COUNSELING

## 2024-12-10 PROCEDURE — ? MEDICATION COUNSELING

## 2024-12-10 PROCEDURE — ? PRESCRIPTION

## 2024-12-10 PROCEDURE — 56605 BIOPSY OF VULVA/PERINEUM: CPT

## 2024-12-10 RX ORDER — DESONIDE 0.5 MG/G
CREAM TOPICAL
Qty: 60 | Refills: 0 | Status: ERX | COMMUNITY
Start: 2024-12-10

## 2024-12-10 RX ADMIN — DESONIDE: 0.5 CREAM TOPICAL at 00:00

## 2024-12-10 ASSESSMENT — LOCATION DETAILED DESCRIPTION DERM: LOCATION DETAILED: RIGHT MONS PUBIS

## 2024-12-10 ASSESSMENT — LOCATION SIMPLE DESCRIPTION DERM: LOCATION SIMPLE: MONS PUBIS

## 2024-12-10 ASSESSMENT — LOCATION ZONE DERM: LOCATION ZONE: VULVA

## 2024-12-10 NOTE — PROCEDURE: MEDICATION COUNSELING
143.3 Thalidomide Counseling: I discussed with the patient the risks of thalidomide including but not limited to birth defects, anxiety, weakness, chest pain, dizziness, cough and severe allergy.

## 2024-12-10 NOTE — PROCEDURE: BIOPSY BY SHAVE METHOD
Detail Level: Detailed
Depth Of Biopsy: dermis
Was A Bandage Applied: Yes
Size Of Lesion In Cm: 0.4
X Size Of Lesion In Cm: 0
Biopsy Type: H and E
Biopsy Method: Dermablade
Anesthesia Type: 1% lidocaine without epinephrine
Anesthesia Volume In Cc: 0.5
Hemostasis: Drysol
Wound Care: Petrolatum
Dressing: bandage
Destruction After The Procedure: No
Type Of Destruction Used: Curettage
Curettage Text: The wound bed was treated with curettage after the biopsy was performed.
Cryotherapy Text: The wound bed was treated with cryotherapy after the biopsy was performed.
Electrodesiccation Text: The wound bed was treated with electrodesiccation after the biopsy was performed.
Electrodesiccation And Curettage Text: The wound bed was treated with electrodesiccation and curettage after the biopsy was performed.
Silver Nitrate Text: The wound bed was treated with silver nitrate after the biopsy was performed.
Lab: [id number]
Lab Facility: 
Medical Necessity Information: It is in your best interest to select a reason for this procedure from the list below. All of these items fulfill various CMS LCD requirements except the new and changing color options.
Consent: Written consent was obtained and risks were reviewed including but not limited to scarring, infection, bleeding, scabbing, incomplete removal, nerve damage and allergy to anesthesia.
Post-Care Instructions: I reviewed with the patient in detail post-care instructions. Patient is to keep the biopsy site dry overnight, and then apply bacitracin twice daily until healed. Patient may apply hydrogen peroxide soaks to remove any crusting.
Notification Instructions: Patient will be notified of biopsy results. However, patient instructed to call the office if not contacted within 2 weeks.
Billing Type: Third-Party Bill
Information: Selecting Yes will display possible errors in your note based on the variables you have selected. This validation is only offered as a suggestion for you. PLEASE NOTE THAT THE VALIDATION TEXT WILL BE REMOVED WHEN YOU FINALIZE YOUR NOTE. IF YOU WANT TO FAX A PRELIMINARY NOTE YOU WILL NEED TO TOGGLE THIS TO 'NO' IF YOU DO NOT WANT IT IN YOUR FAXED NOTE.

## 2024-12-10 NOTE — PROCEDURE: MEDICATION COUNSELING
moderate assist (50% patients effort) Ivermectin Counseling:  Patient instructed to take medication on an empty stomach with a full glass of water.  Patient informed of potential adverse effects including but not limited to nausea, diarrhea, dizziness, itching, and swelling of the extremities or lymph nodes.  The patient verbalized understanding of the proper use and possible adverse effects of ivermectin.  All of the patient's questions and concerns were addressed.

## 2024-12-10 NOTE — PROCEDURE: MEDICATION COUNSELING
Xelanjelicaz Pregnancy And Lactation Text: This medication is Pregnancy Category D and is not considered safe during pregnancy.  The risk during breast feeding is also uncertain.

## 2024-12-10 NOTE — PROCEDURE: MEDICATION COUNSELING
HISTORY OF PRESENT ILLNESS  Deborrmerly Franklin is a 64 [facility] soon,[facility] [address]    2. Metastatic cancer to brain (HCC),awaiting RT,Oncology f/u. Spouse uncertain of appts. to calll oncology Monday when open    3. Controlled type 2 diabetes mellitus without complication, without long-term current use of insulin (HCC)  -     AMB POC HEMOGLOBIN A1C    4. Chronic obstructive pulmonary disease, unspecified COPD type ([facility] [address]    5. Essential hypertension, benign    6.  Encounter for immunization  -     INFLUENZA, FLUARIX, FLULAVAL, FLUZONE (AGE 6 MO+), AFLURIA(AGE 3Y+) IM, PF, 0.5 ML Oxybutynin Pregnancy And Lactation Text: This medication is Pregnancy Category B and is considered safe during pregnancy. It is unknown if it is excreted in breast milk.

## 2024-12-10 NOTE — PROCEDURE: MEDICATION COUNSELING
Saxenda Pregnancy And Lactation Text: The fetal risk of this medication is unknown and taking while pregnant is not recommended. It is unknown if this medication is present in breast milk.

## 2024-12-11 ENCOUNTER — HOSPITAL ENCOUNTER (OUTPATIENT)
Dept: LAB | Facility: MEDICAL CENTER | Age: 34
End: 2024-12-11
Attending: PHYSICIAN ASSISTANT
Payer: COMMERCIAL

## 2024-12-11 DIAGNOSIS — Z31.69 INFERTILITY COUNSELING: ICD-10-CM

## 2024-12-11 DIAGNOSIS — R53.83 OTHER FATIGUE: ICD-10-CM

## 2024-12-11 DIAGNOSIS — E66.9 OBESITY (BMI 30-39.9): ICD-10-CM

## 2024-12-11 DIAGNOSIS — Z13.228 SCREENING FOR METABOLIC DISORDER: ICD-10-CM

## 2024-12-11 LAB
BASOPHILS # BLD AUTO: 0.3 % (ref 0–1.8)
BASOPHILS # BLD: 0.02 K/UL (ref 0–0.12)
EOSINOPHIL # BLD AUTO: 0.03 K/UL (ref 0–0.51)
EOSINOPHIL NFR BLD: 0.4 % (ref 0–6.9)
ERYTHROCYTE [DISTWIDTH] IN BLOOD BY AUTOMATED COUNT: 41.3 FL (ref 35.9–50)
EST. AVERAGE GLUCOSE BLD GHB EST-MCNC: 108 MG/DL
HBA1C MFR BLD: 5.4 % (ref 4–5.6)
HCT VFR BLD AUTO: 40.1 % (ref 37–47)
HGB BLD-MCNC: 13.3 G/DL (ref 12–16)
IMM GRANULOCYTES # BLD AUTO: 0.03 K/UL (ref 0–0.11)
IMM GRANULOCYTES NFR BLD AUTO: 0.4 % (ref 0–0.9)
LYMPHOCYTES # BLD AUTO: 1.71 K/UL (ref 1–4.8)
LYMPHOCYTES NFR BLD: 25.5 % (ref 22–41)
MCH RBC QN AUTO: 29 PG (ref 27–33)
MCHC RBC AUTO-ENTMCNC: 33.2 G/DL (ref 32.2–35.5)
MCV RBC AUTO: 87.6 FL (ref 81.4–97.8)
MONOCYTES # BLD AUTO: 0.47 K/UL (ref 0–0.85)
MONOCYTES NFR BLD AUTO: 7 % (ref 0–13.4)
NEUTROPHILS # BLD AUTO: 4.45 K/UL (ref 1.82–7.42)
NEUTROPHILS NFR BLD: 66.4 % (ref 44–72)
NRBC # BLD AUTO: 0 K/UL
NRBC BLD-RTO: 0 /100 WBC (ref 0–0.2)
PLATELET # BLD AUTO: 409 K/UL (ref 164–446)
PMV BLD AUTO: 9.1 FL (ref 9–12.9)
RBC # BLD AUTO: 4.58 M/UL (ref 4.2–5.4)
WBC # BLD AUTO: 6.7 K/UL (ref 4.8–10.8)

## 2024-12-11 PROCEDURE — 85025 COMPLETE CBC W/AUTO DIFF WBC: CPT

## 2024-12-11 PROCEDURE — 36415 COLL VENOUS BLD VENIPUNCTURE: CPT

## 2024-12-11 PROCEDURE — 84403 ASSAY OF TOTAL TESTOSTERONE: CPT

## 2024-12-11 PROCEDURE — 83002 ASSAY OF GONADOTROPIN (LH): CPT

## 2024-12-11 PROCEDURE — 84270 ASSAY OF SEX HORMONE GLOBUL: CPT

## 2024-12-11 PROCEDURE — 80061 LIPID PANEL: CPT

## 2024-12-11 PROCEDURE — 84144 ASSAY OF PROGESTERONE: CPT

## 2024-12-11 PROCEDURE — 80053 COMPREHEN METABOLIC PANEL: CPT

## 2024-12-11 PROCEDURE — 84443 ASSAY THYROID STIM HORMONE: CPT

## 2024-12-11 PROCEDURE — 84402 ASSAY OF FREE TESTOSTERONE: CPT

## 2024-12-11 PROCEDURE — 83036 HEMOGLOBIN GLYCOSYLATED A1C: CPT

## 2024-12-11 PROCEDURE — 82670 ASSAY OF TOTAL ESTRADIOL: CPT

## 2024-12-11 PROCEDURE — 83525 ASSAY OF INSULIN: CPT

## 2024-12-11 PROCEDURE — 83001 ASSAY OF GONADOTROPIN (FSH): CPT

## 2024-12-12 LAB
ALBUMIN SERPL BCP-MCNC: 4.5 G/DL (ref 3.2–4.9)
ALBUMIN/GLOB SERPL: 1.5 G/DL
ALP SERPL-CCNC: 92 U/L (ref 30–99)
ALT SERPL-CCNC: 17 U/L (ref 2–50)
ANION GAP SERPL CALC-SCNC: 10 MMOL/L (ref 7–16)
AST SERPL-CCNC: 16 U/L (ref 12–45)
BILIRUB SERPL-MCNC: 0.3 MG/DL (ref 0.1–1.5)
BUN SERPL-MCNC: 13 MG/DL (ref 8–22)
CALCIUM ALBUM COR SERPL-MCNC: 8.7 MG/DL (ref 8.5–10.5)
CALCIUM SERPL-MCNC: 9.1 MG/DL (ref 8.5–10.5)
CHLORIDE SERPL-SCNC: 105 MMOL/L (ref 96–112)
CHOLEST SERPL-MCNC: 139 MG/DL (ref 100–199)
CO2 SERPL-SCNC: 23 MMOL/L (ref 20–33)
CREAT SERPL-MCNC: 0.67 MG/DL (ref 0.5–1.4)
ESTRADIOL SERPL-MCNC: 45 PG/ML
FSH SERPL-ACNC: 6.2 MIU/ML
GFR SERPLBLD CREATININE-BSD FMLA CKD-EPI: 119 ML/MIN/1.73 M 2
GLOBULIN SER CALC-MCNC: 3 G/DL (ref 1.9–3.5)
GLUCOSE SERPL-MCNC: 76 MG/DL (ref 65–99)
HDLC SERPL-MCNC: 38 MG/DL
LDLC SERPL CALC-MCNC: 87 MG/DL
LH SERPL-ACNC: 5.4 IU/L
POTASSIUM SERPL-SCNC: 3.8 MMOL/L (ref 3.6–5.5)
PROGEST SERPL-MCNC: 0.12 NG/ML
PROT SERPL-MCNC: 7.5 G/DL (ref 6–8.2)
SODIUM SERPL-SCNC: 138 MMOL/L (ref 135–145)
TRIGL SERPL-MCNC: 72 MG/DL (ref 0–149)
TSH SERPL DL<=0.005 MIU/L-ACNC: 3.19 UIU/ML (ref 0.38–5.33)

## 2024-12-13 LAB — INSULIN P FAST SERPL-ACNC: 11 UIU/ML (ref 3–25)

## 2024-12-16 LAB
SHBG SERPL-SCNC: 27 NMOL/L (ref 25–122)
TESTOST FREE SERPL-MCNC: 2.9 PG/ML (ref 1.3–9.2)
TESTOST SERPL-MCNC: 16 NG/DL (ref 9–55)

## 2024-12-31 PROCEDURE — RXMED WILLOW AMBULATORY MEDICATION CHARGE: Performed by: FAMILY MEDICINE

## 2025-01-08 ENCOUNTER — PHARMACY VISIT (OUTPATIENT)
Dept: PHARMACY | Facility: MEDICAL CENTER | Age: 35
End: 2025-01-08
Payer: COMMERCIAL

## 2025-01-14 ENCOUNTER — OFFICE VISIT (OUTPATIENT)
Dept: VASCULAR LAB | Facility: MEDICAL CENTER | Age: 35
End: 2025-01-14
Attending: FAMILY MEDICINE
Payer: COMMERCIAL

## 2025-01-14 VITALS
HEART RATE: 79 BPM | DIASTOLIC BLOOD PRESSURE: 72 MMHG | BODY MASS INDEX: 35.79 KG/M2 | HEIGHT: 67 IN | WEIGHT: 228 LBS | SYSTOLIC BLOOD PRESSURE: 104 MMHG

## 2025-01-14 DIAGNOSIS — Z31.69 PRE-CONCEPTION COUNSELING: ICD-10-CM

## 2025-01-14 DIAGNOSIS — D68.51 HETEROZYGOUS FACTOR V LEIDEN MUTATION (HCC): ICD-10-CM

## 2025-01-14 DIAGNOSIS — Z79.01 CHRONIC ANTICOAGULATION: ICD-10-CM

## 2025-01-14 PROCEDURE — 3074F SYST BP LT 130 MM HG: CPT | Performed by: FAMILY MEDICINE

## 2025-01-14 PROCEDURE — 99213 OFFICE O/P EST LOW 20 MIN: CPT | Performed by: FAMILY MEDICINE

## 2025-01-14 PROCEDURE — 99212 OFFICE O/P EST SF 10 MIN: CPT

## 2025-01-14 PROCEDURE — 3078F DIAST BP <80 MM HG: CPT | Performed by: FAMILY MEDICINE

## 2025-01-14 ASSESSMENT — ENCOUNTER SYMPTOMS
WHEEZING: 0
HEMOPTYSIS: 0
PND: 0
CHILLS: 0
ORTHOPNEA: 0
CLAUDICATION: 0
BRUISES/BLEEDS EASILY: 1
PALPITATIONS: 0
BLOOD IN STOOL: 0
COUGH: 0
FEVER: 0
SPUTUM PRODUCTION: 0
SHORTNESS OF BREATH: 0

## 2025-01-14 ASSESSMENT — FIBROSIS 4 INDEX: FIB4 SCORE: 0.3

## 2025-01-14 NOTE — PROGRESS NOTES
FOLLOW-UP VASCULAR  MED ANTICOAGULATION CLINIC  01/14/25   Cassie Powers is a female who presents for f/u   Initially referred by Anita Domínguez P.A.-C. for VTE and LOT mgmt, est 2021    Subjective    Interval hx/concerns: last seen 7/2024, still pending conception.  Using lovenox 40mg daily, no major issues.   Pending fertility MD evaluation ([facility], Dr. Ochoa).  Unable to conceive, G0    VTE disease / Anticoagulation:   Current agent: lovenox 40mg daily  - good adherence, no new hematomas   Date of initiation of anticoagulation: 6/23/21   Pertinent VTE pmhx:   Date of Diagnosis: 6/23/21   Type of Venous thromboembolic disease (VTE): acute R pelvic vein DVT   Initial visit hx: R lower pelvic pain, acute.  Has Mirena IUD placed >1yr ago.    VTE tx course: Eliquis 10mg BID x 7d, now eliquis 5mg BID   PROVOKING FACTORS:  Any personal VTE hx? No  Any family VTE hx? No  Recent surgery ? No  Recent trauma ? No  Smoker?  reports that she has never smoked. She has never used smokeless tobacco.    Extended travel? No  Other periods of immobility? No  Other known or potential risk factors for VTE disease:  no  WOMEN: Hx of cancer or abnormal cancer screenings: no       Any estrogen, testosterone HRT, E2 birth control, tamoxifene, raloxifene: Mirena IUD       Hx of recurrent miscarriages: no  Hypercoaguability work-up completed?  YES - HETERO FVL+     Current Outpatient Medications on File Prior to Visit   Medication Sig Dispense Refill    azithromycin (ZITHROMAX) 250 MG Tab Take two tablets on day one, then on tablet the following four days 6 Tablet 0    enoxaparin (LOVENOX) 40 MG/0.4ML Solution Prefilled Syringe inj Inject 40 mg under the skin every day at 6 PM. 30 Each 11     No current facility-administered medications on file prior to visit.      Allergies   Allergen Reactions    Latex Hives and Itching    Lavender Oil       Social History     Tobacco Use    Smoking status: Never    Smokeless tobacco: Never  "  Vaping Use    Vaping status: Never Used   Substance Use Topics    Alcohol use: Not Currently     Comment: occ     Drug use: Never     DIET AND EXERCISE:  Weight Change:stable   diet: common adult  Exercise: minimal exercise     Review of Systems   Constitutional:  Negative for chills, fever and malaise/fatigue.   HENT:  Negative for nosebleeds.    Respiratory:  Negative for cough, hemoptysis, sputum production, shortness of breath and wheezing.    Cardiovascular:  Negative for chest pain, palpitations, orthopnea, claudication, leg swelling and PND.   Gastrointestinal:  Negative for blood in stool.   Endo/Heme/Allergies:  Bruises/bleeds easily.      Objective   Vitals:    01/14/25 1551   BP: 104/72   BP Location: Left arm   Patient Position: Sitting   BP Cuff Size: Large adult   Pulse: 79   Weight: 103 kg (228 lb)   Height: 1.702 m (5' 7\")      BP Readings from Last 5 Encounters:   01/14/25 104/72   12/05/24 124/82   07/18/24 116/74   06/06/24 107/73   04/23/24 124/72     Body mass index is 35.71 kg/m².  Wt Readings from Last 3 Encounters:   01/14/25 103 kg (228 lb)   12/05/24 103 kg (226 lb)   07/18/24 99.3 kg (219 lb)      Physical Exam  Vitals reviewed.   Constitutional:       General: She is not in acute distress.     Appearance: Normal appearance.   HENT:      Head: Normocephalic and atraumatic.   Neck:      Thyroid: No thyroid mass.      Vascular: Normal carotid pulses.      Trachea: Trachea normal.   Cardiovascular:      Rate and Rhythm: Normal rate and regular rhythm.      Chest Wall: PMI is not displaced.      Pulses: Normal pulses.           Carotid pulses are 2+ on the right side and 2+ on the left side.       Radial pulses are 2+ on the right side and 2+ on the left side.        Dorsalis pedis pulses are 2+ on the right side and 2+ on the left side.        Posterior tibial pulses are 2+ on the right side and 2+ on the left side.      Heart sounds: Normal heart sounds.   Pulmonary:      Effort: Pulmonary " "effort is normal.      Breath sounds: Normal breath sounds.   Musculoskeletal:      Cervical back: Full passive range of motion without pain.      Right lower leg: No edema.      Left lower leg: No edema.   Skin:     General: Skin is warm and dry.      Capillary Refill: Capillary refill takes less than 2 seconds.      Coloration: Skin is not cyanotic.      Nails: There is no clubbing.   Neurological:      General: No focal deficit present.      Mental Status: She is alert and oriented to person, place, and time. Mental status is at baseline.      Cranial Nerves: No cranial nerve deficit.      Coordination: Coordination is intact. Coordination normal.      Gait: Gait is intact. Gait normal.   Psychiatric:         Mood and Affect: Mood normal.         Behavior: Behavior normal.       Lab Results   Component Value Date    CHOLSTRLTOT 139 12/11/2024    LDL 87 12/11/2024    HDL 38 (A) 12/11/2024    TRIGLYCERIDE 72 12/11/2024         Lab Results   Component Value Date/Time    LDL 87 12/11/2024 01:31 PM     No results found for: \"LIPOPROTA\"   No results found for: \"APOB\"   No results found for: \"CRPHIGHSEN\"    Lab Results   Component Value Date    HBA1C 5.4 12/11/2024      Lab Results   Component Value Date    SODIUM 138 12/11/2024    POTASSIUM 3.8 12/11/2024    CHLORIDE 105 12/11/2024    CO2 23 12/11/2024    GLUCOSE 76 12/11/2024    BUN 13 12/11/2024    CREATININE 0.67 12/11/2024        Lab Results   Component Value Date    WBC 6.7 12/11/2024    RBC 4.58 12/11/2024    HEMOGLOBIN 13.3 12/11/2024    HEMATOCRIT 40.1 12/11/2024    MCV 87.6 12/11/2024    MCH 29.0 12/11/2024    MCHC 33.2 12/11/2024    MPV 9.1 12/11/2024       Ref. Range 9/29/2021 11:02   Anti-Cardiolipin Ab Iga Latest Ref Range: 0 - 11 APL <10   Anti-Cardiolipin Ab Igm Latest Ref Range: 0 - 12 MPL 13 (H)   Anti-Cariolipin Ab Igg Latest Ref Range: 0 - 14 GPL <10   Beta-2 Glycoprotein I Ab Igg Latest Ref Range: 0 - 20 SGU <10   Beta-2 Glycoprotein I Iga Latest " Ref Range: 0 - 20 EMELIA <10   Beta-2 Glycoprotein I Igm Latest Ref Range: 0 - 20 SMU <10      Ref. Range 9/29/2021 11:02   Factor Ii Dna Analysis Pt Gene Unknown Negative   V Leiden Factor Unknown Heterozygous (A)       IMAGING:    CT abd/pelvis 6/23/21   1.  Right ovarian vein thrombophlebitis and thrombosis of right internal iliac vein. There are additional prominent dilated tubular structures in the pelvis on the right which probably represent dilated thrombosed veins with adjacent fat stranding   possibly related to edema. An underlying inflammatory process in the right adnexa cannot be excluded. Consider further characterization with pelvic ultrasound. Gynecology consultation is suggested.  2.  Mild right-sided urinary bladder wall thickening which could be reactive however correlate with urinalysis.  3.  IUD is well positioned.  4.  Normal appendix.    US pelvis 6/23/21   1.  Complex right adnexal tubular structure of uncertain etiology, possibly dilated fallopian tube with irregular contents versus partially thrombosed vessels/vein.  2.  Right ovary contains multiple small follicles. Blood flow could not be obtained within the right ovary which is indeterminate and possibly technical versus ovarian torsion. The ovaries not particularly enlarged.  3.  The left ovary is not visualized.  4.  Gynecology consultation is suggested.    US pelvis 9/29/21   Normal transvaginal appearance of the pelvis.  No evidence of thrombosed vascular structure about the right ovary.          Medical Decision Making:  Today's Assessment / Status / Plan:     1. Heterozygous factor V Leiden mutation (HCC)        2. Chronic anticoagulation        3. Pre-conception counseling          Established CVD:     1) heterozygous FVL+  with hx of R pelvic pain venous thrombosis, 6/2021 - no sx  - resolved on repeat pelvis US 9/2021   - no recurrent VTE events   Plan:  - no imaging needed  - see antithromb plan below   - counseled on signs and  symptoms of acute VTE that require seeking prompt attention in the ED to include shortness of breath, chest pain, pain with deep inhalation, acute leg swelling and/or pain in calf or leg, and worsening pelvic pain  - elevate legs as much as possible, use compression stockings/socks if directed by your provider  - Avoid hormonal therapies including estrogen or testosterone-containing meds, or raloxifene or tamoxifene (commonly used for osteoporosis)  - Avoid sedentary periods  - continue complete avoidance of tobacco products  - if having any invasive procedure,please make sure the doctor knows of your history of blood clots and current anticoagulation status  - avoid Aspirin and anti-inflammatories (eg. Advil, ibuprofen, Aleve, naproxen, etc) while anticoagulated   - avoid dangerous activities to reduce risk of head injury and brain bleeds    ANTITHROMBOTIC THERAPY:  Date of initiation: 6/23/21   HAS-BLED bleeding risk calc ([email or web address]): 0 pts, 0.9%, low risk   Thrombophilia/hypercoag evaluation:  Hetero FVL+   Factors to consider for indefinite OAC: VTE at atypical site (mesenteric, portal, cerebral vv)  Last CBC, BMP: reviewed above   Expected duration: has completed initial phase of treatment for pelvic V DVT, have reviewed FVL+ and through shared MDM we have opted to continue indefinite full-dose eliquis.  Reviewed that reduced dose not studied in thrombophilia   -Though FVL provides a 2-3 fold increased RR of 1st episode of VTE, it is generally in the context of a secondary provoking factor.    - Though presence of hetero FVL+ is not an absolute indication of lifelong OAC, it does strengthen the case of it in certain patients.  - since she had one isolated VTE event and currently stable but had hematoma with treatment dose of lovenox, will try to maintain prevention of VTE during conception/pregnancy and reduce volume of med by using preventive dosing   - current wt 98kg, so will use lovenox 40mg daily and only  "require 0.4mL injection volume  - should VTE event occur, will need adjustment back to treatment dose   - \"Highlow\" trial showed intermediate dosed lovenox did not reduce VTE risk compared to low dose indicated that likely fixed low dose lovenox is reasonable in women with hx of prior VTE event during pregnancy   - transitioned from eliquis to lovenox due to planned conception since 7/2024   PLAN  - continue lovenox 40mg daily - continue at this dosage unless VTE event occurs then will transition to treatment dosing   - dosing can be adjusted as per high-risk pregnancy provider if needed   - if ongoing issues with lovenox, may consider UFH vs dalteprin as acceptable alternatives   - stressed strict adherence to tx   - if any bleeding lasting 30min without stopping, please seek care with your PCP, urgent care, or ED  - reversal agents for most blood thinners are now available and used if you have major bleeding    LIPID MANAGEMENT:   Qualifies for Statin Therapy per ACC/AHA Guidelines: no  The ASCVD Risk score (Omer SANCHEZ, et al., 2019) failed to calculate.   Major ASCVD events: None  High-risk conditions: None   Risk-enhancers: N/A  Currently on Statin: No  Treatment goals: LDL-C <100 ideally, but <130 reasonable due to low ascvd risk score  At goal? N/A  Plan:   - reinforced ongoing TLC measures as noted   - defer lab surveillance to PCP   Meds: none at this time      BLOOD PRESSURE MANAGEMENT:  ACC/AHA goal <130/80  Home BP at goal: yes  Office BP at goal:  yes   Plan:   - continue healthy diet, activity, weight mgmt   - routine clinic-based BP measurements at least once annually   Medications: no meds indicated at this time       GLYCEMIC STATUS:  Normal    LIFESTYLE INTERVENTIONS  TOBACCO: continued complete avoidance of all tobacco products   PHYSICAL ACTIVITY: >150min/week of mod-intensity activity or as much as tolerated  NUTRITION: Mediterranean   ETOH: limit to 1 or less standard drinks/day  WT MGMT: " maintain healthy weight    OTHER:    # pre-conception counseling, pending conception, possible trouble with fertility   PLAN  - avoidance of E2-containing modalities in the future  - reviewed preconception and DOAC mgmt counseling as noted above   - continue with OB and establish with high risk OB after conception   - pending with fertility specialist     STUDIES:  none   LABS: none  FOLLOW-UP: 6 months     Josh Pompa M.D.  [facility]   [facility]   [phone]     Additional Notes: Discussed starting UVB therapy again with patient, patient advised to  with [facility] first, advised 2-3 treatments a week with [facility]ie Detail Level: Simple

## 2025-01-21 ENCOUNTER — APPOINTMENT (OUTPATIENT)
Dept: MEDICAL GROUP | Facility: MEDICAL CENTER | Age: 35
End: 2025-01-21
Payer: COMMERCIAL

## 2025-01-23 ENCOUNTER — APPOINTMENT (OUTPATIENT)
Dept: MEDICAL GROUP | Facility: MEDICAL CENTER | Age: 35
End: 2025-01-23
Payer: COMMERCIAL

## 2025-02-10 ENCOUNTER — HOSPITAL ENCOUNTER (OUTPATIENT)
Facility: MEDICAL CENTER | Age: 35
End: 2025-02-10
Attending: OBSTETRICS & GYNECOLOGY
Payer: COMMERCIAL

## 2025-02-10 LAB
B-HCG SERPL-ACNC: <1 MIU/ML (ref 0–5)
PROGEST SERPL-MCNC: 0.4 NG/ML

## 2025-02-10 PROCEDURE — 84144 ASSAY OF PROGESTERONE: CPT

## 2025-02-10 PROCEDURE — 84702 CHORIONIC GONADOTROPIN TEST: CPT

## 2025-02-12 ENCOUNTER — HOSPITAL ENCOUNTER (OUTPATIENT)
Dept: LAB | Facility: MEDICAL CENTER | Age: 35
End: 2025-02-12
Attending: OBSTETRICS & GYNECOLOGY
Payer: COMMERCIAL

## 2025-02-12 PROCEDURE — 36415 COLL VENOUS BLD VENIPUNCTURE: CPT

## 2025-02-12 PROCEDURE — 84702 CHORIONIC GONADOTROPIN TEST: CPT

## 2025-02-13 LAB — B-HCG SERPL-ACNC: <1 MIU/ML (ref 0–5)

## 2025-02-18 ENCOUNTER — HOSPITAL ENCOUNTER (OUTPATIENT)
Dept: LAB | Facility: MEDICAL CENTER | Age: 35
End: 2025-02-18
Attending: OBSTETRICS & GYNECOLOGY
Payer: COMMERCIAL

## 2025-02-18 PROCEDURE — 82166 ASSAY ANTI-MULLERIAN HORM: CPT

## 2025-02-18 PROCEDURE — 83525 ASSAY OF INSULIN: CPT | Mod: 91

## 2025-02-18 PROCEDURE — 36415 COLL VENOUS BLD VENIPUNCTURE: CPT

## 2025-02-18 PROCEDURE — 82951 GLUCOSE TOLERANCE TEST (GTT): CPT

## 2025-02-20 LAB
GLUCOSE 1H P CHAL SERPL-MCNC: 131 MG/DL (ref 65–199)
GLUCOSE 2H P CHAL SERPL-MCNC: 109 MG/DL (ref 65–139)
GLUCOSE BS SERPL-MCNC: 80 MG/DL (ref 65–99)
INSULIN 1H P CHAL SERPL-ACNC: 84 UIU/ML (ref 29–88)
INSULIN 2H P CHAL SERPL-ACNC: 89 UIU/ML (ref 22–79)
INSULIN P FAST SERPL-ACNC: 11 UIU/ML (ref 3–25)

## 2025-02-21 LAB — MIS SERPL-MCNC: 1.48 NG/ML (ref 0.18–11.71)

## 2025-03-03 ENCOUNTER — HOSPITAL ENCOUNTER (OUTPATIENT)
Facility: MEDICAL CENTER | Age: 35
End: 2025-03-03
Attending: OBSTETRICS & GYNECOLOGY
Payer: COMMERCIAL

## 2025-03-03 LAB
B-HCG SERPL-ACNC: 4.6 MIU/ML (ref 0–5)
PROGEST SERPL-MCNC: 8.89 NG/ML

## 2025-03-03 PROCEDURE — 84144 ASSAY OF PROGESTERONE: CPT

## 2025-03-03 PROCEDURE — 84702 CHORIONIC GONADOTROPIN TEST: CPT

## 2025-03-05 ENCOUNTER — HOSPITAL ENCOUNTER (OUTPATIENT)
Dept: LAB | Facility: MEDICAL CENTER | Age: 35
End: 2025-03-05
Attending: OBSTETRICS & GYNECOLOGY
Payer: COMMERCIAL

## 2025-03-05 LAB — B-HCG SERPL-ACNC: 24.5 MIU/ML (ref 0–5)

## 2025-03-05 PROCEDURE — 36415 COLL VENOUS BLD VENIPUNCTURE: CPT

## 2025-03-05 PROCEDURE — 84702 CHORIONIC GONADOTROPIN TEST: CPT

## 2025-03-07 ENCOUNTER — HOSPITAL ENCOUNTER (OUTPATIENT)
Facility: MEDICAL CENTER | Age: 35
End: 2025-03-07
Attending: OBSTETRICS & GYNECOLOGY
Payer: COMMERCIAL

## 2025-03-07 LAB
B-HCG SERPL-ACNC: 83.7 MIU/ML (ref 0–5)
PROGEST SERPL-MCNC: 16.4 NG/ML

## 2025-03-07 PROCEDURE — 84702 CHORIONIC GONADOTROPIN TEST: CPT

## 2025-03-07 PROCEDURE — 84144 ASSAY OF PROGESTERONE: CPT

## 2025-03-19 ENCOUNTER — TELEPHONE (OUTPATIENT)
Dept: VASCULAR LAB | Facility: MEDICAL CENTER | Age: 35
End: 2025-03-19
Payer: COMMERCIAL

## 2025-03-19 NOTE — TELEPHONE ENCOUNTER
Caller: Cassie See      Topic/issue: Patient was calling because she has been experiencing a blood clot and cramping in her right side that varries from a dull pain to a sharp pain and she was concerned because she's over 5 weeks pregnant and she was asking for a call back as to if she should have and ultrasound scheduled or do something else. Please advise      Callback Number: [phone]    Thank you    -Ventura CHAMPION

## 2025-03-19 NOTE — TELEPHONE ENCOUNTER
Spoke with patient to relay below message from Kiki CRESPO. Patient states understanding and is in agreement with going to ER for evaluation.       BOB [place].  Germania Gary, Med Ass't  Caller: Unspecified (Today, 12:31 PM)  Please call pt, and let her know she should follow up with her OB and/or go to ER for evaluation to make sure she does not have a new blood clot given her pain.  She should continue taking her lovenox unless changed or directed differently by ER/OB.              Germania Gary, Medical Assistant   [facility]   Ph: [phone]  Fx: [phone]

## 2025-03-27 ENCOUNTER — HOSPITAL ENCOUNTER (OUTPATIENT)
Facility: MEDICAL CENTER | Age: 35
End: 2025-03-27
Attending: OBSTETRICS & GYNECOLOGY
Payer: COMMERCIAL

## 2025-03-27 PROCEDURE — 86762 RUBELLA ANTIBODY: CPT

## 2025-03-27 PROCEDURE — 87389 HIV-1 AG W/HIV-1&-2 AB AG IA: CPT

## 2025-03-27 PROCEDURE — 86850 RBC ANTIBODY SCREEN: CPT

## 2025-03-27 PROCEDURE — 86900 BLOOD TYPING SEROLOGIC ABO: CPT

## 2025-03-27 PROCEDURE — 86780 TREPONEMA PALLIDUM: CPT

## 2025-03-27 PROCEDURE — 85025 COMPLETE CBC W/AUTO DIFF WBC: CPT

## 2025-03-27 PROCEDURE — 86803 HEPATITIS C AB TEST: CPT

## 2025-03-27 PROCEDURE — 87340 HEPATITIS B SURFACE AG IA: CPT

## 2025-03-27 PROCEDURE — 86901 BLOOD TYPING SEROLOGIC RH(D): CPT

## 2025-03-27 PROCEDURE — 87086 URINE CULTURE/COLONY COUNT: CPT

## 2025-03-28 LAB
ABO GROUP BLD: NORMAL
BASOPHILS # BLD AUTO: 0.4 % (ref 0–1.8)
BASOPHILS # BLD: 0.04 K/UL (ref 0–0.12)
BLD GP AB SCN SERPL QL: NORMAL
EOSINOPHIL # BLD AUTO: 0.02 K/UL (ref 0–0.51)
EOSINOPHIL NFR BLD: 0.2 % (ref 0–6.9)
ERYTHROCYTE [DISTWIDTH] IN BLOOD BY AUTOMATED COUNT: 41.7 FL (ref 35.9–50)
HBV SURFACE AG SER QL: ABNORMAL
HCT VFR BLD AUTO: 42.4 % (ref 37–47)
HCV AB SER QL: NORMAL
HGB BLD-MCNC: 13.9 G/DL (ref 12–16)
HIV 1+2 AB+HIV1 P24 AG SERPL QL IA: NORMAL
IMM GRANULOCYTES # BLD AUTO: 0.04 K/UL (ref 0–0.11)
IMM GRANULOCYTES NFR BLD AUTO: 0.4 % (ref 0–0.9)
LYMPHOCYTES # BLD AUTO: 2.09 K/UL (ref 1–4.8)
LYMPHOCYTES NFR BLD: 19.2 % (ref 22–41)
MCH RBC QN AUTO: 29.2 PG (ref 27–33)
MCHC RBC AUTO-ENTMCNC: 32.8 G/DL (ref 32.2–35.5)
MCV RBC AUTO: 89.1 FL (ref 81.4–97.8)
MONOCYTES # BLD AUTO: 0.77 K/UL (ref 0–0.85)
MONOCYTES NFR BLD AUTO: 7.1 % (ref 0–13.4)
NEUTROPHILS # BLD AUTO: 7.9 K/UL (ref 1.82–7.42)
NEUTROPHILS NFR BLD: 72.7 % (ref 44–72)
NRBC # BLD AUTO: 0 K/UL
NRBC BLD-RTO: 0 /100 WBC (ref 0–0.2)
PLATELET # BLD AUTO: 446 K/UL (ref 164–446)
PMV BLD AUTO: 8.9 FL (ref 9–12.9)
RBC # BLD AUTO: 4.76 M/UL (ref 4.2–5.4)
RH BLD: NORMAL
RUBV AB SER QL: 26.6 IU/ML
T PALLIDUM AB SER QL IA: ABNORMAL
WBC # BLD AUTO: 10.9 K/UL (ref 4.8–10.8)

## 2025-03-30 LAB
BACTERIA UR CULT: NORMAL
SIGNIFICANT IND 70042: NORMAL
SITE SITE: NORMAL
SOURCE SOURCE: NORMAL

## 2025-03-30 PROCEDURE — RXMED WILLOW AMBULATORY MEDICATION CHARGE: Performed by: FAMILY MEDICINE

## 2025-04-01 ENCOUNTER — PHARMACY VISIT (OUTPATIENT)
Dept: PHARMACY | Facility: MEDICAL CENTER | Age: 35
End: 2025-04-01
Payer: COMMERCIAL

## 2025-04-07 ENCOUNTER — HOSPITAL ENCOUNTER (EMERGENCY)
Facility: MEDICAL CENTER | Age: 35
End: 2025-04-07
Attending: STUDENT IN AN ORGANIZED HEALTH CARE EDUCATION/TRAINING PROGRAM
Payer: COMMERCIAL

## 2025-04-07 VITALS
TEMPERATURE: 98.4 F | WEIGHT: 222 LBS | DIASTOLIC BLOOD PRESSURE: 69 MMHG | SYSTOLIC BLOOD PRESSURE: 123 MMHG | HEIGHT: 67 IN | OXYGEN SATURATION: 97 % | BODY MASS INDEX: 34.84 KG/M2 | RESPIRATION RATE: 18 BRPM | HEART RATE: 66 BPM

## 2025-04-07 DIAGNOSIS — R11.2 NAUSEA AND VOMITING, UNSPECIFIED VOMITING TYPE: Primary | ICD-10-CM

## 2025-04-07 DIAGNOSIS — Z34.91 FIRST TRIMESTER PREGNANCY: ICD-10-CM

## 2025-04-07 LAB
ALBUMIN SERPL BCP-MCNC: 4.4 G/DL (ref 3.2–4.9)
ALBUMIN/GLOB SERPL: 1.3 G/DL
ALP SERPL-CCNC: 98 U/L (ref 30–99)
ALT SERPL-CCNC: 45 U/L (ref 2–50)
ANION GAP SERPL CALC-SCNC: 13 MMOL/L (ref 7–16)
APPEARANCE UR: CLEAR
AST SERPL-CCNC: 31 U/L (ref 12–45)
B-HCG SERPL-ACNC: ABNORMAL MIU/ML (ref 0–5)
BASOPHILS # BLD AUTO: 0.2 % (ref 0–1.8)
BASOPHILS # BLD: 0.02 K/UL (ref 0–0.12)
BILIRUB SERPL-MCNC: 0.5 MG/DL (ref 0.1–1.5)
BILIRUB UR QL STRIP.AUTO: NEGATIVE
BUN SERPL-MCNC: 8 MG/DL (ref 8–22)
CALCIUM ALBUM COR SERPL-MCNC: 9.5 MG/DL (ref 8.5–10.5)
CALCIUM SERPL-MCNC: 9.8 MG/DL (ref 8.5–10.5)
CHLORIDE SERPL-SCNC: 99 MMOL/L (ref 96–112)
CO2 SERPL-SCNC: 20 MMOL/L (ref 20–33)
COLOR UR: YELLOW
CREAT SERPL-MCNC: 0.71 MG/DL (ref 0.5–1.4)
EOSINOPHIL # BLD AUTO: 0.02 K/UL (ref 0–0.51)
EOSINOPHIL NFR BLD: 0.2 % (ref 0–6.9)
ERYTHROCYTE [DISTWIDTH] IN BLOOD BY AUTOMATED COUNT: 37.8 FL (ref 35.9–50)
GFR SERPLBLD CREATININE-BSD FMLA CKD-EPI: 115 ML/MIN/1.73 M 2
GLOBULIN SER CALC-MCNC: 3.5 G/DL (ref 1.9–3.5)
GLUCOSE SERPL-MCNC: 76 MG/DL (ref 65–99)
GLUCOSE UR STRIP.AUTO-MCNC: NEGATIVE MG/DL
HCT VFR BLD AUTO: 41.5 % (ref 37–47)
HGB BLD-MCNC: 14.2 G/DL (ref 12–16)
IMM GRANULOCYTES # BLD AUTO: 0.04 K/UL (ref 0–0.11)
IMM GRANULOCYTES NFR BLD AUTO: 0.3 % (ref 0–0.9)
KETONES UR STRIP.AUTO-MCNC: 80 MG/DL
LEUKOCYTE ESTERASE UR QL STRIP.AUTO: NEGATIVE
LIPASE SERPL-CCNC: 34 U/L (ref 11–82)
LYMPHOCYTES # BLD AUTO: 1.63 K/UL (ref 1–4.8)
LYMPHOCYTES NFR BLD: 13.8 % (ref 22–41)
MCH RBC QN AUTO: 29 PG (ref 27–33)
MCHC RBC AUTO-ENTMCNC: 34.2 G/DL (ref 32.2–35.5)
MCV RBC AUTO: 84.7 FL (ref 81.4–97.8)
MICRO URNS: ABNORMAL
MONOCYTES # BLD AUTO: 0.89 K/UL (ref 0–0.85)
MONOCYTES NFR BLD AUTO: 7.5 % (ref 0–13.4)
NEUTROPHILS # BLD AUTO: 9.25 K/UL (ref 1.82–7.42)
NEUTROPHILS NFR BLD: 78 % (ref 44–72)
NITRITE UR QL STRIP.AUTO: NEGATIVE
NRBC # BLD AUTO: 0 K/UL
NRBC BLD-RTO: 0 /100 WBC (ref 0–0.2)
NUMBER OF RH DOSES IND 8505RD: NORMAL
PH UR STRIP.AUTO: 5.5 [PH] (ref 5–8)
PLATELET # BLD AUTO: 451 K/UL (ref 164–446)
PMV BLD AUTO: 8.6 FL (ref 9–12.9)
POTASSIUM SERPL-SCNC: 3.4 MMOL/L (ref 3.6–5.5)
PROT SERPL-MCNC: 7.9 G/DL (ref 6–8.2)
PROT UR QL STRIP: NEGATIVE MG/DL
RBC # BLD AUTO: 4.9 M/UL (ref 4.2–5.4)
RBC UR QL AUTO: NEGATIVE
RH BLD: NORMAL
SODIUM SERPL-SCNC: 132 MMOL/L (ref 135–145)
SP GR UR STRIP.AUTO: 1.02
UROBILINOGEN UR STRIP.AUTO-MCNC: 1 EU/DL
WBC # BLD AUTO: 11.9 K/UL (ref 4.8–10.8)

## 2025-04-07 PROCEDURE — 85025 COMPLETE CBC W/AUTO DIFF WBC: CPT

## 2025-04-07 PROCEDURE — 86901 BLOOD TYPING SEROLOGIC RH(D): CPT

## 2025-04-07 PROCEDURE — 36415 COLL VENOUS BLD VENIPUNCTURE: CPT

## 2025-04-07 PROCEDURE — 700111 HCHG RX REV CODE 636 W/ 250 OVERRIDE (IP): Mod: JZ | Performed by: STUDENT IN AN ORGANIZED HEALTH CARE EDUCATION/TRAINING PROGRAM

## 2025-04-07 PROCEDURE — 81003 URINALYSIS AUTO W/O SCOPE: CPT

## 2025-04-07 PROCEDURE — 700105 HCHG RX REV CODE 258: Performed by: STUDENT IN AN ORGANIZED HEALTH CARE EDUCATION/TRAINING PROGRAM

## 2025-04-07 PROCEDURE — 84702 CHORIONIC GONADOTROPIN TEST: CPT

## 2025-04-07 PROCEDURE — 99284 EMERGENCY DEPT VISIT MOD MDM: CPT

## 2025-04-07 PROCEDURE — 87086 URINE CULTURE/COLONY COUNT: CPT

## 2025-04-07 PROCEDURE — 83690 ASSAY OF LIPASE: CPT

## 2025-04-07 PROCEDURE — 80053 COMPREHEN METABOLIC PANEL: CPT

## 2025-04-07 PROCEDURE — 96374 THER/PROPH/DIAG INJ IV PUSH: CPT

## 2025-04-07 RX ORDER — ONDANSETRON 2 MG/ML
4 INJECTION INTRAMUSCULAR; INTRAVENOUS ONCE
Status: COMPLETED | OUTPATIENT
Start: 2025-04-07 | End: 2025-04-07

## 2025-04-07 RX ORDER — ONDANSETRON 4 MG/1
4 TABLET, ORALLY DISINTEGRATING ORAL EVERY 6 HOURS PRN
Qty: 10 TABLET | Refills: 0 | Status: SHIPPED | OUTPATIENT
Start: 2025-04-07 | End: 2025-04-28

## 2025-04-07 RX ORDER — SODIUM CHLORIDE, SODIUM LACTATE, POTASSIUM CHLORIDE, CALCIUM CHLORIDE 600; 310; 30; 20 MG/100ML; MG/100ML; MG/100ML; MG/100ML
INJECTION, SOLUTION INTRAVENOUS ONCE
Status: COMPLETED | OUTPATIENT
Start: 2025-04-07 | End: 2025-04-07

## 2025-04-07 RX ADMIN — SODIUM CHLORIDE, POTASSIUM CHLORIDE, SODIUM LACTATE AND CALCIUM CHLORIDE: 600; 310; 30; 20 INJECTION, SOLUTION INTRAVENOUS at 19:08

## 2025-04-07 RX ADMIN — ONDANSETRON 4 MG: 2 INJECTION INTRAMUSCULAR; INTRAVENOUS at 19:17

## 2025-04-07 ASSESSMENT — FIBROSIS 4 INDEX: FIB4 SCORE: 0.29

## 2025-04-08 NOTE — ED NOTES
Discharge education provided by ERP. Discharge paperwork reviewed with patient. PIV removed from left arm with fully intact catheter, patient tolerated very well. Prescription to be picked up by patient. All questions answered. All belongings with patient. Patient ambulated accompanied by spouse to lobby unassisted with steady gait.

## 2025-04-08 NOTE — DISCHARGE INSTRUCTIONS
You were seen for evaluation of nausea and vomiting.  Your lab tests are reassuring.  You do not have a urinary tract infection.  Please follow-up with your OB/GYN.  Return for persistent vomiting, vaginal bleeding, abdominal pain or any other concerns.

## 2025-04-08 NOTE — ED TRIAGE NOTES
Chief Complaint   Patient presents with    Vomiting     Pt reports 72 hours inability to keep anything down. 8 weeks pregnant.     Pt ambulatory to triage for above complaint. First pregnancy.    Pt is alert & oriented and follows commands. Pt speaking in full sentences and responds appropriately to questions. No acute distress noted in triage. Respirations are even and unlabored. Skin is pink/warm/dry.    Pt placed back in lobby and educated on triage process. Pt encouraged to alert staff to any changes in condition.

## 2025-04-08 NOTE — ED PROVIDER NOTES
ED Provider Note    CHIEF COMPLAINT  Chief Complaint   Patient presents with    Vomiting     Pt reports 72 hours inability to keep anything down. 8 weeks pregnant.       EXTERNAL RECORDS REVIEWED  Outpatient Notes patient follows with vascular medicine anticoagulation clinic as she has history of factor V Leiden and is on chronic Lovenox    HPI/ROS  LIMITATION TO HISTORY   Select: : None  OUTSIDE HISTORIAN(S):  None    Cassie Makenzie See is a 35 y.o.  female at approximately 8 weeks gestation who presents for evaluation of nausea and vomiting which has been ongoing for the past 72 hours.  Patient states that she has been dealing with nausea during this pregnancy.  She is prescribed Reglan by her OB/GYN.  She called them today as she is still feeling nauseated and has only drank 1 metal water bottle of water today.  She was prescribed what she believes to be promethazine but she has not picked up yet.  They advised her to come emergency room for IV fluids.  Patient states that she does want to try Zofran.  She is on Lovenox due to history of factor V Leiden.  She also states that she is on progesterone by her OB/GYN but she is not sure why she is on the progesterone.  She notes that she has been constipated but this is a chronic issue for her.  She has no abdominal pain, vaginal bleeding, painful urination.  She was also told by her OB/GYN last week that she did have a urinary tract infection.  She denies any fevers.  She otherwise has no chronic medical problems.  She did have an ultrasound done at 6 weeks at her OB office and was found to have intrauterine pregnancy.    PAST MEDICAL HISTORY   has a past medical history of Anxiety, Obstetric blood-clot embolism, and Scoliosis.    SURGICAL HISTORY  patient denies any surgical history    FAMILY HISTORY  Family History   Problem Relation Age of Onset    Thyroid Mother     Hypertension Father     Clotting Disorder Neg Hx        SOCIAL HISTORY  Social History  "    Tobacco Use    Smoking status: Never    Smokeless tobacco: Never   Vaping Use    Vaping status: Never Used   Substance and Sexual Activity    Alcohol use: Not Currently     Comment: occ     Drug use: Never    Sexual activity: Not on file       CURRENT MEDICATIONS  Home Medications       Reviewed by Louise Lucio R.N. (Registered Nurse) on 04/07/25 at 1727  Med List Status: Partial     Medication Last Dose Status   azithromycin (ZITHROMAX) 250 MG Tab  Active   enoxaparin (LOVENOX) 40 MG/0.4ML Solution Prefilled Syringe inj  Active                    ALLERGIES  Allergies   Allergen Reactions    Latex Hives and Itching    Lavender Oil        PHYSICAL EXAM  VITAL SIGNS: /83   Pulse 91   Temp 36.4 °C (97.6 °F) (Temporal)   Resp 16   Ht 1.702 m (5' 7\")   Wt 101 kg (222 lb 0.1 oz)   LMP 02/11/2025   SpO2 96%   BMI 34.77 kg/m²      Constitutional: Well developed, Well nourished, No acute respiratory distress, Non-toxic appearance.   HENT: Normocephalic, Atraumatic  Cardiovascular: Normal heart rate, Normal rhythm, No murmurs, No rubs, No gallops.   Thorax & Lungs: Clear to auscultation bilaterally, No respiratory distress, No wheezing.  Abdomen: Soft nontender no rebound masses or peritoneal signs  Skin: Warm, Dry, No erythema, No rash.   Extremities: Intact distal pulses, No edema, No tenderness  Musculoskeletal: Good range of motion in all major joints. Normal gait.  Neurologic: Alert & oriented. No focal deficits noted.   Psych: Alert normal affect     EKG/LABS  Results for orders placed or performed during the hospital encounter of 04/07/25   CBC WITH DIFFERENTIAL    Collection Time: 04/07/25  5:34 PM   Result Value Ref Range    WBC 11.9 (H) 4.8 - 10.8 K/uL    RBC 4.90 4.20 - 5.40 M/uL    Hemoglobin 14.2 12.0 - 16.0 g/dL    Hematocrit 41.5 37.0 - 47.0 %    MCV 84.7 81.4 - 97.8 fL    MCH 29.0 27.0 - 33.0 pg    MCHC 34.2 32.2 - 35.5 g/dL    RDW 37.8 35.9 - 50.0 fL    Platelet Count 451 (H) 164 - 446 " K/uL    MPV 8.6 (L) 9.0 - 12.9 fL    Neutrophils-Polys 78.00 (H) 44.00 - 72.00 %    Lymphocytes 13.80 (L) 22.00 - 41.00 %    Monocytes 7.50 0.00 - 13.40 %    Eosinophils 0.20 0.00 - 6.90 %    Basophils 0.20 0.00 - 1.80 %    Immature Granulocytes 0.30 0.00 - 0.90 %    Nucleated RBC 0.00 0.00 - 0.20 /100 WBC    Neutrophils (Absolute) 9.25 (H) 1.82 - 7.42 K/uL    Lymphs (Absolute) 1.63 1.00 - 4.80 K/uL    Monos (Absolute) 0.89 (H) 0.00 - 0.85 K/uL    Eos (Absolute) 0.02 0.00 - 0.51 K/uL    Baso (Absolute) 0.02 0.00 - 0.12 K/uL    Immature Granulocytes (abs) 0.04 0.00 - 0.11 K/uL    NRBC (Absolute) 0.00 K/uL   COMP METABOLIC PANEL    Collection Time: 04/07/25  5:34 PM   Result Value Ref Range    Sodium 132 (L) 135 - 145 mmol/L    Potassium 3.4 (L) 3.6 - 5.5 mmol/L    Chloride 99 96 - 112 mmol/L    Co2 20 20 - 33 mmol/L    Anion Gap 13.0 7.0 - 16.0    Glucose 76 65 - 99 mg/dL    Bun 8 8 - 22 mg/dL    Creatinine 0.71 0.50 - 1.40 mg/dL    Calcium 9.8 8.5 - 10.5 mg/dL    Correct Calcium 9.5 8.5 - 10.5 mg/dL    AST(SGOT) 31 12 - 45 U/L    ALT(SGPT) 45 2 - 50 U/L    Alkaline Phosphatase 98 30 - 99 U/L    Total Bilirubin 0.5 0.1 - 1.5 mg/dL    Albumin 4.4 3.2 - 4.9 g/dL    Total Protein 7.9 6.0 - 8.2 g/dL    Globulin 3.5 1.9 - 3.5 g/dL    A-G Ratio 1.3 g/dL   LIPASE    Collection Time: 04/07/25  5:34 PM   Result Value Ref Range    Lipase 34 11 - 82 U/L   HCG QUANTITATIVE    Collection Time: 04/07/25  5:34 PM   Result Value Ref Range    Bhcg 22267.0 (H) 0.0 - 5.0 mIU/mL   RH Type for Rhogam from E.D.    Collection Time: 04/07/25  5:34 PM   Result Value Ref Range    Emergency Department Rh Typing POS     Number Of Rh Doses Indicated ZERO    ESTIMATED GFR    Collection Time: 04/07/25  5:34 PM   Result Value Ref Range    GFR (CKD-EPI) 115 >60 mL/min/1.73 m 2   URINALYSIS    Collection Time: 04/07/25  7:42 PM   Result Value Ref Range    Color Yellow     Character Clear     Specific Gravity 1.017 <1.035    Ph 5.5 5.0 - 8.0     Glucose Negative Negative mg/dL    Ketones 80 (A) Negative mg/dL    Protein Negative Negative mg/dL    Bilirubin Negative Negative    Urobilinogen, Urine 1.0 <=1.0 EU/dL    Nitrite Negative Negative    Leukocyte Esterase Negative Negative    Occult Blood Negative Negative    Micro Urine Req see below        I have independently interpreted this EKG      COURSE & MEDICAL DECISION MAKING    ASSESSMENT, COURSE AND PLAN  Care Narrative:   This is a 35-year-old female G1, P0 at approximately 8 weeks gestation who is presenting for evaluation of ongoing nausea and vomiting which she has been experiencing during pregnancy.  She is already trialed Reglan but after she called her OB today and let them know she is only drink approximately a liter of water today they advised her to come to emergency room to be evaluated.  On arrival, her vital signs are stable.  She is not tachycardic or hypotensive.  She does appear well-hydrated on exam.  She is not have any vaginal bleeding or abdominal pain.  She is already had ultrasound at the OB office which demonstrates IUP.    An IV was established and she was given LR bolus.  We did discuss options of antiemetics.  She states that she is already trialed Unisom and vitamin B6.  She does not want to trial Reglan IV.  Did also discuss using Benadryl however patient is concerned about being sleepy from this.  Did discuss option of doing Phenergan as it sounds like her OB/GYN has prescribed her this but patient wishes to proceed with Zofran as she states that this has worked with her previously.  We did discuss hesitance to use this during first trimester due to effects on fetus however I do think that the benefits of patient staying well-hydrated and able to tolerate some food outweigh the risks at this time given patient has already tried other antiemetics including Reglan, Unisom, vitamin B6.  Patient understands risks and wishes to proceed.    Patient was reevaluated after given  Zofran.  She notes that she has an appetite, wants to go home and eat waffles.  Did discuss labs with her which demonstrates no bacteria in her urine or evidence of UTI.  Her kidney function is normal.  There are no significant electrolyte abnormalities.  I did prescribe the patient Zofran.  I did ask her to start with Phenergan first as this was prescribed by her OB/GYN and she has yet to try this.  Patient will return to emergency room for any persistent vomiting, fevers, vaginal bleeding or any other concern.  Patient is agreeable to discharge plan with no further questions.    Hydration: Based on the patient's presentation of Acute Vomiting the patient was given IV fluids. IV Hydration was used because oral hydration was not adequate alone. Upon recheck following hydration, the patient was improved and tolerating PO.          ADDITIONAL PROBLEMS MANAGED  8 weeks gestation -has follow-up with OB in 2 weeks, already on prenatal vitamin, has already had ultrasound demonstrating IUP    DISPOSITION AND DISCUSSIONS  I have discussed management of the patient with the following physicians and ALEXA's:    None    Discussion of management with other QHP or appropriate source(s): None     Escalation of care considered, and ultimately not performed:diagnostic imaging    Barriers to care at this time, including but not limited to:  None .     Decision tools and prescription drugs considered including, but not limited to:  None .    The patient will return for new or worsening symptoms and is stable at the time of discharge.    The patient is referred to a primary physician for blood pressure management, diabetic screening, and for all other preventative health concerns.      DISPOSITION:  Patient will be discharged home in stable condition.    FOLLOW UP:  Darwin Matta M.D.  [address]   [address] [address]  [phone]          [facility], Emergency Dept  [address] [facility]  [address]  [phone]          OUTPATIENT MEDICATIONS:  Discharge Medication List as of 4/7/2025  9:16 PM        START taking these medications    Details   ondansetron (ZOFRAN ODT) 4 MG TABLET DISPERSIBLE Take 1 Tablet by mouth every 6 hours as needed for Nausea/Vomiting., Disp-10 Tablet, R-0, Normal               FINAL DIAGNOSIS  1. Nausea and vomiting, unspecified vomiting type Acute   2. First trimester pregnancy Acute        Electronically signed by: Angy Lares M.D., 4/7/2025 6:59 PM

## 2025-04-08 NOTE — ED NOTES
Rounded with patient, no needs at this time, spouse remains at bedside. Fluid bolus running without issue.

## 2025-04-08 NOTE — ED NOTES
UA collected and sent to lab. Patient denies any needs at this time, fluid bolus running without issue, spouse remains at bedside.

## 2025-04-09 LAB
BACTERIA UR CULT: NORMAL
SIGNIFICANT IND 70042: NORMAL
SITE SITE: NORMAL
SOURCE SOURCE: NORMAL

## 2025-04-15 ENCOUNTER — APPOINTMENT (OUTPATIENT)
Dept: MEDICAL GROUP | Age: 35
End: 2025-04-15
Payer: COMMERCIAL

## 2025-04-28 ENCOUNTER — OFFICE VISIT (OUTPATIENT)
Dept: URGENT CARE | Facility: CLINIC | Age: 35
End: 2025-04-28
Payer: COMMERCIAL

## 2025-04-28 VITALS
OXYGEN SATURATION: 97 % | HEART RATE: 97 BPM | WEIGHT: 220 LBS | DIASTOLIC BLOOD PRESSURE: 80 MMHG | SYSTOLIC BLOOD PRESSURE: 120 MMHG | HEIGHT: 67 IN | TEMPERATURE: 98.2 F | BODY MASS INDEX: 34.53 KG/M2 | RESPIRATION RATE: 16 BRPM

## 2025-04-28 DIAGNOSIS — K59.00 CONSTIPATION, UNSPECIFIED CONSTIPATION TYPE: ICD-10-CM

## 2025-04-28 ASSESSMENT — ENCOUNTER SYMPTOMS
COUGH: 0
MUSCULOSKELETAL NEGATIVE: 1
BLOOD IN STOOL: 0
ABDOMINAL PAIN: 0
CONSTIPATION: 1
NAUSEA: 1
SHORTNESS OF BREATH: 0
CHILLS: 0
DIARRHEA: 0
NEUROLOGICAL NEGATIVE: 1
EYES NEGATIVE: 1
VOMITING: 0
FEVER: 0

## 2025-04-28 ASSESSMENT — FIBROSIS 4 INDEX: FIB4 SCORE: .3381371087926511247

## 2025-04-28 NOTE — PROGRESS NOTES
"Subjective:     Chief Complaint   Patient presents with    Constipation     X 4 days, patient states has not had a bowel movement since Thursday x 4 days ago, is currently pregnant, does have the urge to do poop and cannot, painful in tailbone, nausea       HPI:  Cassie See is a 35 y.o. female who presents for Constipation. LBM was Thursday. She is 11 weeks pregnant. Taking colace daily and took an additional \"colace 2 in one\" yesterday without improvement. Denies abdominal pain. She is passing gas. Reports severe nausea throughout pregnancy. Appetite has been impaired because food does not taste good related to early pregnancy. No changes to nausea or appetite during this episode. Denies fever, chills, abd pain.         ROS:  Review of Systems   Constitutional:  Negative for chills and fever.   HENT: Negative.     Eyes: Negative.    Respiratory:  Negative for cough and shortness of breath.    Cardiovascular:  Negative for chest pain.   Gastrointestinal:  Positive for constipation and nausea. Negative for abdominal pain, blood in stool, diarrhea, melena and vomiting.   Genitourinary: Negative.    Musculoskeletal: Negative.    Skin:  Negative for rash.   Neurological: Negative.    All other systems reviewed and are negative.       CURRENT MEDICATIONS:  Current Outpatient Medications   Medication Sig Refill Last Dispense    enoxaparin (LOVENOX) 40 MG/0.4ML Solution Prefilled Syringe inj Inject 40 mg under the skin every day at 6 PM. 11 4/1/2025       ALLERGIES:   Allergies   Allergen Reactions    Latex Hives and Itching    Lavender Oil        PROBLEM LIST:    does not have any pertinent problems on file.    Allergies, Medications, & Tobacco/Substance Use were reconciled by the Medical Assistant and reviewed by myself.     Objective:   /80 (BP Location: Left arm, Patient Position: Sitting, BP Cuff Size: Large adult)   Pulse 97   Temp 36.8 °C (98.2 °F)   Resp 16   Ht 1.702 m (5' 7\")   Wt 99.8 " kg (220 lb)   LMP 02/11/2025   SpO2 97%   BMI 34.46 kg/m²     Physical Exam  Constitutional:       General: She is not in acute distress.     Appearance: She is not ill-appearing or toxic-appearing.   Cardiovascular:      Rate and Rhythm: Normal rate and regular rhythm.   Pulmonary:      Effort: Pulmonary effort is normal.      Breath sounds: Normal breath sounds.   Abdominal:      General: Abdomen is flat. Bowel sounds are normal. There is no distension.      Palpations: Abdomen is soft.      Tenderness: There is no abdominal tenderness. There is no guarding.   Skin:     General: Skin is warm and dry.   Neurological:      Mental Status: She is alert.         Assessment/Plan:   Pt's history and physical exam consistent with constipation. She is passing gas so I am not concerned for a bowel obstruction at this time. She is 11 weeks pregnant. Pt education on pregnancy safe treatments, such as adding in daily miralax. She will treat symptoms using an enema at home. Discussed proper use. She will f/u with OB for further medication management.   Assessment & Plan  Constipation, unspecified constipation type  PT can take over the counter meds, continue medication until soft, regular BMs:  Stool softener (docusate sodium 100mg) one capsule 2 times daily.  Miralax 1 capful 1-2 times daily until stools become soft.     Increase water, increased fruits and vegetables, increased exercise  Increased daily fiber intake of 20 to 25 g/day is generally recommended          Discussed differential diagnosis, management options, risks/benefits, and alternatives to planned treatment. Pt expressed understanding and the treatment plan was agreed upon. Questions were encouraged and answered. Pt encouraged to return to urgent care as needed if new or worsening symptoms or if there is no improvement in condition. Pt educated in red flags and indications to immediately call 911 or present to the Emergency Department. Advised the patient  to follow-up with the primary care physician for recheck, reevaluation, and further management.    I personally reviewed prior external notes and test results pertinent to today's visit. I have independently reviewed and interpreted all diagnostics ordered during this visit.    Please note that this dictation was created using voice recognition software. I have made a reasonable attempt to correct obvious errors, but I expect that there are errors of grammar and possibly content that I did not discover before finalizing the note.    This note was electronically signed by CHERIE Okeefe

## 2025-05-07 ENCOUNTER — ANCILLARY PROCEDURE (OUTPATIENT)
Dept: MATERNAL FETAL MEDICINE | Facility: MEDICAL CENTER | Age: 35
End: 2025-05-07
Payer: COMMERCIAL

## 2025-05-07 VITALS
SYSTOLIC BLOOD PRESSURE: 113 MMHG | DIASTOLIC BLOOD PRESSURE: 74 MMHG | WEIGHT: 225 LBS | HEART RATE: 90 BPM | BODY MASS INDEX: 35.24 KG/M2

## 2025-05-07 DIAGNOSIS — Z36.0 ENCOUNTER FOR ANTENATAL SCREENING FOR CHROMOSOMAL ANOMALIES: ICD-10-CM

## 2025-05-07 PROCEDURE — 99203 OFFICE O/P NEW LOW 30 MIN: CPT | Performed by: OBSTETRICS & GYNECOLOGY

## 2025-05-07 PROCEDURE — 76813 OB US NUCHAL MEAS 1 GEST: CPT | Performed by: OBSTETRICS & GYNECOLOGY

## 2025-05-07 PROCEDURE — 76801 OB US < 14 WKS SINGLE FETUS: CPT | Performed by: OBSTETRICS & GYNECOLOGY

## 2025-05-07 ASSESSMENT — FIBROSIS 4 INDEX: FIB4 SCORE: .3381371087926511247

## 2025-05-09 ENCOUNTER — TELEPHONE (OUTPATIENT)
Dept: MATERNAL FETAL MEDICINE | Facility: MEDICAL CENTER | Age: 35
End: 2025-05-09
Payer: COMMERCIAL

## 2025-05-12 ENCOUNTER — HOSPITAL ENCOUNTER (OUTPATIENT)
Dept: LAB | Facility: MEDICAL CENTER | Age: 35
End: 2025-05-12
Attending: OBSTETRICS & GYNECOLOGY
Payer: COMMERCIAL

## 2025-05-12 PROCEDURE — 36415 COLL VENOUS BLD VENIPUNCTURE: CPT

## 2025-05-20 DIAGNOSIS — O21.0 HYPEREMESIS GRAVIDARUM: Primary | ICD-10-CM

## 2025-05-20 RX ORDER — DEXTROSE, SODIUM CHLORIDE, SODIUM LACTATE, POTASSIUM CHLORIDE, AND CALCIUM CHLORIDE 5; .6; .31; .03; .02 G/100ML; G/100ML; G/100ML; G/100ML; G/100ML
1000 INJECTION, SOLUTION INTRAVENOUS ONCE
Status: CANCELLED
Start: 2025-05-27 | End: 2025-05-27

## 2025-05-20 RX ORDER — ONDANSETRON 2 MG/ML
4 INJECTION INTRAMUSCULAR; INTRAVENOUS ONCE
Status: CANCELLED
Start: 2025-05-27 | End: 2025-05-27

## 2025-05-21 ENCOUNTER — TELEPHONE (OUTPATIENT)
Dept: MATERNAL FETAL MEDICINE | Facility: MEDICAL CENTER | Age: 35
End: 2025-05-21
Payer: COMMERCIAL

## 2025-05-21 NOTE — TELEPHONE ENCOUNTER
Called patient to relay low risk NIPT results. Asked patient if they would like to know gender and she said no. Patient verbalized understanding no further questions at this time.

## 2025-05-25 ENCOUNTER — OUTPATIENT INFUSION SERVICES (OUTPATIENT)
Dept: ONCOLOGY | Facility: MEDICAL CENTER | Age: 35
End: 2025-05-25
Attending: OBSTETRICS & GYNECOLOGY
Payer: COMMERCIAL

## 2025-05-25 VITALS
RESPIRATION RATE: 18 BRPM | SYSTOLIC BLOOD PRESSURE: 119 MMHG | WEIGHT: 219.8 LBS | TEMPERATURE: 98.5 F | BODY MASS INDEX: 34.5 KG/M2 | OXYGEN SATURATION: 92 % | HEIGHT: 67 IN | HEART RATE: 106 BPM | DIASTOLIC BLOOD PRESSURE: 76 MMHG

## 2025-05-25 DIAGNOSIS — O21.0 HYPEREMESIS GRAVIDARUM: Primary | ICD-10-CM

## 2025-05-25 PROCEDURE — 96361 HYDRATE IV INFUSION ADD-ON: CPT

## 2025-05-25 PROCEDURE — 96374 THER/PROPH/DIAG INJ IV PUSH: CPT

## 2025-05-25 PROCEDURE — 700111 HCHG RX REV CODE 636 W/ 250 OVERRIDE (IP): Mod: JZ | Performed by: OBSTETRICS & GYNECOLOGY

## 2025-05-25 PROCEDURE — 700105 HCHG RX REV CODE 258: Performed by: OBSTETRICS & GYNECOLOGY

## 2025-05-25 RX ORDER — DEXTROSE, SODIUM CHLORIDE, SODIUM LACTATE, POTASSIUM CHLORIDE, AND CALCIUM CHLORIDE 5; .6; .31; .03; .02 G/100ML; G/100ML; G/100ML; G/100ML; G/100ML
1000 INJECTION, SOLUTION INTRAVENOUS ONCE
Status: CANCELLED
Start: 2025-05-26 | End: 2025-05-26

## 2025-05-25 RX ORDER — ONDANSETRON 2 MG/ML
4 INJECTION INTRAMUSCULAR; INTRAVENOUS ONCE
Status: CANCELLED
Start: 2025-05-26 | End: 2025-05-26

## 2025-05-25 RX ORDER — DEXTROSE, SODIUM CHLORIDE, SODIUM LACTATE, POTASSIUM CHLORIDE, AND CALCIUM CHLORIDE 5; .6; .31; .03; .02 G/100ML; G/100ML; G/100ML; G/100ML; G/100ML
1000 INJECTION, SOLUTION INTRAVENOUS ONCE
Status: COMPLETED | OUTPATIENT
Start: 2025-05-25 | End: 2025-05-25

## 2025-05-25 RX ORDER — ONDANSETRON 2 MG/ML
4 INJECTION INTRAMUSCULAR; INTRAVENOUS ONCE
Status: COMPLETED | OUTPATIENT
Start: 2025-05-25 | End: 2025-05-25

## 2025-05-25 RX ADMIN — ONDANSETRON 4 MG: 2 INJECTION INTRAMUSCULAR; INTRAVENOUS at 16:03

## 2025-05-25 RX ADMIN — SODIUM CHLORIDE, SODIUM LACTATE, POTASSIUM CHLORIDE, CALCIUM CHLORIDE AND DEXTROSE MONOHYDRATE 1000 ML: 5; 600; 310; 30; 20 INJECTION, SOLUTION INTRAVENOUS at 16:18

## 2025-05-25 ASSESSMENT — PAIN DESCRIPTION - PAIN TYPE: TYPE: ACUTE PAIN

## 2025-05-25 ASSESSMENT — FIBROSIS 4 INDEX: FIB4 SCORE: .3381371087926511247

## 2025-05-25 NOTE — PROGRESS NOTES
Shantel arrived to the infusion center for hydration for hyperemesis gravidarum. Patient oriented to the unit. Plan of care reviewed, assessment completed. Patient reports nausea and one episode of vomiting today.   PIV started x1 attempt to the L AC, flushed briskly, brisk blood return noted.   Zofran administered per the MAR, patient tolerated well.   D5LR administered per the MAR as ordered over 2 hours without incident.   PIV flushed post infusion, positive blood return noted. PIV removed with tip intact, site dressed with gauze and coban.   Next appointment was confirmed with the patient and she was then discharged home in stable condition.

## 2025-05-28 ENCOUNTER — OUTPATIENT INFUSION SERVICES (OUTPATIENT)
Dept: ONCOLOGY | Facility: MEDICAL CENTER | Age: 35
End: 2025-05-28
Attending: OBSTETRICS & GYNECOLOGY
Payer: COMMERCIAL

## 2025-05-28 VITALS
WEIGHT: 227.74 LBS | HEART RATE: 86 BPM | HEIGHT: 67 IN | OXYGEN SATURATION: 95 % | SYSTOLIC BLOOD PRESSURE: 119 MMHG | BODY MASS INDEX: 35.74 KG/M2 | DIASTOLIC BLOOD PRESSURE: 67 MMHG | RESPIRATION RATE: 16 BRPM | TEMPERATURE: 98.7 F

## 2025-05-28 DIAGNOSIS — O21.0 HYPEREMESIS GRAVIDARUM: Primary | ICD-10-CM

## 2025-05-28 PROCEDURE — 96374 THER/PROPH/DIAG INJ IV PUSH: CPT

## 2025-05-28 PROCEDURE — 700111 HCHG RX REV CODE 636 W/ 250 OVERRIDE (IP): Mod: JZ | Performed by: OBSTETRICS & GYNECOLOGY

## 2025-05-28 PROCEDURE — 96361 HYDRATE IV INFUSION ADD-ON: CPT

## 2025-05-28 PROCEDURE — 700105 HCHG RX REV CODE 258: Performed by: OBSTETRICS & GYNECOLOGY

## 2025-05-28 RX ORDER — DEXTROSE, SODIUM CHLORIDE, SODIUM LACTATE, POTASSIUM CHLORIDE, AND CALCIUM CHLORIDE 5; .6; .31; .03; .02 G/100ML; G/100ML; G/100ML; G/100ML; G/100ML
1000 INJECTION, SOLUTION INTRAVENOUS ONCE
Status: COMPLETED | OUTPATIENT
Start: 2025-05-28 | End: 2025-05-28

## 2025-05-28 RX ORDER — DEXTROSE, SODIUM CHLORIDE, SODIUM LACTATE, POTASSIUM CHLORIDE, AND CALCIUM CHLORIDE 5; .6; .31; .03; .02 G/100ML; G/100ML; G/100ML; G/100ML; G/100ML
1000 INJECTION, SOLUTION INTRAVENOUS ONCE
Status: CANCELLED
Start: 2025-06-01 | End: 2025-06-01

## 2025-05-28 RX ORDER — ONDANSETRON 2 MG/ML
4 INJECTION INTRAMUSCULAR; INTRAVENOUS ONCE
Status: CANCELLED
Start: 2025-06-01 | End: 2025-06-01

## 2025-05-28 RX ORDER — ONDANSETRON 2 MG/ML
4 INJECTION INTRAMUSCULAR; INTRAVENOUS ONCE
Status: COMPLETED | OUTPATIENT
Start: 2025-05-28 | End: 2025-05-28

## 2025-05-28 RX ADMIN — SODIUM CHLORIDE, SODIUM LACTATE, POTASSIUM CHLORIDE, CALCIUM CHLORIDE, AND DEXTROSE MONOHYDRATE 1000 ML: 600; 310; 30; 20; 5 INJECTION, SOLUTION INTRAVENOUS at 16:39

## 2025-05-28 RX ADMIN — ONDANSETRON 4 MG: 2 INJECTION INTRAMUSCULAR; INTRAVENOUS at 16:40

## 2025-05-28 ASSESSMENT — FIBROSIS 4 INDEX: FIB4 SCORE: .3381371087926511247

## 2025-05-29 NOTE — PROGRESS NOTES
Shantel arrives ambulatory to \[facility]\"" for 2  weekly hydration for hyperemesis gravidarum.  PIV placed, flushes easily, brisk blood return observed.  Zofran and hydration administered as ordered, Shantel tolerated well.  PIV flushed and removed, gauze and coban to site.  Discharged in [facility], next appointment confirmed.

## 2025-05-31 ENCOUNTER — OUTPATIENT INFUSION SERVICES (OUTPATIENT)
Dept: ONCOLOGY | Facility: MEDICAL CENTER | Age: 35
End: 2025-05-31
Attending: OBSTETRICS & GYNECOLOGY
Payer: COMMERCIAL

## 2025-05-31 VITALS
HEART RATE: 76 BPM | TEMPERATURE: 97.6 F | SYSTOLIC BLOOD PRESSURE: 108 MMHG | OXYGEN SATURATION: 97 % | RESPIRATION RATE: 17 BRPM | WEIGHT: 225.53 LBS | HEIGHT: 67 IN | BODY MASS INDEX: 35.4 KG/M2 | DIASTOLIC BLOOD PRESSURE: 70 MMHG

## 2025-05-31 DIAGNOSIS — O21.0 HYPEREMESIS GRAVIDARUM: Primary | ICD-10-CM

## 2025-05-31 PROCEDURE — 96374 THER/PROPH/DIAG INJ IV PUSH: CPT

## 2025-05-31 PROCEDURE — 700105 HCHG RX REV CODE 258: Performed by: OBSTETRICS & GYNECOLOGY

## 2025-05-31 PROCEDURE — 96361 HYDRATE IV INFUSION ADD-ON: CPT

## 2025-05-31 PROCEDURE — 700111 HCHG RX REV CODE 636 W/ 250 OVERRIDE (IP): Mod: JZ | Performed by: OBSTETRICS & GYNECOLOGY

## 2025-05-31 RX ORDER — DEXTROSE, SODIUM CHLORIDE, SODIUM LACTATE, POTASSIUM CHLORIDE, AND CALCIUM CHLORIDE 5; .6; .31; .03; .02 G/100ML; G/100ML; G/100ML; G/100ML; G/100ML
1000 INJECTION, SOLUTION INTRAVENOUS ONCE
Status: COMPLETED | OUTPATIENT
Start: 2025-05-31 | End: 2025-05-31

## 2025-05-31 RX ORDER — ONDANSETRON 2 MG/ML
4 INJECTION INTRAMUSCULAR; INTRAVENOUS ONCE
Status: COMPLETED | OUTPATIENT
Start: 2025-05-31 | End: 2025-05-31

## 2025-05-31 RX ORDER — ONDANSETRON 2 MG/ML
4 INJECTION INTRAMUSCULAR; INTRAVENOUS ONCE
Start: 2025-06-01 | End: 2025-06-01

## 2025-05-31 RX ORDER — DEXTROSE, SODIUM CHLORIDE, SODIUM LACTATE, POTASSIUM CHLORIDE, AND CALCIUM CHLORIDE 5; .6; .31; .03; .02 G/100ML; G/100ML; G/100ML; G/100ML; G/100ML
1000 INJECTION, SOLUTION INTRAVENOUS ONCE
Start: 2025-06-01 | End: 2025-06-01

## 2025-05-31 RX ADMIN — ONDANSETRON 4 MG: 2 INJECTION INTRAMUSCULAR; INTRAVENOUS at 09:27

## 2025-05-31 RX ADMIN — SODIUM CHLORIDE, SODIUM LACTATE, POTASSIUM CHLORIDE, CALCIUM CHLORIDE AND DEXTROSE MONOHYDRATE 1000 ML: 5; 600; 310; 30; 20 INJECTION, SOLUTION INTRAVENOUS at 09:22

## 2025-05-31 ASSESSMENT — FIBROSIS 4 INDEX: FIB4 SCORE: .3381371087926511247

## 2025-05-31 NOTE — PROGRESS NOTES
Shantel presents to infusion for 2x/week hydration for hyperemesis gravidarum. Patient reports nausea today.     PIV started with positive blood return.    IV Zofran and hydration administered as ordered, patient tolerated well.    PIV flushed post infusion with positive blood return, d/tramaine with tip intact.    Patient left in stable condition, knows when to return for next appt.

## 2025-06-03 ENCOUNTER — APPOINTMENT (OUTPATIENT)
Dept: LAB | Facility: MEDICAL CENTER | Age: 35
End: 2025-06-03
Payer: COMMERCIAL

## 2025-06-03 PROCEDURE — 36415 COLL VENOUS BLD VENIPUNCTURE: CPT

## 2025-06-03 PROCEDURE — 82105 ALPHA-FETOPROTEIN SERUM: CPT

## 2025-06-04 ENCOUNTER — OUTPATIENT INFUSION SERVICES (OUTPATIENT)
Dept: ONCOLOGY | Facility: MEDICAL CENTER | Age: 35
End: 2025-06-04
Attending: OBSTETRICS & GYNECOLOGY
Payer: COMMERCIAL

## 2025-06-04 VITALS
HEART RATE: 83 BPM | SYSTOLIC BLOOD PRESSURE: 109 MMHG | BODY MASS INDEX: 36.06 KG/M2 | WEIGHT: 229.72 LBS | TEMPERATURE: 98.3 F | RESPIRATION RATE: 18 BRPM | OXYGEN SATURATION: 98 % | HEIGHT: 67 IN | DIASTOLIC BLOOD PRESSURE: 65 MMHG

## 2025-06-04 DIAGNOSIS — O21.0 HYPEREMESIS GRAVIDARUM: Primary | ICD-10-CM

## 2025-06-04 PROCEDURE — 700105 HCHG RX REV CODE 258: Performed by: OBSTETRICS & GYNECOLOGY

## 2025-06-04 PROCEDURE — 700111 HCHG RX REV CODE 636 W/ 250 OVERRIDE (IP): Mod: JZ | Performed by: OBSTETRICS & GYNECOLOGY

## 2025-06-04 PROCEDURE — 96374 THER/PROPH/DIAG INJ IV PUSH: CPT

## 2025-06-04 PROCEDURE — 96361 HYDRATE IV INFUSION ADD-ON: CPT

## 2025-06-04 RX ORDER — DEXTROSE, SODIUM CHLORIDE, SODIUM LACTATE, POTASSIUM CHLORIDE, AND CALCIUM CHLORIDE 5; .6; .31; .03; .02 G/100ML; G/100ML; G/100ML; G/100ML; G/100ML
1000 INJECTION, SOLUTION INTRAVENOUS ONCE
Status: CANCELLED
Start: 2025-06-04 | End: 2025-06-04

## 2025-06-04 RX ORDER — ONDANSETRON 2 MG/ML
4 INJECTION INTRAMUSCULAR; INTRAVENOUS ONCE
Status: CANCELLED
Start: 2025-06-07 | End: 2025-06-07

## 2025-06-04 RX ORDER — DEXTROSE, SODIUM CHLORIDE, SODIUM LACTATE, POTASSIUM CHLORIDE, AND CALCIUM CHLORIDE 5; .6; .31; .03; .02 G/100ML; G/100ML; G/100ML; G/100ML; G/100ML
1000 INJECTION, SOLUTION INTRAVENOUS ONCE
Status: COMPLETED | OUTPATIENT
Start: 2025-06-04 | End: 2025-06-04

## 2025-06-04 RX ORDER — DEXTROSE, SODIUM CHLORIDE, SODIUM LACTATE, POTASSIUM CHLORIDE, AND CALCIUM CHLORIDE 5; .6; .31; .03; .02 G/100ML; G/100ML; G/100ML; G/100ML; G/100ML
1000 INJECTION, SOLUTION INTRAVENOUS ONCE
Status: CANCELLED
Start: 2025-06-07 | End: 2025-06-07

## 2025-06-04 RX ORDER — ONDANSETRON 2 MG/ML
4 INJECTION INTRAMUSCULAR; INTRAVENOUS ONCE
Status: COMPLETED | OUTPATIENT
Start: 2025-06-04 | End: 2025-06-04

## 2025-06-04 RX ADMIN — SODIUM CHLORIDE, SODIUM LACTATE, POTASSIUM CHLORIDE, CALCIUM CHLORIDE AND DEXTROSE MONOHYDRATE 1000 ML: 5; 600; 310; 30; 20 INJECTION, SOLUTION INTRAVENOUS at 16:18

## 2025-06-04 RX ADMIN — ONDANSETRON 4 MG: 2 INJECTION INTRAMUSCULAR; INTRAVENOUS at 16:18

## 2025-06-04 ASSESSMENT — FIBROSIS 4 INDEX: FIB4 SCORE: .3381371087926511247

## 2025-06-04 NOTE — PROGRESS NOTES
Pt arrives to [facility] for D5LR infusion and IV zofran. Pt denies s/s of infection or worsening of symptoms.  22 G PIV established to RAC. D5LR infused in 1 hr per MD order without adverse reaction.  PIV flushed with NS and site removed.  Site wrapped with pressure dressing.  Confirmed next appt time with pt.  Pt dc home to self care.

## 2025-06-06 LAB
# FETUSES US: NORMAL
AFP MOM SERPL: 1.43
AFP SERPL-MCNC: 37 NG/ML
AGE - REPORTED: 33.6 YR
CURRENT SMOKER: NO
FAMILY MEMBER DISEASES HX: NO
GA METHOD: NORMAL
GA: NORMAL WK
IDDM PATIENT QL: NO
INTEGRATED SCN PATIENT-IMP: NORMAL
SPECIMEN DRAWN SERPL: NORMAL

## 2025-06-08 ENCOUNTER — OUTPATIENT INFUSION SERVICES (OUTPATIENT)
Dept: ONCOLOGY | Facility: MEDICAL CENTER | Age: 35
End: 2025-06-08
Attending: OBSTETRICS & GYNECOLOGY
Payer: COMMERCIAL

## 2025-06-08 VITALS
SYSTOLIC BLOOD PRESSURE: 118 MMHG | HEART RATE: 103 BPM | DIASTOLIC BLOOD PRESSURE: 73 MMHG | WEIGHT: 225.09 LBS | RESPIRATION RATE: 18 BRPM | TEMPERATURE: 98.1 F | BODY MASS INDEX: 35.33 KG/M2 | OXYGEN SATURATION: 96 % | HEIGHT: 67 IN

## 2025-06-08 DIAGNOSIS — O21.0 HYPEREMESIS GRAVIDARUM: Primary | ICD-10-CM

## 2025-06-08 PROCEDURE — 96374 THER/PROPH/DIAG INJ IV PUSH: CPT

## 2025-06-08 PROCEDURE — 96361 HYDRATE IV INFUSION ADD-ON: CPT

## 2025-06-08 PROCEDURE — 700111 HCHG RX REV CODE 636 W/ 250 OVERRIDE (IP): Mod: JZ | Performed by: OBSTETRICS & GYNECOLOGY

## 2025-06-08 PROCEDURE — 700105 HCHG RX REV CODE 258: Performed by: OBSTETRICS & GYNECOLOGY

## 2025-06-08 RX ORDER — DEXTROSE, SODIUM CHLORIDE, SODIUM LACTATE, POTASSIUM CHLORIDE, AND CALCIUM CHLORIDE 5; .6; .31; .03; .02 G/100ML; G/100ML; G/100ML; G/100ML; G/100ML
1000 INJECTION, SOLUTION INTRAVENOUS ONCE
Status: COMPLETED | OUTPATIENT
Start: 2025-06-08 | End: 2025-06-08

## 2025-06-08 RX ORDER — DEXTROSE, SODIUM CHLORIDE, SODIUM LACTATE, POTASSIUM CHLORIDE, AND CALCIUM CHLORIDE 5; .6; .31; .03; .02 G/100ML; G/100ML; G/100ML; G/100ML; G/100ML
1000 INJECTION, SOLUTION INTRAVENOUS ONCE
Status: CANCELLED
Start: 2025-06-11 | End: 2025-06-11

## 2025-06-08 RX ORDER — ONDANSETRON 2 MG/ML
4 INJECTION INTRAMUSCULAR; INTRAVENOUS ONCE
Status: CANCELLED
Start: 2025-06-11 | End: 2025-06-11

## 2025-06-08 RX ORDER — ONDANSETRON 2 MG/ML
4 INJECTION INTRAMUSCULAR; INTRAVENOUS ONCE
Status: COMPLETED | OUTPATIENT
Start: 2025-06-08 | End: 2025-06-08

## 2025-06-08 RX ADMIN — SODIUM CHLORIDE, SODIUM LACTATE, POTASSIUM CHLORIDE, CALCIUM CHLORIDE AND DEXTROSE MONOHYDRATE 1000 ML: 5; 600; 310; 30; 20 INJECTION, SOLUTION INTRAVENOUS at 13:46

## 2025-06-08 RX ADMIN — ONDANSETRON 4 MG: 2 INJECTION INTRAMUSCULAR; INTRAVENOUS at 13:49

## 2025-06-08 ASSESSMENT — FIBROSIS 4 INDEX: FIB4 SCORE: .3381371087926511247

## 2025-06-08 NOTE — PROGRESS NOTES
Patient arrived to unit for Hydration with D5LR.    22G IV established in the left AC. Blood return noted. Flushed with 10 mL NS. No erythema, edema, or pain noted.    Zofran administered.    D5LR administered.    Blood return noted from IV. Flushed with 10 mL NS. No erythema, edema, or pain noted. IV removed. Site covered with gauze and coban.    Patient tolerated treatment without any adverse effects. Future appointments confirmed. Patient discharged home in stable condition.

## 2025-06-11 ENCOUNTER — OUTPATIENT INFUSION SERVICES (OUTPATIENT)
Dept: ONCOLOGY | Facility: MEDICAL CENTER | Age: 35
End: 2025-06-11
Attending: OBSTETRICS & GYNECOLOGY
Payer: COMMERCIAL

## 2025-06-11 VITALS
BODY MASS INDEX: 36.09 KG/M2 | HEIGHT: 67 IN | OXYGEN SATURATION: 96 % | DIASTOLIC BLOOD PRESSURE: 77 MMHG | SYSTOLIC BLOOD PRESSURE: 116 MMHG | RESPIRATION RATE: 17 BRPM | TEMPERATURE: 97.8 F | WEIGHT: 229.94 LBS | HEART RATE: 84 BPM

## 2025-06-11 DIAGNOSIS — O21.0 HYPEREMESIS GRAVIDARUM: Primary | ICD-10-CM

## 2025-06-11 PROCEDURE — 700111 HCHG RX REV CODE 636 W/ 250 OVERRIDE (IP): Mod: JZ | Performed by: OBSTETRICS & GYNECOLOGY

## 2025-06-11 PROCEDURE — 96374 THER/PROPH/DIAG INJ IV PUSH: CPT

## 2025-06-11 PROCEDURE — 700105 HCHG RX REV CODE 258: Performed by: OBSTETRICS & GYNECOLOGY

## 2025-06-11 PROCEDURE — 96361 HYDRATE IV INFUSION ADD-ON: CPT

## 2025-06-11 RX ORDER — DEXTROSE, SODIUM CHLORIDE, SODIUM LACTATE, POTASSIUM CHLORIDE, AND CALCIUM CHLORIDE 5; .6; .31; .03; .02 G/100ML; G/100ML; G/100ML; G/100ML; G/100ML
1000 INJECTION, SOLUTION INTRAVENOUS ONCE
Status: COMPLETED | OUTPATIENT
Start: 2025-06-11 | End: 2025-06-11

## 2025-06-11 RX ORDER — ONDANSETRON 2 MG/ML
4 INJECTION INTRAMUSCULAR; INTRAVENOUS ONCE
Status: CANCELLED
Start: 2025-06-15 | End: 2025-06-15

## 2025-06-11 RX ORDER — ONDANSETRON 2 MG/ML
4 INJECTION INTRAMUSCULAR; INTRAVENOUS ONCE
Status: COMPLETED | OUTPATIENT
Start: 2025-06-11 | End: 2025-06-11

## 2025-06-11 RX ORDER — DEXTROSE, SODIUM CHLORIDE, SODIUM LACTATE, POTASSIUM CHLORIDE, AND CALCIUM CHLORIDE 5; .6; .31; .03; .02 G/100ML; G/100ML; G/100ML; G/100ML; G/100ML
1000 INJECTION, SOLUTION INTRAVENOUS ONCE
Status: CANCELLED
Start: 2025-06-15 | End: 2025-06-15

## 2025-06-11 RX ADMIN — ONDANSETRON 4 MG: 2 INJECTION INTRAMUSCULAR; INTRAVENOUS at 16:40

## 2025-06-11 RX ADMIN — SODIUM CHLORIDE, SODIUM LACTATE, POTASSIUM CHLORIDE, CALCIUM CHLORIDE AND DEXTROSE MONOHYDRATE 1000 ML: 5; 600; 310; 30; 20 INJECTION, SOLUTION INTRAVENOUS at 16:43

## 2025-06-11 ASSESSMENT — FIBROSIS 4 INDEX: FIB4 SCORE: .3381371087926511247

## 2025-06-11 ASSESSMENT — PAIN DESCRIPTION - PAIN TYPE: TYPE: ACUTE PAIN

## 2025-06-11 NOTE — PROGRESS NOTES
Shantel arrived to the infusion center for hydration for hyperemesis gravidarum. Patient oriented to the unit. Plan of care reviewed, assessment completed. Patient reports nausea, denies any episodes of emesis today.   PIV started x1 attempt to the Right AC, flushed briskly, brisk blood return noted.   Zofran administered per the MAR, patient tolerated well.   D5LR administered per the MAR over one hour.   PIV flushed post infusion, positive blood return noted. PIV removed with tip intact, site dressed with gauze and coban.   Next appointment was confirmed with the patient and she was then discharged home in stable condition.

## 2025-06-15 ENCOUNTER — OUTPATIENT INFUSION SERVICES (OUTPATIENT)
Dept: ONCOLOGY | Facility: MEDICAL CENTER | Age: 35
End: 2025-06-15
Attending: OBSTETRICS & GYNECOLOGY
Payer: COMMERCIAL

## 2025-06-15 VITALS
SYSTOLIC BLOOD PRESSURE: 117 MMHG | DIASTOLIC BLOOD PRESSURE: 70 MMHG | HEIGHT: 67 IN | WEIGHT: 229.5 LBS | OXYGEN SATURATION: 98 % | HEART RATE: 90 BPM | RESPIRATION RATE: 17 BRPM | BODY MASS INDEX: 36.02 KG/M2 | TEMPERATURE: 97.6 F

## 2025-06-15 DIAGNOSIS — O21.0 HYPEREMESIS GRAVIDARUM: Primary | ICD-10-CM

## 2025-06-15 PROCEDURE — 96374 THER/PROPH/DIAG INJ IV PUSH: CPT

## 2025-06-15 PROCEDURE — 700111 HCHG RX REV CODE 636 W/ 250 OVERRIDE (IP): Mod: JZ | Performed by: OBSTETRICS & GYNECOLOGY

## 2025-06-15 PROCEDURE — 96361 HYDRATE IV INFUSION ADD-ON: CPT

## 2025-06-15 PROCEDURE — 700105 HCHG RX REV CODE 258: Performed by: OBSTETRICS & GYNECOLOGY

## 2025-06-15 RX ORDER — ONDANSETRON 2 MG/ML
4 INJECTION INTRAMUSCULAR; INTRAVENOUS ONCE
Status: CANCELLED
Start: 2025-06-18 | End: 2025-06-18

## 2025-06-15 RX ORDER — DEXTROSE, SODIUM CHLORIDE, SODIUM LACTATE, POTASSIUM CHLORIDE, AND CALCIUM CHLORIDE 5; .6; .31; .03; .02 G/100ML; G/100ML; G/100ML; G/100ML; G/100ML
1000 INJECTION, SOLUTION INTRAVENOUS ONCE
Status: CANCELLED
Start: 2025-06-18 | End: 2025-06-18

## 2025-06-15 RX ORDER — DEXTROSE, SODIUM CHLORIDE, SODIUM LACTATE, POTASSIUM CHLORIDE, AND CALCIUM CHLORIDE 5; .6; .31; .03; .02 G/100ML; G/100ML; G/100ML; G/100ML; G/100ML
1000 INJECTION, SOLUTION INTRAVENOUS ONCE
Status: COMPLETED | OUTPATIENT
Start: 2025-06-15 | End: 2025-06-15

## 2025-06-15 RX ORDER — ONDANSETRON 2 MG/ML
4 INJECTION INTRAMUSCULAR; INTRAVENOUS ONCE
Status: COMPLETED | OUTPATIENT
Start: 2025-06-15 | End: 2025-06-15

## 2025-06-15 RX ADMIN — SODIUM CHLORIDE, SODIUM LACTATE, POTASSIUM CHLORIDE, CALCIUM CHLORIDE AND DEXTROSE MONOHYDRATE 1000 ML: 5; 600; 310; 30; 20 INJECTION, SOLUTION INTRAVENOUS at 13:46

## 2025-06-15 RX ADMIN — ONDANSETRON 4 MG: 2 INJECTION INTRAMUSCULAR; INTRAVENOUS at 13:41

## 2025-06-15 ASSESSMENT — FIBROSIS 4 INDEX: FIB4 SCORE: .3381371087926511247

## 2025-06-15 ASSESSMENT — PAIN DESCRIPTION - PAIN TYPE: TYPE: ACUTE PAIN

## 2025-06-15 NOTE — PROGRESS NOTES
Shantel arrived to the infusion center for hydration for hyperemesis gravidarum.  Plan of care reviewed, assessment completed. Patient reports nausea, denies any episodes of emesis today, denies any acute changes.  PIV started x1 attempt to the left AC, flushed briskly, brisk blood return noted.   Zofran administered per the MAR, patient tolerated well.   D5LR administered per the MAR over one hour.   PIV flushed post infusion, positive blood return noted. PIV removed with tip intact, site dressed with gauze and coban.   Next appointment was confirmed with the patient and she was then discharged home in stable condition.

## 2025-06-18 ENCOUNTER — OUTPATIENT INFUSION SERVICES (OUTPATIENT)
Dept: ONCOLOGY | Facility: MEDICAL CENTER | Age: 35
End: 2025-06-18
Attending: OBSTETRICS & GYNECOLOGY
Payer: COMMERCIAL

## 2025-06-18 VITALS
OXYGEN SATURATION: 96 % | HEIGHT: 67 IN | HEART RATE: 93 BPM | BODY MASS INDEX: 37.06 KG/M2 | DIASTOLIC BLOOD PRESSURE: 71 MMHG | RESPIRATION RATE: 16 BRPM | TEMPERATURE: 98.7 F | SYSTOLIC BLOOD PRESSURE: 117 MMHG | WEIGHT: 236.11 LBS

## 2025-06-18 DIAGNOSIS — O21.0 HYPEREMESIS GRAVIDARUM: Primary | ICD-10-CM

## 2025-06-18 PROCEDURE — 96361 HYDRATE IV INFUSION ADD-ON: CPT

## 2025-06-18 PROCEDURE — 96374 THER/PROPH/DIAG INJ IV PUSH: CPT

## 2025-06-18 PROCEDURE — 700105 HCHG RX REV CODE 258: Performed by: OBSTETRICS & GYNECOLOGY

## 2025-06-18 PROCEDURE — 700111 HCHG RX REV CODE 636 W/ 250 OVERRIDE (IP): Mod: JZ | Performed by: OBSTETRICS & GYNECOLOGY

## 2025-06-18 RX ORDER — ONDANSETRON 2 MG/ML
4 INJECTION INTRAMUSCULAR; INTRAVENOUS ONCE
Status: CANCELLED
Start: 2025-06-22 | End: 2025-06-22

## 2025-06-18 RX ORDER — ONDANSETRON 2 MG/ML
4 INJECTION INTRAMUSCULAR; INTRAVENOUS ONCE
Status: COMPLETED | OUTPATIENT
Start: 2025-06-18 | End: 2025-06-18

## 2025-06-18 RX ORDER — DEXTROSE, SODIUM CHLORIDE, SODIUM LACTATE, POTASSIUM CHLORIDE, AND CALCIUM CHLORIDE 5; .6; .31; .03; .02 G/100ML; G/100ML; G/100ML; G/100ML; G/100ML
1000 INJECTION, SOLUTION INTRAVENOUS ONCE
Status: CANCELLED
Start: 2025-06-22 | End: 2025-06-22

## 2025-06-18 RX ORDER — DEXTROSE, SODIUM CHLORIDE, SODIUM LACTATE, POTASSIUM CHLORIDE, AND CALCIUM CHLORIDE 5; .6; .31; .03; .02 G/100ML; G/100ML; G/100ML; G/100ML; G/100ML
1000 INJECTION, SOLUTION INTRAVENOUS ONCE
Status: COMPLETED | OUTPATIENT
Start: 2025-06-18 | End: 2025-06-18

## 2025-06-18 RX ADMIN — ONDANSETRON 4 MG: 2 INJECTION INTRAMUSCULAR; INTRAVENOUS at 16:36

## 2025-06-18 RX ADMIN — SODIUM CHLORIDE, SODIUM LACTATE, POTASSIUM CHLORIDE, CALCIUM CHLORIDE AND DEXTROSE MONOHYDRATE 1000 ML: 5; 600; 310; 30; 20 INJECTION, SOLUTION INTRAVENOUS at 16:36

## 2025-06-18 ASSESSMENT — FIBROSIS 4 INDEX: FIB4 SCORE: .3381371087926511247

## 2025-06-19 NOTE — PROGRESS NOTES
Shantel presents to infusion for 2x/week hydration for hyperemesis gravidarum. Patient reports nausea today.      PIV started with positive blood return.     IV Zofran and hydration administered as ordered, patient tolerated well.     PIV flushed post infusion with positive blood return, removed with tip intact.     Patient left in stable condition, knows when to return for next appointment.

## 2025-06-22 ENCOUNTER — OUTPATIENT INFUSION SERVICES (OUTPATIENT)
Dept: ONCOLOGY | Facility: MEDICAL CENTER | Age: 35
End: 2025-06-22
Attending: OBSTETRICS & GYNECOLOGY
Payer: COMMERCIAL

## 2025-06-22 VITALS
RESPIRATION RATE: 17 BRPM | SYSTOLIC BLOOD PRESSURE: 108 MMHG | DIASTOLIC BLOOD PRESSURE: 70 MMHG | WEIGHT: 235.01 LBS | HEART RATE: 85 BPM | OXYGEN SATURATION: 96 % | TEMPERATURE: 98.1 F | HEIGHT: 67 IN | BODY MASS INDEX: 36.89 KG/M2

## 2025-06-22 DIAGNOSIS — O21.0 HYPEREMESIS GRAVIDARUM: Primary | ICD-10-CM

## 2025-06-22 PROCEDURE — 700111 HCHG RX REV CODE 636 W/ 250 OVERRIDE (IP): Mod: JZ | Performed by: OBSTETRICS & GYNECOLOGY

## 2025-06-22 PROCEDURE — 96374 THER/PROPH/DIAG INJ IV PUSH: CPT

## 2025-06-22 PROCEDURE — 96361 HYDRATE IV INFUSION ADD-ON: CPT

## 2025-06-22 PROCEDURE — 700105 HCHG RX REV CODE 258: Performed by: OBSTETRICS & GYNECOLOGY

## 2025-06-22 RX ORDER — DEXTROSE, SODIUM CHLORIDE, SODIUM LACTATE, POTASSIUM CHLORIDE, AND CALCIUM CHLORIDE 5; .6; .31; .03; .02 G/100ML; G/100ML; G/100ML; G/100ML; G/100ML
1000 INJECTION, SOLUTION INTRAVENOUS ONCE
Status: COMPLETED | OUTPATIENT
Start: 2025-06-22 | End: 2025-06-22

## 2025-06-22 RX ORDER — ONDANSETRON 2 MG/ML
4 INJECTION INTRAMUSCULAR; INTRAVENOUS ONCE
Status: COMPLETED | OUTPATIENT
Start: 2025-06-22 | End: 2025-06-22

## 2025-06-22 RX ORDER — ONDANSETRON 2 MG/ML
4 INJECTION INTRAMUSCULAR; INTRAVENOUS ONCE
Status: CANCELLED
Start: 2025-06-25 | End: 2025-06-25

## 2025-06-22 RX ORDER — DEXTROSE, SODIUM CHLORIDE, SODIUM LACTATE, POTASSIUM CHLORIDE, AND CALCIUM CHLORIDE 5; .6; .31; .03; .02 G/100ML; G/100ML; G/100ML; G/100ML; G/100ML
1000 INJECTION, SOLUTION INTRAVENOUS ONCE
Status: CANCELLED
Start: 2025-06-25 | End: 2025-06-25

## 2025-06-22 RX ADMIN — ONDANSETRON 4 MG: 2 INJECTION INTRAMUSCULAR; INTRAVENOUS at 13:43

## 2025-06-22 RX ADMIN — SODIUM CHLORIDE, SODIUM LACTATE, POTASSIUM CHLORIDE, CALCIUM CHLORIDE AND DEXTROSE MONOHYDRATE 1000 ML: 5; 600; 310; 30; 20 INJECTION, SOLUTION INTRAVENOUS at 13:42

## 2025-06-22 ASSESSMENT — FIBROSIS 4 INDEX: FIB4 SCORE: .3381371087926511247

## 2025-06-22 NOTE — PROGRESS NOTES
Pt arrived to [facility], ambulatory, for hydration/anti-emetics. Pt with ongoing nausea, denies vomiting today. 24g PIV established in the L-AC, positive blood return noted. Zofran administered with no complications. 1L D5LR infused with no complications. PIV flushed and removed. Pt left IS with no s/sx of distress. Follow up appointment confirmed.

## 2025-06-25 ENCOUNTER — APPOINTMENT (OUTPATIENT)
Dept: ONCOLOGY | Facility: MEDICAL CENTER | Age: 35
End: 2025-06-25
Attending: OBSTETRICS & GYNECOLOGY
Payer: COMMERCIAL

## 2025-06-28 ENCOUNTER — OUTPATIENT INFUSION SERVICES (OUTPATIENT)
Dept: ONCOLOGY | Facility: MEDICAL CENTER | Age: 35
End: 2025-06-28
Attending: OBSTETRICS & GYNECOLOGY
Payer: COMMERCIAL

## 2025-06-28 VITALS
BODY MASS INDEX: 37.37 KG/M2 | HEIGHT: 67 IN | WEIGHT: 238.1 LBS | TEMPERATURE: 98.1 F | SYSTOLIC BLOOD PRESSURE: 123 MMHG | HEART RATE: 100 BPM | OXYGEN SATURATION: 100 % | RESPIRATION RATE: 20 BRPM | DIASTOLIC BLOOD PRESSURE: 69 MMHG

## 2025-06-28 DIAGNOSIS — O21.0 HYPEREMESIS GRAVIDARUM: Primary | ICD-10-CM

## 2025-06-28 PROCEDURE — 700111 HCHG RX REV CODE 636 W/ 250 OVERRIDE (IP): Mod: JZ | Performed by: OBSTETRICS & GYNECOLOGY

## 2025-06-28 PROCEDURE — 700105 HCHG RX REV CODE 258: Performed by: OBSTETRICS & GYNECOLOGY

## 2025-06-28 PROCEDURE — 96361 HYDRATE IV INFUSION ADD-ON: CPT

## 2025-06-28 PROCEDURE — 96374 THER/PROPH/DIAG INJ IV PUSH: CPT

## 2025-06-28 RX ORDER — DEXTROSE, SODIUM CHLORIDE, SODIUM LACTATE, POTASSIUM CHLORIDE, AND CALCIUM CHLORIDE 5; .6; .31; .03; .02 G/100ML; G/100ML; G/100ML; G/100ML; G/100ML
1000 INJECTION, SOLUTION INTRAVENOUS ONCE
Start: 2025-06-29 | End: 2025-06-29

## 2025-06-28 RX ORDER — ONDANSETRON 2 MG/ML
4 INJECTION INTRAMUSCULAR; INTRAVENOUS ONCE
Status: COMPLETED | OUTPATIENT
Start: 2025-06-28 | End: 2025-06-28

## 2025-06-28 RX ORDER — ONDANSETRON 2 MG/ML
4 INJECTION INTRAMUSCULAR; INTRAVENOUS ONCE
Start: 2025-06-29 | End: 2025-06-29

## 2025-06-28 RX ORDER — DEXTROSE, SODIUM CHLORIDE, SODIUM LACTATE, POTASSIUM CHLORIDE, AND CALCIUM CHLORIDE 5; .6; .31; .03; .02 G/100ML; G/100ML; G/100ML; G/100ML; G/100ML
1000 INJECTION, SOLUTION INTRAVENOUS ONCE
Status: COMPLETED | OUTPATIENT
Start: 2025-06-28 | End: 2025-06-28

## 2025-06-28 RX ADMIN — ONDANSETRON 4 MG: 2 INJECTION INTRAMUSCULAR; INTRAVENOUS at 13:42

## 2025-06-28 RX ADMIN — SODIUM CHLORIDE, SODIUM LACTATE, POTASSIUM CHLORIDE, CALCIUM CHLORIDE AND DEXTROSE MONOHYDRATE 1000 ML: 5; 600; 310; 30; 20 INJECTION, SOLUTION INTRAVENOUS at 13:41

## 2025-06-28 ASSESSMENT — FIBROSIS 4 INDEX: FIB4 SCORE: .3381371087926511247

## 2025-06-28 NOTE — PROGRESS NOTES
Pt arrived ambulatory to \[facility]\"" for 2/times weekly IV hydration for hyperemesis gravidarum. POC discussed with pt and she agrees with plan.    Pt reports nausea today. PIV established, brisk blood return noted. Pt medicated per MAR with Zofran. 1 liter D5LR infused over 1 hour. Pt tolerated treatment without s/s adverse reaction. PIV dc'd catheter tip intact, gauze and coban dressing applied.     Pt discharged to self care, [facility]. Pt's next appointment confirmed 7/2/2025.

## 2025-07-02 ENCOUNTER — OUTPATIENT INFUSION SERVICES (OUTPATIENT)
Dept: ONCOLOGY | Facility: MEDICAL CENTER | Age: 35
End: 2025-07-02
Attending: OBSTETRICS & GYNECOLOGY
Payer: COMMERCIAL

## 2025-07-02 VITALS
DIASTOLIC BLOOD PRESSURE: 66 MMHG | BODY MASS INDEX: 37.65 KG/M2 | SYSTOLIC BLOOD PRESSURE: 112 MMHG | HEART RATE: 98 BPM | RESPIRATION RATE: 16 BRPM | HEIGHT: 67 IN | WEIGHT: 239.86 LBS | OXYGEN SATURATION: 97 % | TEMPERATURE: 98.6 F

## 2025-07-02 DIAGNOSIS — O21.0 HYPEREMESIS GRAVIDARUM: Primary | ICD-10-CM

## 2025-07-02 PROCEDURE — 96361 HYDRATE IV INFUSION ADD-ON: CPT

## 2025-07-02 PROCEDURE — 700111 HCHG RX REV CODE 636 W/ 250 OVERRIDE (IP): Mod: JZ | Performed by: OBSTETRICS & GYNECOLOGY

## 2025-07-02 PROCEDURE — 700105 HCHG RX REV CODE 258: Performed by: OBSTETRICS & GYNECOLOGY

## 2025-07-02 PROCEDURE — 96374 THER/PROPH/DIAG INJ IV PUSH: CPT

## 2025-07-02 RX ORDER — DEXTROSE, SODIUM CHLORIDE, SODIUM LACTATE, POTASSIUM CHLORIDE, AND CALCIUM CHLORIDE 5; .6; .31; .03; .02 G/100ML; G/100ML; G/100ML; G/100ML; G/100ML
1000 INJECTION, SOLUTION INTRAVENOUS ONCE
Status: COMPLETED | OUTPATIENT
Start: 2025-07-02 | End: 2025-07-02

## 2025-07-02 RX ORDER — ONDANSETRON 2 MG/ML
4 INJECTION INTRAMUSCULAR; INTRAVENOUS ONCE
Status: CANCELLED
Start: 2025-07-05 | End: 2025-07-05

## 2025-07-02 RX ORDER — DEXTROSE, SODIUM CHLORIDE, SODIUM LACTATE, POTASSIUM CHLORIDE, AND CALCIUM CHLORIDE 5; .6; .31; .03; .02 G/100ML; G/100ML; G/100ML; G/100ML; G/100ML
1000 INJECTION, SOLUTION INTRAVENOUS ONCE
Status: CANCELLED
Start: 2025-07-05 | End: 2025-07-05

## 2025-07-02 RX ORDER — ONDANSETRON 2 MG/ML
4 INJECTION INTRAMUSCULAR; INTRAVENOUS ONCE
Status: COMPLETED | OUTPATIENT
Start: 2025-07-02 | End: 2025-07-02

## 2025-07-02 RX ADMIN — SODIUM CHLORIDE, SODIUM LACTATE, POTASSIUM CHLORIDE, CALCIUM CHLORIDE AND DEXTROSE MONOHYDRATE 1000 ML: 5; 600; 310; 30; 20 INJECTION, SOLUTION INTRAVENOUS at 16:41

## 2025-07-02 RX ADMIN — ONDANSETRON 4 MG: 2 INJECTION INTRAMUSCULAR; INTRAVENOUS at 16:41

## 2025-07-02 ASSESSMENT — FIBROSIS 4 INDEX: FIB4 SCORE: .3381371087926511247

## 2025-07-03 NOTE — PROGRESS NOTES
Shantel presents to infusion for 2x/week hydration for hyperemesis gravidarum. Discussed POC, PIV started with positive blood return. Pt endorses nausea today.    Zofran administered per MAR. 1L D5LR infused over 1 hour. Pt tolerated well without s/sx adverse reaction. PIV flushed post-infusion with positive blood return, d/c'd with tip intact, pressure dressing applied.     Pt left in stable condition, NAD, knows when to return for next appt - 7/6/25.

## 2025-07-06 ENCOUNTER — OUTPATIENT INFUSION SERVICES (OUTPATIENT)
Dept: ONCOLOGY | Facility: MEDICAL CENTER | Age: 35
End: 2025-07-06
Attending: OBSTETRICS & GYNECOLOGY
Payer: COMMERCIAL

## 2025-07-06 VITALS
TEMPERATURE: 97.9 F | SYSTOLIC BLOOD PRESSURE: 112 MMHG | BODY MASS INDEX: 37.85 KG/M2 | DIASTOLIC BLOOD PRESSURE: 76 MMHG | WEIGHT: 241.18 LBS | HEIGHT: 67 IN | HEART RATE: 93 BPM | OXYGEN SATURATION: 96 % | RESPIRATION RATE: 17 BRPM

## 2025-07-06 DIAGNOSIS — O21.0 HYPEREMESIS GRAVIDARUM: Primary | ICD-10-CM

## 2025-07-06 PROCEDURE — 700111 HCHG RX REV CODE 636 W/ 250 OVERRIDE (IP): Mod: JZ | Performed by: OBSTETRICS & GYNECOLOGY

## 2025-07-06 PROCEDURE — 96374 THER/PROPH/DIAG INJ IV PUSH: CPT

## 2025-07-06 PROCEDURE — 96361 HYDRATE IV INFUSION ADD-ON: CPT

## 2025-07-06 PROCEDURE — 700105 HCHG RX REV CODE 258: Performed by: OBSTETRICS & GYNECOLOGY

## 2025-07-06 RX ORDER — DEXTROSE, SODIUM CHLORIDE, SODIUM LACTATE, POTASSIUM CHLORIDE, AND CALCIUM CHLORIDE 5; .6; .31; .03; .02 G/100ML; G/100ML; G/100ML; G/100ML; G/100ML
1000 INJECTION, SOLUTION INTRAVENOUS ONCE
Status: COMPLETED | OUTPATIENT
Start: 2025-07-06 | End: 2025-07-06

## 2025-07-06 RX ORDER — DEXTROSE, SODIUM CHLORIDE, SODIUM LACTATE, POTASSIUM CHLORIDE, AND CALCIUM CHLORIDE 5; .6; .31; .03; .02 G/100ML; G/100ML; G/100ML; G/100ML; G/100ML
1000 INJECTION, SOLUTION INTRAVENOUS ONCE
Status: CANCELLED
Start: 2025-07-09 | End: 2025-07-09

## 2025-07-06 RX ORDER — ONDANSETRON 2 MG/ML
4 INJECTION INTRAMUSCULAR; INTRAVENOUS ONCE
Status: CANCELLED
Start: 2025-07-09 | End: 2025-07-09

## 2025-07-06 RX ORDER — ONDANSETRON 2 MG/ML
4 INJECTION INTRAMUSCULAR; INTRAVENOUS ONCE
Status: COMPLETED | OUTPATIENT
Start: 2025-07-06 | End: 2025-07-06

## 2025-07-06 RX ADMIN — ONDANSETRON 4 MG: 2 INJECTION INTRAMUSCULAR; INTRAVENOUS at 13:59

## 2025-07-06 RX ADMIN — SODIUM CHLORIDE, SODIUM LACTATE, POTASSIUM CHLORIDE, CALCIUM CHLORIDE AND DEXTROSE MONOHYDRATE 1000 ML: 5; 600; 310; 30; 20 INJECTION, SOLUTION INTRAVENOUS at 13:58

## 2025-07-06 ASSESSMENT — FIBROSIS 4 INDEX: FIB4 SCORE: .3381371087926511247

## 2025-07-06 NOTE — PROGRESS NOTES
Pt arrived ambulatory to infusion for hydration/anti-emetics. Pt with ongoing nausea, denies vomiting today. 22g PIV established in the L-AC, positive blood return noted. Zofran administered with no complications. 1L D5LR infused with no complications. PIV flushed and removed. Pt left IS with no s/sx of distress. Follow up appointment confirmed.

## 2025-07-07 ENCOUNTER — ANCILLARY PROCEDURE (OUTPATIENT)
Dept: MATERNAL FETAL MEDICINE | Facility: MEDICAL CENTER | Age: 35
End: 2025-07-07
Attending: OBSTETRICS & GYNECOLOGY
Payer: COMMERCIAL

## 2025-07-07 VITALS
DIASTOLIC BLOOD PRESSURE: 75 MMHG | HEART RATE: 84 BPM | SYSTOLIC BLOOD PRESSURE: 120 MMHG | BODY MASS INDEX: 38.49 KG/M2 | WEIGHT: 245.2 LBS

## 2025-07-07 DIAGNOSIS — Z36.0 ENCOUNTER FOR ANTENATAL SCREENING FOR CHROMOSOMAL ANOMALIES: ICD-10-CM

## 2025-07-07 DIAGNOSIS — Z86.718 HISTORY OF THROMBOSIS: Primary | ICD-10-CM

## 2025-07-07 DIAGNOSIS — Z3A.20 20 WEEKS GESTATION OF PREGNANCY: ICD-10-CM

## 2025-07-07 DIAGNOSIS — O99.112 HETEROZYGOUS FACTOR V LEIDEN AFFECTING PREGNANCY IN SECOND TRIMESTER, ANTEPARTUM (HCC): ICD-10-CM

## 2025-07-07 DIAGNOSIS — D68.51 HETEROZYGOUS FACTOR V LEIDEN AFFECTING PREGNANCY IN SECOND TRIMESTER, ANTEPARTUM (HCC): ICD-10-CM

## 2025-07-07 PROCEDURE — 76817 TRANSVAGINAL US OBSTETRIC: CPT | Performed by: OBSTETRICS & GYNECOLOGY

## 2025-07-07 PROCEDURE — 76811 OB US DETAILED SNGL FETUS: CPT | Performed by: OBSTETRICS & GYNECOLOGY

## 2025-07-07 ASSESSMENT — FIBROSIS 4 INDEX: FIB4 SCORE: .3381371087926511247

## 2025-07-08 ENCOUNTER — TELEPHONE (OUTPATIENT)
Dept: VASCULAR LAB | Facility: MEDICAL CENTER | Age: 35
End: 2025-07-08
Payer: COMMERCIAL

## 2025-07-08 DIAGNOSIS — Z79.01 CHRONIC ANTICOAGULATION: Primary | ICD-10-CM

## 2025-07-08 DIAGNOSIS — I82.890 ACUTE DEEP VEIN THROMBOSIS OF PELVIC VEIN: ICD-10-CM

## 2025-07-08 NOTE — TELEPHONE ENCOUNTER
Established patient  Chart prep for upcoming appointment.    Any pending/incomplete orders from last visit? No, all orders completed.  Was patient called and reminded to complete pending orders? N/A orders complete  Were any records requested?  No    Referral up to date? Yes renewal ordered, sent to provider to co-sign, AND new referral attached to upcoming appointment.    Referral attached to appointment (renewals and New patients only)? Yes  Virtual appointment? No

## 2025-07-09 ENCOUNTER — APPOINTMENT (OUTPATIENT)
Dept: ONCOLOGY | Facility: MEDICAL CENTER | Age: 35
End: 2025-07-09
Attending: OBSTETRICS & GYNECOLOGY
Payer: COMMERCIAL

## 2025-07-13 ENCOUNTER — OUTPATIENT INFUSION SERVICES (OUTPATIENT)
Dept: ONCOLOGY | Facility: MEDICAL CENTER | Age: 35
End: 2025-07-13
Attending: OBSTETRICS & GYNECOLOGY
Payer: COMMERCIAL

## 2025-07-13 VITALS
HEART RATE: 117 BPM | DIASTOLIC BLOOD PRESSURE: 69 MMHG | RESPIRATION RATE: 16 BRPM | SYSTOLIC BLOOD PRESSURE: 112 MMHG | WEIGHT: 243.83 LBS | OXYGEN SATURATION: 97 % | HEIGHT: 67 IN | BODY MASS INDEX: 38.27 KG/M2 | TEMPERATURE: 97.4 F

## 2025-07-13 DIAGNOSIS — O21.0 HYPEREMESIS GRAVIDARUM: Primary | ICD-10-CM

## 2025-07-13 PROCEDURE — 700111 HCHG RX REV CODE 636 W/ 250 OVERRIDE (IP): Mod: JZ | Performed by: OBSTETRICS & GYNECOLOGY

## 2025-07-13 PROCEDURE — 96374 THER/PROPH/DIAG INJ IV PUSH: CPT

## 2025-07-13 PROCEDURE — 96361 HYDRATE IV INFUSION ADD-ON: CPT

## 2025-07-13 PROCEDURE — 700105 HCHG RX REV CODE 258: Performed by: OBSTETRICS & GYNECOLOGY

## 2025-07-13 RX ORDER — DEXTROSE, SODIUM CHLORIDE, SODIUM LACTATE, POTASSIUM CHLORIDE, AND CALCIUM CHLORIDE 5; .6; .31; .03; .02 G/100ML; G/100ML; G/100ML; G/100ML; G/100ML
1000 INJECTION, SOLUTION INTRAVENOUS ONCE
Status: CANCELLED
Start: 2025-07-16 | End: 2025-07-16

## 2025-07-13 RX ORDER — ONDANSETRON 2 MG/ML
4 INJECTION INTRAMUSCULAR; INTRAVENOUS ONCE
Status: CANCELLED
Start: 2025-07-16 | End: 2025-07-16

## 2025-07-13 RX ORDER — ONDANSETRON 2 MG/ML
4 INJECTION INTRAMUSCULAR; INTRAVENOUS ONCE
Status: COMPLETED | OUTPATIENT
Start: 2025-07-13 | End: 2025-07-13

## 2025-07-13 RX ORDER — DEXTROSE, SODIUM CHLORIDE, SODIUM LACTATE, POTASSIUM CHLORIDE, AND CALCIUM CHLORIDE 5; .6; .31; .03; .02 G/100ML; G/100ML; G/100ML; G/100ML; G/100ML
1000 INJECTION, SOLUTION INTRAVENOUS ONCE
Status: COMPLETED | OUTPATIENT
Start: 2025-07-13 | End: 2025-07-13

## 2025-07-13 RX ADMIN — ONDANSETRON 4 MG: 2 INJECTION INTRAMUSCULAR; INTRAVENOUS at 16:45

## 2025-07-13 RX ADMIN — SODIUM CHLORIDE, SODIUM LACTATE, POTASSIUM CHLORIDE, CALCIUM CHLORIDE AND DEXTROSE MONOHYDRATE 1000 ML: 5; 600; 310; 30; 20 INJECTION, SOLUTION INTRAVENOUS at 16:39

## 2025-07-13 ASSESSMENT — FIBROSIS 4 INDEX: FIB4 SCORE: .3381371087926511247

## 2025-07-13 NOTE — PROGRESS NOTES
Shantel presented to [facility] for IV hydration and Zofran for hyperemesis gravidarium. Pt denied having any new or acute complaints today. Pt did state that she continues to have nausea and vomiting. PIV started to RAC, had positive blood return and flushed briskly. Pt given 1L D5LR as prescribed, tolerated well, denied having any complaints during or after infusion. Zofran given with good relief of nausea. PIV discontinued, bleeding controlled with gauze and Coban. Pt has future appointments. Pt discharged to home in good condition.

## 2025-07-15 ENCOUNTER — TELEPHONE (OUTPATIENT)
Dept: HEALTH INFORMATION MANAGEMENT | Facility: OTHER | Age: 35
End: 2025-07-15
Payer: COMMERCIAL

## 2025-07-16 ENCOUNTER — OUTPATIENT INFUSION SERVICES (OUTPATIENT)
Dept: ONCOLOGY | Facility: MEDICAL CENTER | Age: 35
End: 2025-07-16
Attending: OBSTETRICS & GYNECOLOGY
Payer: COMMERCIAL

## 2025-07-16 VITALS
SYSTOLIC BLOOD PRESSURE: 118 MMHG | BODY MASS INDEX: 38.82 KG/M2 | DIASTOLIC BLOOD PRESSURE: 77 MMHG | RESPIRATION RATE: 18 BRPM | WEIGHT: 247.36 LBS | HEIGHT: 67 IN | TEMPERATURE: 97.9 F | HEART RATE: 97 BPM | OXYGEN SATURATION: 97 %

## 2025-07-16 DIAGNOSIS — O21.0 HYPEREMESIS GRAVIDARUM: Primary | ICD-10-CM

## 2025-07-16 PROCEDURE — 700105 HCHG RX REV CODE 258: Performed by: OBSTETRICS & GYNECOLOGY

## 2025-07-16 PROCEDURE — 96374 THER/PROPH/DIAG INJ IV PUSH: CPT

## 2025-07-16 PROCEDURE — 700111 HCHG RX REV CODE 636 W/ 250 OVERRIDE (IP): Mod: JZ | Performed by: OBSTETRICS & GYNECOLOGY

## 2025-07-16 PROCEDURE — 96361 HYDRATE IV INFUSION ADD-ON: CPT

## 2025-07-16 RX ORDER — DEXTROSE, SODIUM CHLORIDE, SODIUM LACTATE, POTASSIUM CHLORIDE, AND CALCIUM CHLORIDE 5; .6; .31; .03; .02 G/100ML; G/100ML; G/100ML; G/100ML; G/100ML
1000 INJECTION, SOLUTION INTRAVENOUS ONCE
Status: CANCELLED
Start: 2025-07-20 | End: 2025-07-20

## 2025-07-16 RX ORDER — ONDANSETRON 2 MG/ML
4 INJECTION INTRAMUSCULAR; INTRAVENOUS ONCE
Status: COMPLETED | OUTPATIENT
Start: 2025-07-16 | End: 2025-07-16

## 2025-07-16 RX ORDER — ONDANSETRON 2 MG/ML
4 INJECTION INTRAMUSCULAR; INTRAVENOUS ONCE
Status: CANCELLED
Start: 2025-07-20 | End: 2025-07-20

## 2025-07-16 RX ORDER — DEXTROSE, SODIUM CHLORIDE, SODIUM LACTATE, POTASSIUM CHLORIDE, AND CALCIUM CHLORIDE 5; .6; .31; .03; .02 G/100ML; G/100ML; G/100ML; G/100ML; G/100ML
1000 INJECTION, SOLUTION INTRAVENOUS ONCE
Status: COMPLETED | OUTPATIENT
Start: 2025-07-16 | End: 2025-07-16

## 2025-07-16 RX ADMIN — SODIUM CHLORIDE, SODIUM LACTATE, POTASSIUM CHLORIDE, CALCIUM CHLORIDE AND DEXTROSE MONOHYDRATE 1000 ML: 5; 600; 310; 30; 20 INJECTION, SOLUTION INTRAVENOUS at 16:47

## 2025-07-16 RX ADMIN — ONDANSETRON 4 MG: 2 INJECTION INTRAMUSCULAR; INTRAVENOUS at 16:47

## 2025-07-16 ASSESSMENT — FIBROSIS 4 INDEX: FIB4 SCORE: .3381371087926511247

## 2025-07-17 NOTE — PROGRESS NOTES
Shantel into IS for IV hydration and anti-emetic med for hyperemesis gravidarium.  PIV to left AC with + blood return.   Ondansetron administered per MAR and hydration infused over 1 hour.  PIV with + blood return post infusion, flushed and d/c'd with tip intact.  Verified next appointment and Shantel off unit in NAD.

## 2025-07-20 ENCOUNTER — OUTPATIENT INFUSION SERVICES (OUTPATIENT)
Dept: ONCOLOGY | Facility: MEDICAL CENTER | Age: 35
End: 2025-07-20
Attending: OBSTETRICS & GYNECOLOGY
Payer: COMMERCIAL

## 2025-07-20 VITALS
TEMPERATURE: 97.9 F | BODY MASS INDEX: 38.69 KG/M2 | SYSTOLIC BLOOD PRESSURE: 119 MMHG | RESPIRATION RATE: 16 BRPM | HEART RATE: 95 BPM | WEIGHT: 246.47 LBS | OXYGEN SATURATION: 96 % | DIASTOLIC BLOOD PRESSURE: 69 MMHG | HEIGHT: 67 IN

## 2025-07-20 DIAGNOSIS — O21.0 HYPEREMESIS GRAVIDARUM: Primary | ICD-10-CM

## 2025-07-20 PROCEDURE — 96361 HYDRATE IV INFUSION ADD-ON: CPT

## 2025-07-20 PROCEDURE — 700105 HCHG RX REV CODE 258: Performed by: OBSTETRICS & GYNECOLOGY

## 2025-07-20 PROCEDURE — 96374 THER/PROPH/DIAG INJ IV PUSH: CPT

## 2025-07-20 PROCEDURE — 700111 HCHG RX REV CODE 636 W/ 250 OVERRIDE (IP): Mod: JZ | Performed by: OBSTETRICS & GYNECOLOGY

## 2025-07-20 RX ORDER — ONDANSETRON 2 MG/ML
4 INJECTION INTRAMUSCULAR; INTRAVENOUS ONCE
Status: COMPLETED | OUTPATIENT
Start: 2025-07-20 | End: 2025-07-20

## 2025-07-20 RX ORDER — ONDANSETRON 2 MG/ML
4 INJECTION INTRAMUSCULAR; INTRAVENOUS ONCE
Status: CANCELLED
Start: 2025-07-23 | End: 2025-07-23

## 2025-07-20 RX ORDER — DEXTROSE, SODIUM CHLORIDE, SODIUM LACTATE, POTASSIUM CHLORIDE, AND CALCIUM CHLORIDE 5; .6; .31; .03; .02 G/100ML; G/100ML; G/100ML; G/100ML; G/100ML
1000 INJECTION, SOLUTION INTRAVENOUS ONCE
Status: COMPLETED | OUTPATIENT
Start: 2025-07-20 | End: 2025-07-20

## 2025-07-20 RX ORDER — DEXTROSE, SODIUM CHLORIDE, SODIUM LACTATE, POTASSIUM CHLORIDE, AND CALCIUM CHLORIDE 5; .6; .31; .03; .02 G/100ML; G/100ML; G/100ML; G/100ML; G/100ML
1000 INJECTION, SOLUTION INTRAVENOUS ONCE
Status: CANCELLED
Start: 2025-07-23 | End: 2025-07-23

## 2025-07-20 RX ADMIN — ONDANSETRON 4 MG: 2 INJECTION INTRAMUSCULAR; INTRAVENOUS at 13:47

## 2025-07-20 RX ADMIN — SODIUM CHLORIDE, SODIUM LACTATE, POTASSIUM CHLORIDE, CALCIUM CHLORIDE AND DEXTROSE MONOHYDRATE 1000 ML: 5; 600; 310; 30; 20 INJECTION, SOLUTION INTRAVENOUS at 13:47

## 2025-07-20 ASSESSMENT — FIBROSIS 4 INDEX: FIB4 SCORE: .3381371087926511247

## 2025-07-21 NOTE — PROGRESS NOTES
Patient to [facility] for hydration. PIV inserted into right side of AC, flushed with + blood return. Zofran given. D5w lactated ringers infused over an hour. PIV flushed with + blood return and removed. Gauze and coban applied as a dressing. Patient to home.

## 2025-07-23 ENCOUNTER — OUTPATIENT INFUSION SERVICES (OUTPATIENT)
Dept: ONCOLOGY | Facility: MEDICAL CENTER | Age: 35
End: 2025-07-23
Attending: OBSTETRICS & GYNECOLOGY
Payer: COMMERCIAL

## 2025-07-23 VITALS
SYSTOLIC BLOOD PRESSURE: 127 MMHG | RESPIRATION RATE: 16 BRPM | HEART RATE: 92 BPM | OXYGEN SATURATION: 97 % | DIASTOLIC BLOOD PRESSURE: 72 MMHG | BODY MASS INDEX: 39.24 KG/M2 | HEIGHT: 67 IN | WEIGHT: 250 LBS | TEMPERATURE: 97.4 F

## 2025-07-23 DIAGNOSIS — O21.0 HYPEREMESIS GRAVIDARUM: Primary | ICD-10-CM

## 2025-07-23 PROCEDURE — 96361 HYDRATE IV INFUSION ADD-ON: CPT

## 2025-07-23 PROCEDURE — 700111 HCHG RX REV CODE 636 W/ 250 OVERRIDE (IP): Mod: JZ | Performed by: OBSTETRICS & GYNECOLOGY

## 2025-07-23 PROCEDURE — 96374 THER/PROPH/DIAG INJ IV PUSH: CPT

## 2025-07-23 PROCEDURE — 700105 HCHG RX REV CODE 258: Performed by: OBSTETRICS & GYNECOLOGY

## 2025-07-23 RX ORDER — DEXTROSE, SODIUM CHLORIDE, SODIUM LACTATE, POTASSIUM CHLORIDE, AND CALCIUM CHLORIDE 5; .6; .31; .03; .02 G/100ML; G/100ML; G/100ML; G/100ML; G/100ML
1000 INJECTION, SOLUTION INTRAVENOUS ONCE
Status: COMPLETED | OUTPATIENT
Start: 2025-07-23 | End: 2025-07-23

## 2025-07-23 RX ORDER — DEXTROSE, SODIUM CHLORIDE, SODIUM LACTATE, POTASSIUM CHLORIDE, AND CALCIUM CHLORIDE 5; .6; .31; .03; .02 G/100ML; G/100ML; G/100ML; G/100ML; G/100ML
1000 INJECTION, SOLUTION INTRAVENOUS ONCE
Status: CANCELLED
Start: 2025-07-27 | End: 2025-07-27

## 2025-07-23 RX ORDER — ONDANSETRON 2 MG/ML
4 INJECTION INTRAMUSCULAR; INTRAVENOUS ONCE
Status: CANCELLED
Start: 2025-07-27 | End: 2025-07-27

## 2025-07-23 RX ORDER — ONDANSETRON 2 MG/ML
4 INJECTION INTRAMUSCULAR; INTRAVENOUS ONCE
Status: COMPLETED | OUTPATIENT
Start: 2025-07-23 | End: 2025-07-23

## 2025-07-23 RX ADMIN — SODIUM CHLORIDE, SODIUM LACTATE, POTASSIUM CHLORIDE, CALCIUM CHLORIDE AND DEXTROSE MONOHYDRATE 1000 ML: 5; 600; 310; 30; 20 INJECTION, SOLUTION INTRAVENOUS at 16:51

## 2025-07-23 RX ADMIN — ONDANSETRON 4 MG: 2 INJECTION INTRAMUSCULAR; INTRAVENOUS at 16:51

## 2025-07-23 ASSESSMENT — FIBROSIS 4 INDEX: FIB4 SCORE: .3381371087926511247

## 2025-07-27 ENCOUNTER — OUTPATIENT INFUSION SERVICES (OUTPATIENT)
Dept: ONCOLOGY | Facility: MEDICAL CENTER | Age: 35
End: 2025-07-27
Attending: OBSTETRICS & GYNECOLOGY
Payer: COMMERCIAL

## 2025-07-27 VITALS
RESPIRATION RATE: 20 BRPM | BODY MASS INDEX: 38.72 KG/M2 | HEART RATE: 102 BPM | SYSTOLIC BLOOD PRESSURE: 124 MMHG | WEIGHT: 246.69 LBS | OXYGEN SATURATION: 96 % | TEMPERATURE: 98.4 F | DIASTOLIC BLOOD PRESSURE: 73 MMHG | HEIGHT: 67 IN

## 2025-07-27 DIAGNOSIS — O21.0 HYPEREMESIS GRAVIDARUM: Primary | ICD-10-CM

## 2025-07-27 PROCEDURE — 700105 HCHG RX REV CODE 258: Performed by: OBSTETRICS & GYNECOLOGY

## 2025-07-27 PROCEDURE — 96374 THER/PROPH/DIAG INJ IV PUSH: CPT

## 2025-07-27 PROCEDURE — 700111 HCHG RX REV CODE 636 W/ 250 OVERRIDE (IP): Mod: JZ | Performed by: OBSTETRICS & GYNECOLOGY

## 2025-07-27 PROCEDURE — 96361 HYDRATE IV INFUSION ADD-ON: CPT

## 2025-07-27 RX ORDER — ONDANSETRON 2 MG/ML
4 INJECTION INTRAMUSCULAR; INTRAVENOUS ONCE
Status: CANCELLED
Start: 2025-07-30 | End: 2025-07-30

## 2025-07-27 RX ORDER — DEXTROSE, SODIUM CHLORIDE, SODIUM LACTATE, POTASSIUM CHLORIDE, AND CALCIUM CHLORIDE 5; .6; .31; .03; .02 G/100ML; G/100ML; G/100ML; G/100ML; G/100ML
1000 INJECTION, SOLUTION INTRAVENOUS ONCE
Status: COMPLETED | OUTPATIENT
Start: 2025-07-27 | End: 2025-07-27

## 2025-07-27 RX ORDER — ONDANSETRON 2 MG/ML
4 INJECTION INTRAMUSCULAR; INTRAVENOUS ONCE
Status: COMPLETED | OUTPATIENT
Start: 2025-07-27 | End: 2025-07-27

## 2025-07-27 RX ORDER — DEXTROSE, SODIUM CHLORIDE, SODIUM LACTATE, POTASSIUM CHLORIDE, AND CALCIUM CHLORIDE 5; .6; .31; .03; .02 G/100ML; G/100ML; G/100ML; G/100ML; G/100ML
1000 INJECTION, SOLUTION INTRAVENOUS ONCE
Status: CANCELLED
Start: 2025-07-30 | End: 2025-07-30

## 2025-07-27 RX ADMIN — SODIUM CHLORIDE, SODIUM LACTATE, POTASSIUM CHLORIDE, CALCIUM CHLORIDE AND DEXTROSE MONOHYDRATE 1000 ML: 5; 600; 310; 30; 20 INJECTION, SOLUTION INTRAVENOUS at 13:45

## 2025-07-27 RX ADMIN — ONDANSETRON 4 MG: 2 INJECTION INTRAMUSCULAR; INTRAVENOUS at 13:41

## 2025-07-27 ASSESSMENT — FIBROSIS 4 INDEX: FIB4 SCORE: .3381371087926511247

## 2025-07-27 NOTE — PROGRESS NOTES
Patient arrived to [facility] for hydration, no concerns at this time. PIV started to RAC, pre-meds given,  D5LR bolus infused per MAR well tolerated. PIV d/c'd, gauze and coban applied. Next appointment confirmed, patient left home in stable condition.

## 2025-07-30 ENCOUNTER — OUTPATIENT INFUSION SERVICES (OUTPATIENT)
Dept: ONCOLOGY | Facility: MEDICAL CENTER | Age: 35
End: 2025-07-30
Attending: OBSTETRICS & GYNECOLOGY
Payer: COMMERCIAL

## 2025-07-30 VITALS
DIASTOLIC BLOOD PRESSURE: 80 MMHG | HEART RATE: 104 BPM | OXYGEN SATURATION: 97 % | BODY MASS INDEX: 39.65 KG/M2 | TEMPERATURE: 98.2 F | HEIGHT: 67 IN | RESPIRATION RATE: 20 BRPM | SYSTOLIC BLOOD PRESSURE: 125 MMHG | WEIGHT: 252.65 LBS

## 2025-07-30 DIAGNOSIS — O21.0 HYPEREMESIS GRAVIDARUM: Primary | ICD-10-CM

## 2025-07-30 PROCEDURE — 96361 HYDRATE IV INFUSION ADD-ON: CPT

## 2025-07-30 PROCEDURE — 96374 THER/PROPH/DIAG INJ IV PUSH: CPT

## 2025-07-30 PROCEDURE — 700111 HCHG RX REV CODE 636 W/ 250 OVERRIDE (IP): Mod: JZ | Performed by: OBSTETRICS & GYNECOLOGY

## 2025-07-30 PROCEDURE — 700105 HCHG RX REV CODE 258: Performed by: OBSTETRICS & GYNECOLOGY

## 2025-07-30 RX ORDER — ONDANSETRON 2 MG/ML
4 INJECTION INTRAMUSCULAR; INTRAVENOUS ONCE
Status: COMPLETED | OUTPATIENT
Start: 2025-07-30 | End: 2025-07-30

## 2025-07-30 RX ORDER — DEXTROSE, SODIUM CHLORIDE, SODIUM LACTATE, POTASSIUM CHLORIDE, AND CALCIUM CHLORIDE 5; .6; .31; .03; .02 G/100ML; G/100ML; G/100ML; G/100ML; G/100ML
1000 INJECTION, SOLUTION INTRAVENOUS ONCE
Start: 2025-08-03 | End: 2025-08-03

## 2025-07-30 RX ORDER — ONDANSETRON 2 MG/ML
4 INJECTION INTRAMUSCULAR; INTRAVENOUS ONCE
Start: 2025-08-03 | End: 2025-08-03

## 2025-07-30 RX ORDER — DEXTROSE, SODIUM CHLORIDE, SODIUM LACTATE, POTASSIUM CHLORIDE, AND CALCIUM CHLORIDE 5; .6; .31; .03; .02 G/100ML; G/100ML; G/100ML; G/100ML; G/100ML
1000 INJECTION, SOLUTION INTRAVENOUS ONCE
Status: COMPLETED | OUTPATIENT
Start: 2025-07-30 | End: 2025-07-30

## 2025-07-30 RX ADMIN — SODIUM CHLORIDE, SODIUM LACTATE, POTASSIUM CHLORIDE, CALCIUM CHLORIDE AND DEXTROSE MONOHYDRATE 1000 ML: 5; 600; 310; 30; 20 INJECTION, SOLUTION INTRAVENOUS at 16:30

## 2025-07-30 RX ADMIN — ONDANSETRON 4 MG: 2 INJECTION INTRAMUSCULAR; INTRAVENOUS at 16:31

## 2025-07-30 ASSESSMENT — FIBROSIS 4 INDEX: FIB4 SCORE: .3381371087926511247

## 2025-07-30 NOTE — PROGRESS NOTES
Pt arrived ambulatory to infusion for hydration/anti-emetics. Pt with ongoing nausea, denies vomiting today. 22g PIV established in the L-AC, positive blood return noted. Zofran administered with no complications. 1L D5LR infused with no complications. PIV flushed and removed. Pt left infusion with no s/sx of distress. Follow up appointment confirmed.

## 2025-08-03 ENCOUNTER — OUTPATIENT INFUSION SERVICES (OUTPATIENT)
Dept: ONCOLOGY | Facility: MEDICAL CENTER | Age: 35
End: 2025-08-03
Attending: OBSTETRICS & GYNECOLOGY
Payer: COMMERCIAL

## 2025-08-03 VITALS
BODY MASS INDEX: 39.13 KG/M2 | OXYGEN SATURATION: 97 % | TEMPERATURE: 98.2 F | HEIGHT: 67 IN | HEART RATE: 120 BPM | DIASTOLIC BLOOD PRESSURE: 75 MMHG | WEIGHT: 249.34 LBS | RESPIRATION RATE: 20 BRPM | SYSTOLIC BLOOD PRESSURE: 130 MMHG

## 2025-08-03 DIAGNOSIS — O21.0 HYPEREMESIS GRAVIDARUM: Primary | ICD-10-CM

## 2025-08-03 PROCEDURE — 700111 HCHG RX REV CODE 636 W/ 250 OVERRIDE (IP): Mod: JZ | Performed by: OBSTETRICS & GYNECOLOGY

## 2025-08-03 PROCEDURE — 96361 HYDRATE IV INFUSION ADD-ON: CPT

## 2025-08-03 PROCEDURE — 700105 HCHG RX REV CODE 258: Performed by: OBSTETRICS & GYNECOLOGY

## 2025-08-03 PROCEDURE — 96374 THER/PROPH/DIAG INJ IV PUSH: CPT

## 2025-08-03 RX ORDER — DEXTROSE, SODIUM CHLORIDE, SODIUM LACTATE, POTASSIUM CHLORIDE, AND CALCIUM CHLORIDE 5; .6; .31; .03; .02 G/100ML; G/100ML; G/100ML; G/100ML; G/100ML
1000 INJECTION, SOLUTION INTRAVENOUS ONCE
Status: CANCELLED
Start: 2025-08-06 | End: 2025-08-06

## 2025-08-03 RX ORDER — ONDANSETRON 2 MG/ML
4 INJECTION INTRAMUSCULAR; INTRAVENOUS ONCE
Status: CANCELLED
Start: 2025-08-06 | End: 2025-08-06

## 2025-08-03 RX ORDER — ONDANSETRON 2 MG/ML
4 INJECTION INTRAMUSCULAR; INTRAVENOUS ONCE
Status: COMPLETED | OUTPATIENT
Start: 2025-08-03 | End: 2025-08-03

## 2025-08-03 RX ORDER — DEXTROSE, SODIUM CHLORIDE, SODIUM LACTATE, POTASSIUM CHLORIDE, AND CALCIUM CHLORIDE 5; .6; .31; .03; .02 G/100ML; G/100ML; G/100ML; G/100ML; G/100ML
1000 INJECTION, SOLUTION INTRAVENOUS ONCE
Status: COMPLETED | OUTPATIENT
Start: 2025-08-03 | End: 2025-08-03

## 2025-08-03 RX ADMIN — SODIUM CHLORIDE, SODIUM LACTATE, POTASSIUM CHLORIDE, CALCIUM CHLORIDE AND DEXTROSE MONOHYDRATE 1000 ML: 5; 600; 310; 30; 20 INJECTION, SOLUTION INTRAVENOUS at 14:23

## 2025-08-03 RX ADMIN — ONDANSETRON 4 MG: 2 INJECTION INTRAMUSCULAR; INTRAVENOUS at 14:25

## 2025-08-03 ASSESSMENT — FIBROSIS 4 INDEX: FIB4 SCORE: .3381371087926511247

## 2025-08-06 ENCOUNTER — OUTPATIENT INFUSION SERVICES (OUTPATIENT)
Dept: ONCOLOGY | Facility: MEDICAL CENTER | Age: 35
End: 2025-08-06
Attending: OBSTETRICS & GYNECOLOGY
Payer: COMMERCIAL

## 2025-08-06 ENCOUNTER — HOSPITAL ENCOUNTER (OUTPATIENT)
Facility: MEDICAL CENTER | Age: 35
End: 2025-08-06
Attending: OBSTETRICS & GYNECOLOGY
Payer: COMMERCIAL

## 2025-08-06 VITALS
BODY MASS INDEX: 39.48 KG/M2 | RESPIRATION RATE: 18 BRPM | OXYGEN SATURATION: 95 % | DIASTOLIC BLOOD PRESSURE: 73 MMHG | HEART RATE: 93 BPM | HEIGHT: 67 IN | TEMPERATURE: 97.9 F | SYSTOLIC BLOOD PRESSURE: 126 MMHG | WEIGHT: 251.54 LBS

## 2025-08-06 DIAGNOSIS — O21.0 HYPEREMESIS GRAVIDARUM: Primary | ICD-10-CM

## 2025-08-06 LAB
BASOPHILS # BLD AUTO: 0.2 % (ref 0–1.8)
BASOPHILS # BLD: 0.03 K/UL (ref 0–0.12)
EOSINOPHIL # BLD AUTO: 0.05 K/UL (ref 0–0.51)
EOSINOPHIL NFR BLD: 0.4 % (ref 0–6.9)
ERYTHROCYTE [DISTWIDTH] IN BLOOD BY AUTOMATED COUNT: 45.6 FL (ref 35.9–50)
GLUCOSE 1H P 50 G GLC PO SERPL-MCNC: 111 MG/DL (ref 70–139)
HCT VFR BLD AUTO: 39.2 % (ref 37–47)
HGB BLD-MCNC: 12.7 G/DL (ref 12–16)
IMM GRANULOCYTES # BLD AUTO: 0.33 K/UL (ref 0–0.11)
IMM GRANULOCYTES NFR BLD AUTO: 2.4 % (ref 0–0.9)
LYMPHOCYTES # BLD AUTO: 1.45 K/UL (ref 1–4.8)
LYMPHOCYTES NFR BLD: 10.7 % (ref 22–41)
MCH RBC QN AUTO: 29.9 PG (ref 27–33)
MCHC RBC AUTO-ENTMCNC: 32.4 G/DL (ref 32.2–35.5)
MCV RBC AUTO: 92.2 FL (ref 81.4–97.8)
MONOCYTES # BLD AUTO: 0.79 K/UL (ref 0–0.85)
MONOCYTES NFR BLD AUTO: 5.8 % (ref 0–13.4)
NEUTROPHILS # BLD AUTO: 10.92 K/UL (ref 1.82–7.42)
NEUTROPHILS NFR BLD: 80.5 % (ref 44–72)
NRBC # BLD AUTO: 0 K/UL
NRBC BLD-RTO: 0 /100 WBC (ref 0–0.2)
PLATELET # BLD AUTO: 431 K/UL (ref 164–446)
PMV BLD AUTO: 9 FL (ref 9–12.9)
RBC # BLD AUTO: 4.25 M/UL (ref 4.2–5.4)
WBC # BLD AUTO: 13.6 K/UL (ref 4.8–10.8)

## 2025-08-06 PROCEDURE — 700105 HCHG RX REV CODE 258: Performed by: OBSTETRICS & GYNECOLOGY

## 2025-08-06 PROCEDURE — 85025 COMPLETE CBC W/AUTO DIFF WBC: CPT

## 2025-08-06 PROCEDURE — 700111 HCHG RX REV CODE 636 W/ 250 OVERRIDE (IP): Mod: JZ | Performed by: OBSTETRICS & GYNECOLOGY

## 2025-08-06 PROCEDURE — 82950 GLUCOSE TEST: CPT

## 2025-08-06 PROCEDURE — 96374 THER/PROPH/DIAG INJ IV PUSH: CPT

## 2025-08-06 PROCEDURE — 86592 SYPHILIS TEST NON-TREP QUAL: CPT

## 2025-08-06 PROCEDURE — 96361 HYDRATE IV INFUSION ADD-ON: CPT

## 2025-08-06 PROCEDURE — 86780 TREPONEMA PALLIDUM: CPT

## 2025-08-06 RX ORDER — DEXTROSE, SODIUM CHLORIDE, SODIUM LACTATE, POTASSIUM CHLORIDE, AND CALCIUM CHLORIDE 5; .6; .31; .03; .02 G/100ML; G/100ML; G/100ML; G/100ML; G/100ML
1000 INJECTION, SOLUTION INTRAVENOUS ONCE
Status: CANCELLED
Start: 2025-08-10 | End: 2025-08-10

## 2025-08-06 RX ORDER — ONDANSETRON 2 MG/ML
4 INJECTION INTRAMUSCULAR; INTRAVENOUS ONCE
Status: CANCELLED
Start: 2025-08-10 | End: 2025-08-10

## 2025-08-06 RX ORDER — ONDANSETRON 2 MG/ML
4 INJECTION INTRAMUSCULAR; INTRAVENOUS ONCE
Status: COMPLETED | OUTPATIENT
Start: 2025-08-06 | End: 2025-08-06

## 2025-08-06 RX ORDER — DEXTROSE, SODIUM CHLORIDE, SODIUM LACTATE, POTASSIUM CHLORIDE, AND CALCIUM CHLORIDE 5; .6; .31; .03; .02 G/100ML; G/100ML; G/100ML; G/100ML; G/100ML
1000 INJECTION, SOLUTION INTRAVENOUS ONCE
Status: COMPLETED | OUTPATIENT
Start: 2025-08-06 | End: 2025-08-06

## 2025-08-06 RX ADMIN — SODIUM CHLORIDE, SODIUM LACTATE, POTASSIUM CHLORIDE, CALCIUM CHLORIDE AND DEXTROSE MONOHYDRATE 1000 ML: 5; 600; 310; 30; 20 INJECTION, SOLUTION INTRAVENOUS at 16:54

## 2025-08-06 RX ADMIN — ONDANSETRON 4 MG: 2 INJECTION INTRAMUSCULAR; INTRAVENOUS at 16:56

## 2025-08-06 ASSESSMENT — FIBROSIS 4 INDEX: FIB4 SCORE: .3381371087926511247

## 2025-08-08 LAB — RPR SER QL: NON REACTIVE

## 2025-08-09 LAB — T PALLIDUM AB SER QL AGGL: NON REACTIVE

## 2025-08-11 ENCOUNTER — OUTPATIENT INFUSION SERVICES (OUTPATIENT)
Dept: ONCOLOGY | Facility: MEDICAL CENTER | Age: 35
End: 2025-08-11
Attending: OBSTETRICS & GYNECOLOGY
Payer: COMMERCIAL

## 2025-08-11 VITALS
SYSTOLIC BLOOD PRESSURE: 123 MMHG | HEART RATE: 92 BPM | DIASTOLIC BLOOD PRESSURE: 70 MMHG | HEIGHT: 67 IN | TEMPERATURE: 98.1 F | BODY MASS INDEX: 40.35 KG/M2 | RESPIRATION RATE: 18 BRPM | WEIGHT: 257.06 LBS | OXYGEN SATURATION: 96 %

## 2025-08-11 DIAGNOSIS — O21.0 HYPEREMESIS GRAVIDARUM: Primary | ICD-10-CM

## 2025-08-11 PROCEDURE — 96361 HYDRATE IV INFUSION ADD-ON: CPT

## 2025-08-11 PROCEDURE — 96374 THER/PROPH/DIAG INJ IV PUSH: CPT

## 2025-08-11 PROCEDURE — 700111 HCHG RX REV CODE 636 W/ 250 OVERRIDE (IP): Mod: JZ | Performed by: OBSTETRICS & GYNECOLOGY

## 2025-08-11 PROCEDURE — 700105 HCHG RX REV CODE 258: Performed by: OBSTETRICS & GYNECOLOGY

## 2025-08-11 RX ORDER — DEXTROSE, SODIUM CHLORIDE, SODIUM LACTATE, POTASSIUM CHLORIDE, AND CALCIUM CHLORIDE 5; .6; .31; .03; .02 G/100ML; G/100ML; G/100ML; G/100ML; G/100ML
1000 INJECTION, SOLUTION INTRAVENOUS ONCE
Status: COMPLETED | OUTPATIENT
Start: 2025-08-11 | End: 2025-08-11

## 2025-08-11 RX ORDER — ONDANSETRON 2 MG/ML
4 INJECTION INTRAMUSCULAR; INTRAVENOUS ONCE
Status: CANCELLED
Start: 2025-08-13 | End: 2025-08-13

## 2025-08-11 RX ORDER — ONDANSETRON 2 MG/ML
4 INJECTION INTRAMUSCULAR; INTRAVENOUS ONCE
Status: COMPLETED | OUTPATIENT
Start: 2025-08-11 | End: 2025-08-11

## 2025-08-11 RX ORDER — DEXTROSE, SODIUM CHLORIDE, SODIUM LACTATE, POTASSIUM CHLORIDE, AND CALCIUM CHLORIDE 5; .6; .31; .03; .02 G/100ML; G/100ML; G/100ML; G/100ML; G/100ML
1000 INJECTION, SOLUTION INTRAVENOUS ONCE
Status: CANCELLED
Start: 2025-08-13 | End: 2025-08-13

## 2025-08-11 RX ADMIN — SODIUM CHLORIDE, SODIUM LACTATE, POTASSIUM CHLORIDE, CALCIUM CHLORIDE AND DEXTROSE MONOHYDRATE 1000 ML: 5; 600; 310; 30; 20 INJECTION, SOLUTION INTRAVENOUS at 16:38

## 2025-08-11 RX ADMIN — ONDANSETRON 4 MG: 2 INJECTION INTRAMUSCULAR; INTRAVENOUS at 16:41

## 2025-08-11 ASSESSMENT — FIBROSIS 4 INDEX: FIB4 SCORE: 0.35

## 2025-08-13 ENCOUNTER — OUTPATIENT INFUSION SERVICES (OUTPATIENT)
Dept: ONCOLOGY | Facility: MEDICAL CENTER | Age: 35
End: 2025-08-13
Attending: OBSTETRICS & GYNECOLOGY
Payer: COMMERCIAL

## 2025-08-13 VITALS
OXYGEN SATURATION: 98 % | SYSTOLIC BLOOD PRESSURE: 140 MMHG | BODY MASS INDEX: 40.48 KG/M2 | HEIGHT: 67 IN | WEIGHT: 257.94 LBS | TEMPERATURE: 98 F | RESPIRATION RATE: 18 BRPM | DIASTOLIC BLOOD PRESSURE: 83 MMHG | HEART RATE: 110 BPM

## 2025-08-13 DIAGNOSIS — O21.0 HYPEREMESIS GRAVIDARUM: Primary | ICD-10-CM

## 2025-08-13 PROCEDURE — 700105 HCHG RX REV CODE 258: Performed by: OBSTETRICS & GYNECOLOGY

## 2025-08-13 PROCEDURE — 96361 HYDRATE IV INFUSION ADD-ON: CPT

## 2025-08-13 PROCEDURE — 96374 THER/PROPH/DIAG INJ IV PUSH: CPT

## 2025-08-13 PROCEDURE — 700111 HCHG RX REV CODE 636 W/ 250 OVERRIDE (IP): Mod: JZ | Performed by: OBSTETRICS & GYNECOLOGY

## 2025-08-13 RX ORDER — ONDANSETRON 2 MG/ML
4 INJECTION INTRAMUSCULAR; INTRAVENOUS ONCE
Status: COMPLETED | OUTPATIENT
Start: 2025-08-13 | End: 2025-08-13

## 2025-08-13 RX ORDER — DEXTROSE, SODIUM CHLORIDE, SODIUM LACTATE, POTASSIUM CHLORIDE, AND CALCIUM CHLORIDE 5; .6; .31; .03; .02 G/100ML; G/100ML; G/100ML; G/100ML; G/100ML
1000 INJECTION, SOLUTION INTRAVENOUS ONCE
Status: COMPLETED | OUTPATIENT
Start: 2025-08-13 | End: 2025-08-13

## 2025-08-13 RX ORDER — ONDANSETRON 2 MG/ML
4 INJECTION INTRAMUSCULAR; INTRAVENOUS ONCE
Status: CANCELLED
Start: 2025-08-17 | End: 2025-08-17

## 2025-08-13 RX ORDER — DEXTROSE, SODIUM CHLORIDE, SODIUM LACTATE, POTASSIUM CHLORIDE, AND CALCIUM CHLORIDE 5; .6; .31; .03; .02 G/100ML; G/100ML; G/100ML; G/100ML; G/100ML
1000 INJECTION, SOLUTION INTRAVENOUS ONCE
Status: CANCELLED
Start: 2025-08-17 | End: 2025-08-17

## 2025-08-13 RX ADMIN — SODIUM CHLORIDE, SODIUM LACTATE, POTASSIUM CHLORIDE, CALCIUM CHLORIDE AND DEXTROSE MONOHYDRATE 1000 ML: 5; 600; 310; 30; 20 INJECTION, SOLUTION INTRAVENOUS at 16:41

## 2025-08-13 RX ADMIN — ONDANSETRON 4 MG: 2 INJECTION INTRAMUSCULAR; INTRAVENOUS at 16:45

## 2025-08-13 ASSESSMENT — FIBROSIS 4 INDEX: FIB4 SCORE: 0.35

## 2025-08-14 DIAGNOSIS — D68.51 HETEROZYGOUS FACTOR V LEIDEN MUTATION (HCC): ICD-10-CM

## 2025-08-14 PROCEDURE — RXMED WILLOW AMBULATORY MEDICATION CHARGE: Performed by: FAMILY MEDICINE

## 2025-08-14 RX ORDER — ENOXAPARIN SODIUM 100 MG/ML
40 INJECTION SUBCUTANEOUS DAILY
Qty: 30 EACH | Refills: 11 | Status: SHIPPED | OUTPATIENT
Start: 2025-08-14

## 2025-08-15 ENCOUNTER — PHARMACY VISIT (OUTPATIENT)
Dept: PHARMACY | Facility: MEDICAL CENTER | Age: 35
End: 2025-08-15
Payer: COMMERCIAL

## 2025-08-17 ENCOUNTER — OUTPATIENT INFUSION SERVICES (OUTPATIENT)
Dept: ONCOLOGY | Facility: MEDICAL CENTER | Age: 35
End: 2025-08-17
Attending: OBSTETRICS & GYNECOLOGY
Payer: COMMERCIAL

## 2025-08-17 VITALS
RESPIRATION RATE: 16 BRPM | OXYGEN SATURATION: 98 % | SYSTOLIC BLOOD PRESSURE: 126 MMHG | HEIGHT: 67 IN | TEMPERATURE: 98 F | BODY MASS INDEX: 40.21 KG/M2 | WEIGHT: 256.17 LBS | HEART RATE: 105 BPM | DIASTOLIC BLOOD PRESSURE: 80 MMHG

## 2025-08-17 DIAGNOSIS — O21.0 HYPEREMESIS GRAVIDARUM: Primary | ICD-10-CM

## 2025-08-17 PROCEDURE — 96374 THER/PROPH/DIAG INJ IV PUSH: CPT

## 2025-08-17 PROCEDURE — 700111 HCHG RX REV CODE 636 W/ 250 OVERRIDE (IP): Mod: JZ | Performed by: OBSTETRICS & GYNECOLOGY

## 2025-08-17 PROCEDURE — 700105 HCHG RX REV CODE 258: Performed by: OBSTETRICS & GYNECOLOGY

## 2025-08-17 PROCEDURE — 96361 HYDRATE IV INFUSION ADD-ON: CPT

## 2025-08-17 RX ORDER — ONDANSETRON 2 MG/ML
4 INJECTION INTRAMUSCULAR; INTRAVENOUS ONCE
Status: COMPLETED | OUTPATIENT
Start: 2025-08-17 | End: 2025-08-17

## 2025-08-17 RX ORDER — DEXTROSE, SODIUM CHLORIDE, SODIUM LACTATE, POTASSIUM CHLORIDE, AND CALCIUM CHLORIDE 5; .6; .31; .03; .02 G/100ML; G/100ML; G/100ML; G/100ML; G/100ML
1000 INJECTION, SOLUTION INTRAVENOUS ONCE
Status: CANCELLED
Start: 2025-08-20 | End: 2025-08-20

## 2025-08-17 RX ORDER — DEXTROSE, SODIUM CHLORIDE, SODIUM LACTATE, POTASSIUM CHLORIDE, AND CALCIUM CHLORIDE 5; .6; .31; .03; .02 G/100ML; G/100ML; G/100ML; G/100ML; G/100ML
1000 INJECTION, SOLUTION INTRAVENOUS ONCE
Status: COMPLETED | OUTPATIENT
Start: 2025-08-17 | End: 2025-08-17

## 2025-08-17 RX ORDER — ONDANSETRON 2 MG/ML
4 INJECTION INTRAMUSCULAR; INTRAVENOUS ONCE
Status: CANCELLED
Start: 2025-08-20 | End: 2025-08-20

## 2025-08-17 RX ADMIN — SODIUM CHLORIDE, SODIUM LACTATE, POTASSIUM CHLORIDE, CALCIUM CHLORIDE AND DEXTROSE MONOHYDRATE 1000 ML: 5; 600; 310; 30; 20 INJECTION, SOLUTION INTRAVENOUS at 13:48

## 2025-08-17 RX ADMIN — ONDANSETRON 4 MG: 2 INJECTION INTRAMUSCULAR; INTRAVENOUS at 13:48

## 2025-08-17 ASSESSMENT — FIBROSIS 4 INDEX: FIB4 SCORE: 0.35

## 2025-08-20 ENCOUNTER — OUTPATIENT INFUSION SERVICES (OUTPATIENT)
Dept: ONCOLOGY | Facility: MEDICAL CENTER | Age: 35
End: 2025-08-20
Attending: OBSTETRICS & GYNECOLOGY
Payer: COMMERCIAL

## 2025-08-20 VITALS
DIASTOLIC BLOOD PRESSURE: 73 MMHG | HEIGHT: 67 IN | HEART RATE: 96 BPM | TEMPERATURE: 98.1 F | RESPIRATION RATE: 18 BRPM | WEIGHT: 263.23 LBS | SYSTOLIC BLOOD PRESSURE: 131 MMHG | BODY MASS INDEX: 41.31 KG/M2 | OXYGEN SATURATION: 97 %

## 2025-08-20 DIAGNOSIS — O21.0 HYPEREMESIS GRAVIDARUM: Primary | ICD-10-CM

## 2025-08-20 PROCEDURE — 96374 THER/PROPH/DIAG INJ IV PUSH: CPT

## 2025-08-20 PROCEDURE — 700105 HCHG RX REV CODE 258: Performed by: OBSTETRICS & GYNECOLOGY

## 2025-08-20 PROCEDURE — 700111 HCHG RX REV CODE 636 W/ 250 OVERRIDE (IP): Mod: JZ | Performed by: OBSTETRICS & GYNECOLOGY

## 2025-08-20 PROCEDURE — 96361 HYDRATE IV INFUSION ADD-ON: CPT

## 2025-08-20 RX ORDER — DEXTROSE, SODIUM CHLORIDE, SODIUM LACTATE, POTASSIUM CHLORIDE, AND CALCIUM CHLORIDE 5; .6; .31; .03; .02 G/100ML; G/100ML; G/100ML; G/100ML; G/100ML
1000 INJECTION, SOLUTION INTRAVENOUS ONCE
Status: COMPLETED | OUTPATIENT
Start: 2025-08-20 | End: 2025-08-20

## 2025-08-20 RX ORDER — ONDANSETRON 2 MG/ML
4 INJECTION INTRAMUSCULAR; INTRAVENOUS ONCE
Status: CANCELLED
Start: 2025-08-24 | End: 2025-08-24

## 2025-08-20 RX ORDER — ONDANSETRON 2 MG/ML
4 INJECTION INTRAMUSCULAR; INTRAVENOUS ONCE
Status: COMPLETED | OUTPATIENT
Start: 2025-08-20 | End: 2025-08-20

## 2025-08-20 RX ORDER — DEXTROSE, SODIUM CHLORIDE, SODIUM LACTATE, POTASSIUM CHLORIDE, AND CALCIUM CHLORIDE 5; .6; .31; .03; .02 G/100ML; G/100ML; G/100ML; G/100ML; G/100ML
1000 INJECTION, SOLUTION INTRAVENOUS ONCE
Status: CANCELLED
Start: 2025-08-24 | End: 2025-08-24

## 2025-08-20 RX ADMIN — SODIUM CHLORIDE, SODIUM LACTATE, POTASSIUM CHLORIDE, CALCIUM CHLORIDE AND DEXTROSE MONOHYDRATE 1000 ML: 5; 600; 310; 30; 20 INJECTION, SOLUTION INTRAVENOUS at 16:53

## 2025-08-20 RX ADMIN — ONDANSETRON 4 MG: 2 INJECTION INTRAMUSCULAR; INTRAVENOUS at 16:46

## 2025-08-20 ASSESSMENT — FIBROSIS 4 INDEX: FIB4 SCORE: 0.35

## 2025-08-24 ENCOUNTER — OUTPATIENT INFUSION SERVICES (OUTPATIENT)
Dept: ONCOLOGY | Facility: MEDICAL CENTER | Age: 35
End: 2025-08-24
Attending: OBSTETRICS & GYNECOLOGY
Payer: COMMERCIAL

## 2025-08-24 VITALS
HEART RATE: 98 BPM | WEIGHT: 259.48 LBS | DIASTOLIC BLOOD PRESSURE: 66 MMHG | RESPIRATION RATE: 18 BRPM | SYSTOLIC BLOOD PRESSURE: 129 MMHG | OXYGEN SATURATION: 96 % | TEMPERATURE: 98.1 F | HEIGHT: 67 IN | BODY MASS INDEX: 40.73 KG/M2

## 2025-08-24 DIAGNOSIS — O21.0 HYPEREMESIS GRAVIDARUM: Primary | ICD-10-CM

## 2025-08-24 PROCEDURE — 96374 THER/PROPH/DIAG INJ IV PUSH: CPT

## 2025-08-24 PROCEDURE — 700105 HCHG RX REV CODE 258: Performed by: OBSTETRICS & GYNECOLOGY

## 2025-08-24 PROCEDURE — 96361 HYDRATE IV INFUSION ADD-ON: CPT

## 2025-08-24 PROCEDURE — 700111 HCHG RX REV CODE 636 W/ 250 OVERRIDE (IP): Mod: JZ | Performed by: OBSTETRICS & GYNECOLOGY

## 2025-08-24 RX ORDER — DEXTROSE, SODIUM CHLORIDE, SODIUM LACTATE, POTASSIUM CHLORIDE, AND CALCIUM CHLORIDE 5; .6; .31; .03; .02 G/100ML; G/100ML; G/100ML; G/100ML; G/100ML
1000 INJECTION, SOLUTION INTRAVENOUS ONCE
Status: CANCELLED
Start: 2025-08-27 | End: 2025-08-27

## 2025-08-24 RX ORDER — ONDANSETRON 2 MG/ML
4 INJECTION INTRAMUSCULAR; INTRAVENOUS ONCE
Status: CANCELLED
Start: 2025-08-27 | End: 2025-08-27

## 2025-08-24 RX ORDER — DEXTROSE, SODIUM CHLORIDE, SODIUM LACTATE, POTASSIUM CHLORIDE, AND CALCIUM CHLORIDE 5; .6; .31; .03; .02 G/100ML; G/100ML; G/100ML; G/100ML; G/100ML
1000 INJECTION, SOLUTION INTRAVENOUS ONCE
Status: COMPLETED | OUTPATIENT
Start: 2025-08-24 | End: 2025-08-24

## 2025-08-24 RX ORDER — ONDANSETRON 2 MG/ML
4 INJECTION INTRAMUSCULAR; INTRAVENOUS ONCE
Status: COMPLETED | OUTPATIENT
Start: 2025-08-24 | End: 2025-08-24

## 2025-08-24 RX ADMIN — ONDANSETRON 4 MG: 2 INJECTION INTRAMUSCULAR; INTRAVENOUS at 13:54

## 2025-08-24 RX ADMIN — SODIUM CHLORIDE, SODIUM LACTATE, POTASSIUM CHLORIDE, CALCIUM CHLORIDE AND DEXTROSE MONOHYDRATE 1000 ML: 5; 600; 310; 30; 20 INJECTION, SOLUTION INTRAVENOUS at 13:56

## 2025-08-24 ASSESSMENT — FIBROSIS 4 INDEX: FIB4 SCORE: 0.35

## 2025-08-28 ENCOUNTER — OUTPATIENT INFUSION SERVICES (OUTPATIENT)
Dept: ONCOLOGY | Facility: MEDICAL CENTER | Age: 35
End: 2025-08-28
Attending: OBSTETRICS & GYNECOLOGY
Payer: COMMERCIAL

## 2025-08-28 VITALS
SYSTOLIC BLOOD PRESSURE: 124 MMHG | RESPIRATION RATE: 18 BRPM | OXYGEN SATURATION: 95 % | TEMPERATURE: 98 F | WEIGHT: 259.7 LBS | HEIGHT: 67 IN | HEART RATE: 101 BPM | BODY MASS INDEX: 40.76 KG/M2 | DIASTOLIC BLOOD PRESSURE: 78 MMHG

## 2025-08-28 DIAGNOSIS — O21.0 HYPEREMESIS GRAVIDARUM: Primary | ICD-10-CM

## 2025-08-28 PROCEDURE — 700105 HCHG RX REV CODE 258: Performed by: OBSTETRICS & GYNECOLOGY

## 2025-08-28 PROCEDURE — 96361 HYDRATE IV INFUSION ADD-ON: CPT

## 2025-08-28 PROCEDURE — 96374 THER/PROPH/DIAG INJ IV PUSH: CPT

## 2025-08-28 PROCEDURE — 700111 HCHG RX REV CODE 636 W/ 250 OVERRIDE (IP): Mod: JZ | Performed by: OBSTETRICS & GYNECOLOGY

## 2025-08-28 RX ORDER — ONDANSETRON 2 MG/ML
4 INJECTION INTRAMUSCULAR; INTRAVENOUS ONCE
Status: COMPLETED | OUTPATIENT
Start: 2025-08-28 | End: 2025-08-28

## 2025-08-28 RX ORDER — DEXTROSE, SODIUM CHLORIDE, SODIUM LACTATE, POTASSIUM CHLORIDE, AND CALCIUM CHLORIDE 5; .6; .31; .03; .02 G/100ML; G/100ML; G/100ML; G/100ML; G/100ML
1000 INJECTION, SOLUTION INTRAVENOUS ONCE
Status: CANCELLED
Start: 2025-08-28 | End: 2025-08-28

## 2025-08-28 RX ORDER — ONDANSETRON 2 MG/ML
4 INJECTION INTRAMUSCULAR; INTRAVENOUS ONCE
Status: CANCELLED
Start: 2025-08-28 | End: 2025-08-28

## 2025-08-28 RX ORDER — DEXTROSE, SODIUM CHLORIDE, SODIUM LACTATE, POTASSIUM CHLORIDE, AND CALCIUM CHLORIDE 5; .6; .31; .03; .02 G/100ML; G/100ML; G/100ML; G/100ML; G/100ML
1000 INJECTION, SOLUTION INTRAVENOUS ONCE
Status: COMPLETED | OUTPATIENT
Start: 2025-08-28 | End: 2025-08-28

## 2025-08-28 RX ADMIN — SODIUM CHLORIDE, SODIUM LACTATE, POTASSIUM CHLORIDE, CALCIUM CHLORIDE AND DEXTROSE MONOHYDRATE 1000 ML: 5; 600; 310; 30; 20 INJECTION, SOLUTION INTRAVENOUS at 15:24

## 2025-08-28 RX ADMIN — ONDANSETRON 4 MG: 2 INJECTION INTRAMUSCULAR; INTRAVENOUS at 15:24

## 2025-08-28 ASSESSMENT — FIBROSIS 4 INDEX: FIB4 SCORE: 0.35
